# Patient Record
Sex: FEMALE | Race: WHITE | Employment: OTHER | URBAN - METROPOLITAN AREA
[De-identification: names, ages, dates, MRNs, and addresses within clinical notes are randomized per-mention and may not be internally consistent; named-entity substitution may affect disease eponyms.]

---

## 2019-04-27 ENCOUNTER — APPOINTMENT (OUTPATIENT)
Dept: GENERAL RADIOLOGY | Facility: CLINIC | Age: 84
DRG: 480 | End: 2019-04-27
Attending: EMERGENCY MEDICINE
Payer: MEDICARE

## 2019-04-27 ENCOUNTER — HOSPITAL ENCOUNTER (INPATIENT)
Facility: CLINIC | Age: 84
LOS: 5 days | Discharge: NURSING, REHAB OR LONG-TERM CARE | DRG: 480 | End: 2019-05-02
Attending: EMERGENCY MEDICINE | Admitting: SURGERY
Payer: MEDICARE

## 2019-04-27 ENCOUNTER — ANESTHESIA EVENT (OUTPATIENT)
Dept: SURGERY | Facility: CLINIC | Age: 84
DRG: 480 | End: 2019-04-27
Payer: MEDICARE
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
## 2019-04-27 DIAGNOSIS — S72.142A CLOSED INTERTROCHANTERIC FRACTURE OF HIP, LEFT, INITIAL ENCOUNTER (H): ICD-10-CM

## 2019-04-27 DIAGNOSIS — N30.00 ACUTE CYSTITIS WITHOUT HEMATURIA: Primary | ICD-10-CM

## 2019-04-27 LAB
ANION GAP SERPL CALCULATED.3IONS-SCNC: 10 MMOL/L (ref 3–14)
APTT PPP: 30 SEC (ref 22–37)
BASOPHILS # BLD AUTO: 0.1 10E9/L (ref 0–0.2)
BASOPHILS NFR BLD AUTO: 0.7 %
BUN SERPL-MCNC: 15 MG/DL (ref 7–30)
CALCIUM SERPL-MCNC: 8.6 MG/DL (ref 8.5–10.1)
CHLORIDE SERPL-SCNC: 104 MMOL/L (ref 94–109)
CO2 SERPL-SCNC: 23 MMOL/L (ref 20–32)
CREAT SERPL-MCNC: 0.73 MG/DL (ref 0.52–1.04)
DIFFERENTIAL METHOD BLD: ABNORMAL
EOSINOPHIL # BLD AUTO: 0 10E9/L (ref 0–0.7)
EOSINOPHIL NFR BLD AUTO: 0.3 %
ERYTHROCYTE [DISTWIDTH] IN BLOOD BY AUTOMATED COUNT: 13 % (ref 10–15)
GFR SERPL CREATININE-BSD FRML MDRD: 72 ML/MIN/{1.73_M2}
GLUCOSE SERPL-MCNC: 147 MG/DL (ref 70–99)
HCT VFR BLD AUTO: 36.8 % (ref 35–47)
HGB BLD-MCNC: 12 G/DL (ref 11.7–15.7)
IMM GRANULOCYTES # BLD: 0.1 10E9/L (ref 0–0.4)
IMM GRANULOCYTES NFR BLD: 0.5 %
INR PPP: 1.11 (ref 0.86–1.14)
LYMPHOCYTES # BLD AUTO: 0.5 10E9/L (ref 0.8–5.3)
LYMPHOCYTES NFR BLD AUTO: 4.2 %
MCH RBC QN AUTO: 29.7 PG (ref 26.5–33)
MCHC RBC AUTO-ENTMCNC: 32.6 G/DL (ref 31.5–36.5)
MCV RBC AUTO: 91 FL (ref 78–100)
MONOCYTES # BLD AUTO: 0.5 10E9/L (ref 0–1.3)
MONOCYTES NFR BLD AUTO: 4.2 %
NEUTROPHILS # BLD AUTO: 10 10E9/L (ref 1.6–8.3)
NEUTROPHILS NFR BLD AUTO: 90.1 %
NRBC # BLD AUTO: 0 10*3/UL
NRBC BLD AUTO-RTO: 0 /100
PLATELET # BLD AUTO: 161 10E9/L (ref 150–450)
POTASSIUM SERPL-SCNC: 3.4 MMOL/L (ref 3.4–5.3)
RADIOLOGIST FLAGS: ABNORMAL
RBC # BLD AUTO: 4.04 10E12/L (ref 3.8–5.2)
SODIUM SERPL-SCNC: 137 MMOL/L (ref 133–144)
WBC # BLD AUTO: 11.1 10E9/L (ref 4–11)

## 2019-04-27 PROCEDURE — 73502 X-RAY EXAM HIP UNI 2-3 VIEWS: CPT

## 2019-04-27 PROCEDURE — 99285 EMERGENCY DEPT VISIT HI MDM: CPT | Mod: Z6 | Performed by: EMERGENCY MEDICINE

## 2019-04-27 PROCEDURE — 12000001 ZZH R&B MED SURG/OB UMMC

## 2019-04-27 PROCEDURE — 86901 BLOOD TYPING SEROLOGIC RH(D): CPT | Performed by: EMERGENCY MEDICINE

## 2019-04-27 PROCEDURE — 80048 BASIC METABOLIC PNL TOTAL CA: CPT | Performed by: EMERGENCY MEDICINE

## 2019-04-27 PROCEDURE — 85730 THROMBOPLASTIN TIME PARTIAL: CPT | Performed by: EMERGENCY MEDICINE

## 2019-04-27 PROCEDURE — 25000128 H RX IP 250 OP 636: Performed by: EMERGENCY MEDICINE

## 2019-04-27 PROCEDURE — 99285 EMERGENCY DEPT VISIT HI MDM: CPT | Mod: 25 | Performed by: EMERGENCY MEDICINE

## 2019-04-27 PROCEDURE — 96376 TX/PRO/DX INJ SAME DRUG ADON: CPT | Performed by: EMERGENCY MEDICINE

## 2019-04-27 PROCEDURE — 86900 BLOOD TYPING SEROLOGIC ABO: CPT | Performed by: EMERGENCY MEDICINE

## 2019-04-27 PROCEDURE — 96374 THER/PROPH/DIAG INJ IV PUSH: CPT | Performed by: EMERGENCY MEDICINE

## 2019-04-27 PROCEDURE — 73562 X-RAY EXAM OF KNEE 3: CPT | Mod: LT

## 2019-04-27 PROCEDURE — 85610 PROTHROMBIN TIME: CPT | Performed by: EMERGENCY MEDICINE

## 2019-04-27 PROCEDURE — 85025 COMPLETE CBC W/AUTO DIFF WBC: CPT | Performed by: EMERGENCY MEDICINE

## 2019-04-27 PROCEDURE — 86850 RBC ANTIBODY SCREEN: CPT | Performed by: EMERGENCY MEDICINE

## 2019-04-27 PROCEDURE — 86923 COMPATIBILITY TEST ELECTRIC: CPT | Performed by: EMERGENCY MEDICINE

## 2019-04-27 RX ORDER — HYDROMORPHONE HYDROCHLORIDE 1 MG/ML
0.5 INJECTION, SOLUTION INTRAMUSCULAR; INTRAVENOUS; SUBCUTANEOUS
Status: COMPLETED | OUTPATIENT
Start: 2019-04-27 | End: 2019-04-27

## 2019-04-27 RX ADMIN — Medication 0.5 MG: at 23:56

## 2019-04-27 RX ADMIN — Medication 0.5 MG: at 20:57

## 2019-04-27 RX ADMIN — Medication 0.5 MG: at 22:56

## 2019-04-27 SDOH — HEALTH STABILITY: MENTAL HEALTH: HOW OFTEN DO YOU HAVE A DRINK CONTAINING ALCOHOL?: NEVER

## 2019-04-27 ASSESSMENT — ENCOUNTER SYMPTOMS
SHORTNESS OF BREATH: 0
NECK STIFFNESS: 0
EYE REDNESS: 0
DYSRHYTHMIAS: 1
DIFFICULTY URINATING: 0
ARTHRALGIAS: 0
ABDOMINAL PAIN: 0
COLOR CHANGE: 0
FEVER: 0
CONFUSION: 0
HEADACHES: 0

## 2019-04-27 ASSESSMENT — MIFFLIN-ST. JEOR: SCORE: 872.21

## 2019-04-27 NOTE — LETTER
Transition Communication Hand-off for Care Transitions to Next Level of Care Provider    Name: Bernadine Farrell  : 1928  MRN #: [id number]  Primary Care Provider: Dewayne Goyal     Primary Clinic: [facility] [address] [address]     Reason for Hospitalization:  Closed intertrochanteric fracture of hip, left, initial encounter (H) [S72.142A]  History of femur fracture [Z87.81]  Admit Date/Time: 2019  7:07 PM  Discharge Date: 19  Payor Source: Payor: MEDICARE / Plan: MEDICARE / Product Type: Medicare /     Readmission Assessment Measure (MAGDA) Risk Score/category: Elevated         Reason for Communication Hand-off Referral: Other D/c to TCU    Discharge Plan:  Patient Name:  Bernadine Farrell     Anticipated Discharge Date:  19    Discharge Disposition:   TCU:   TCU   [address] 4th floor  [place], [place]  [phone]      Concern for non-adherence with plan of care:   Y/N Unknown  Discharge Needs Assessment:  Needs      Most Recent Value   Equipment Currently Used at Home  cane, straight          Already enrolled in Tele-monitoring program and name of program:  Unknown  Follow-up specialty is recommended: Unknown    Follow-up plan:    Future Appointments   Date Time Provider Department Center   5/3/2019  9:00 AM Ria Matias, PT [facility]   5/15/2019 10:00 AM Nurse, [facility]       Any outstanding tests or procedures:        Referrals     Future Labs/Procedures    Occupational Therapy Adult Consult     Comments:    Evaluate and treat as clinically indicated.    Reason:  Fall, left intertrochanteric femur fracture    Physical Therapy Adult Consult     Comments:    Evaluate and treat as clinically indicated.    Reason:  Fall, left intertrochanteric femur fracture  S/P intermedullary nailing            HAIM Rankin, LICSW  7B   [phone] (pager) [phone]  2019

## 2019-04-28 ENCOUNTER — ANESTHESIA (OUTPATIENT)
Dept: SURGERY | Facility: CLINIC | Age: 84
DRG: 480 | End: 2019-04-28
Payer: MEDICARE

## 2019-04-28 ENCOUNTER — APPOINTMENT (OUTPATIENT)
Dept: CT IMAGING | Facility: CLINIC | Age: 84
DRG: 480 | End: 2019-04-28
Payer: MEDICARE

## 2019-04-28 ENCOUNTER — APPOINTMENT (OUTPATIENT)
Dept: GENERAL RADIOLOGY | Facility: CLINIC | Age: 84
DRG: 480 | End: 2019-04-28
Attending: SURGERY
Payer: MEDICARE

## 2019-04-28 ENCOUNTER — APPOINTMENT (OUTPATIENT)
Dept: GENERAL RADIOLOGY | Facility: CLINIC | Age: 84
DRG: 480 | End: 2019-04-28
Payer: MEDICARE

## 2019-04-28 PROBLEM — S72.009A HIP FRACTURE (H): Status: ACTIVE | Noted: 2019-04-28

## 2019-04-28 LAB
ALBUMIN UR-MCNC: 10 MG/DL
APPEARANCE UR: CLEAR
BILIRUB UR QL STRIP: NEGATIVE
COLOR UR AUTO: YELLOW
CREAT SERPL-MCNC: 0.72 MG/DL (ref 0.52–1.04)
GFR SERPL CREATININE-BSD FRML MDRD: 73 ML/MIN/{1.73_M2}
GLUCOSE UR STRIP-MCNC: NEGATIVE MG/DL
HGB UR QL STRIP: ABNORMAL
KETONES UR STRIP-MCNC: NEGATIVE MG/DL
LEUKOCYTE ESTERASE UR QL STRIP: ABNORMAL
NITRATE UR QL: POSITIVE
PH UR STRIP: 7.5 PH (ref 5–7)
PLATELET # BLD AUTO: 117 10E9/L (ref 150–450)
RBC #/AREA URNS AUTO: 9 /HPF (ref 0–2)
SOURCE: ABNORMAL
SP GR UR STRIP: 1.01 (ref 1–1.03)
SQUAMOUS #/AREA URNS AUTO: <1 /HPF (ref 0–1)
TRANS CELLS #/AREA URNS HPF: 1 /HPF (ref 0–1)
UROBILINOGEN UR STRIP-MCNC: NORMAL MG/DL (ref 0–2)
WBC #/AREA URNS AUTO: 69 /HPF (ref 0–5)

## 2019-04-28 PROCEDURE — 25000128 H RX IP 250 OP 636: Performed by: NURSE ANESTHETIST, CERTIFIED REGISTERED

## 2019-04-28 PROCEDURE — 0QS706Z REPOSITION LEFT UPPER FEMUR WITH INTRAMEDULLARY INTERNAL FIXATION DEVICE, OPEN APPROACH: ICD-10-PCS | Performed by: ORTHOPAEDIC SURGERY

## 2019-04-28 PROCEDURE — 85049 AUTOMATED PLATELET COUNT: CPT | Performed by: STUDENT IN AN ORGANIZED HEALTH CARE EDUCATION/TRAINING PROGRAM

## 2019-04-28 PROCEDURE — 25000128 H RX IP 250 OP 636: Performed by: STUDENT IN AN ORGANIZED HEALTH CARE EDUCATION/TRAINING PROGRAM

## 2019-04-28 PROCEDURE — 25000128 H RX IP 250 OP 636: Performed by: ANESTHESIOLOGY

## 2019-04-28 PROCEDURE — A9270 NON-COVERED ITEM OR SERVICE: HCPCS | Performed by: SURGERY

## 2019-04-28 PROCEDURE — 25800030 ZZH RX IP 258 OP 636: Performed by: SURGERY

## 2019-04-28 PROCEDURE — 40000170 ZZH STATISTIC PRE-PROCEDURE ASSESSMENT II: Performed by: ORTHOPAEDIC SURGERY

## 2019-04-28 PROCEDURE — 40000277 XR SURGERY CARM FLUORO LESS THAN 5 MIN W STILLS: Mod: TC

## 2019-04-28 PROCEDURE — 25000565 ZZH ISOFLURANE, EA 15 MIN: Performed by: ORTHOPAEDIC SURGERY

## 2019-04-28 PROCEDURE — 40000986 XR FEMUR PORT LEFT 2 VW

## 2019-04-28 PROCEDURE — 25000128 H RX IP 250 OP 636: Performed by: SURGERY

## 2019-04-28 PROCEDURE — 73552 X-RAY EXAM OF FEMUR 2/>: CPT | Mod: LT

## 2019-04-28 PROCEDURE — 25800030 ZZH RX IP 258 OP 636: Performed by: NURSE ANESTHETIST, CERTIFIED REGISTERED

## 2019-04-28 PROCEDURE — C1713 ANCHOR/SCREW BN/BN,TIS/BN: HCPCS | Performed by: ORTHOPAEDIC SURGERY

## 2019-04-28 PROCEDURE — 25000125 ZZHC RX 250: Performed by: NURSE ANESTHETIST, CERTIFIED REGISTERED

## 2019-04-28 PROCEDURE — 71000015 ZZH RECOVERY PHASE 1 LEVEL 2 EA ADDTL HR: Performed by: ORTHOPAEDIC SURGERY

## 2019-04-28 PROCEDURE — 71000014 ZZH RECOVERY PHASE 1 LEVEL 2 FIRST HR: Performed by: ORTHOPAEDIC SURGERY

## 2019-04-28 PROCEDURE — 93010 ELECTROCARDIOGRAM REPORT: CPT | Performed by: INTERNAL MEDICINE

## 2019-04-28 PROCEDURE — 36415 COLL VENOUS BLD VENIPUNCTURE: CPT | Performed by: STUDENT IN AN ORGANIZED HEALTH CARE EDUCATION/TRAINING PROGRAM

## 2019-04-28 PROCEDURE — 25000132 ZZH RX MED GY IP 250 OP 250 PS 637: Performed by: SURGERY

## 2019-04-28 PROCEDURE — 73700 CT LOWER EXTREMITY W/O DYE: CPT | Mod: LT

## 2019-04-28 PROCEDURE — 87088 URINE BACTERIA CULTURE: CPT | Performed by: NURSE PRACTITIONER

## 2019-04-28 PROCEDURE — 87086 URINE CULTURE/COLONY COUNT: CPT | Performed by: NURSE PRACTITIONER

## 2019-04-28 PROCEDURE — 36000064 ZZH SURGERY LEVEL 4 EA 15 ADDTL MIN - UMMC: Performed by: ORTHOPAEDIC SURGERY

## 2019-04-28 PROCEDURE — P9041 ALBUMIN (HUMAN),5%, 50ML: HCPCS | Performed by: NURSE ANESTHETIST, CERTIFIED REGISTERED

## 2019-04-28 PROCEDURE — 25000132 ZZH RX MED GY IP 250 OP 250 PS 637: Performed by: NURSE PRACTITIONER

## 2019-04-28 PROCEDURE — 12000001 ZZH R&B MED SURG/OB UMMC

## 2019-04-28 PROCEDURE — 99232 SBSQ HOSP IP/OBS MODERATE 35: CPT | Performed by: NURSE PRACTITIONER

## 2019-04-28 PROCEDURE — 82565 ASSAY OF CREATININE: CPT | Performed by: STUDENT IN AN ORGANIZED HEALTH CARE EDUCATION/TRAINING PROGRAM

## 2019-04-28 PROCEDURE — 37000009 ZZH ANESTHESIA TECHNICAL FEE, EACH ADDTL 15 MIN: Performed by: ORTHOPAEDIC SURGERY

## 2019-04-28 PROCEDURE — 36000066 ZZH SURGERY LEVEL 4 W FLUORO 1ST 30 MIN - UMMC: Performed by: ORTHOPAEDIC SURGERY

## 2019-04-28 PROCEDURE — 87186 SC STD MICRODIL/AGAR DIL: CPT | Performed by: NURSE PRACTITIONER

## 2019-04-28 PROCEDURE — 37000008 ZZH ANESTHESIA TECHNICAL FEE, 1ST 30 MIN: Performed by: ORTHOPAEDIC SURGERY

## 2019-04-28 PROCEDURE — 81001 URINALYSIS AUTO W/SCOPE: CPT | Performed by: NURSE PRACTITIONER

## 2019-04-28 PROCEDURE — A9270 NON-COVERED ITEM OR SERVICE: HCPCS | Performed by: NURSE PRACTITIONER

## 2019-04-28 PROCEDURE — 27210794 ZZH OR GENERAL SUPPLY STERILE: Performed by: ORTHOPAEDIC SURGERY

## 2019-04-28 PROCEDURE — 25000566 ZZH SEVOFLURANE, EA 15 MIN: Performed by: ORTHOPAEDIC SURGERY

## 2019-04-28 DEVICE — IMP SCR STRK LOCK 5.0X40MM FT 1896-5040S: Type: IMPLANTABLE DEVICE | Site: FEMUR | Status: FUNCTIONAL

## 2019-04-28 DEVICE — IMP SCR BONE STRK G3 LAG 10.5X100MM TI 3060-0100S: Type: IMPLANTABLE DEVICE | Site: FEMUR | Status: FUNCTIONAL

## 2019-04-28 DEVICE — IMPLANTABLE DEVICE: Type: IMPLANTABLE DEVICE | Site: FEMUR | Status: FUNCTIONAL

## 2019-04-28 RX ORDER — EPHEDRINE SULFATE 50 MG/ML
INJECTION, SOLUTION INTRAMUSCULAR; INTRAVENOUS; SUBCUTANEOUS PRN
Status: DISCONTINUED | OUTPATIENT
Start: 2019-04-28 | End: 2019-04-28

## 2019-04-28 RX ORDER — HYDROMORPHONE HYDROCHLORIDE 1 MG/ML
.3-.5 INJECTION, SOLUTION INTRAMUSCULAR; INTRAVENOUS; SUBCUTANEOUS EVERY 10 MIN PRN
Status: DISCONTINUED | OUTPATIENT
Start: 2019-04-28 | End: 2019-04-28 | Stop reason: HOSPADM

## 2019-04-28 RX ORDER — ALBUMIN HUMAN 5 %
INTRAVENOUS SOLUTION INTRAVENOUS PRN
Status: DISCONTINUED | OUTPATIENT
Start: 2019-04-28 | End: 2019-04-28

## 2019-04-28 RX ORDER — LIDOCAINE HYDROCHLORIDE 20 MG/ML
INJECTION, SOLUTION INFILTRATION; PERINEURAL PRN
Status: DISCONTINUED | OUTPATIENT
Start: 2019-04-28 | End: 2019-04-28

## 2019-04-28 RX ORDER — HYDROMORPHONE HCL/0.9% NACL/PF 0.2MG/0.2
0.2 SYRINGE (ML) INTRAVENOUS
Status: DISCONTINUED | OUTPATIENT
Start: 2019-04-28 | End: 2019-04-29

## 2019-04-28 RX ORDER — ONDANSETRON 4 MG/1
4 TABLET, ORALLY DISINTEGRATING ORAL EVERY 30 MIN PRN
Status: DISCONTINUED | OUTPATIENT
Start: 2019-04-28 | End: 2019-04-28 | Stop reason: HOSPADM

## 2019-04-28 RX ORDER — AMOXICILLIN 250 MG
1-2 CAPSULE ORAL 2 TIMES DAILY
Status: DISCONTINUED | OUTPATIENT
Start: 2019-04-28 | End: 2019-05-02 | Stop reason: HOSPADM

## 2019-04-28 RX ORDER — NALOXONE HYDROCHLORIDE 0.4 MG/ML
.1-.4 INJECTION, SOLUTION INTRAMUSCULAR; INTRAVENOUS; SUBCUTANEOUS
Status: DISCONTINUED | OUTPATIENT
Start: 2019-04-28 | End: 2019-05-02 | Stop reason: HOSPADM

## 2019-04-28 RX ORDER — CEFAZOLIN SODIUM 1 G/3ML
1 INJECTION, POWDER, FOR SOLUTION INTRAMUSCULAR; INTRAVENOUS SEE ADMIN INSTRUCTIONS
Status: DISCONTINUED | OUTPATIENT
Start: 2019-04-28 | End: 2019-04-28 | Stop reason: HOSPADM

## 2019-04-28 RX ORDER — ATORVASTATIN CALCIUM 10 MG/1
10 TABLET, FILM COATED ORAL DAILY
COMMUNITY
Start: 2018-11-13

## 2019-04-28 RX ORDER — FENTANYL CITRATE 50 UG/ML
INJECTION, SOLUTION INTRAMUSCULAR; INTRAVENOUS PRN
Status: DISCONTINUED | OUTPATIENT
Start: 2019-04-28 | End: 2019-04-28

## 2019-04-28 RX ORDER — NALOXONE HYDROCHLORIDE 0.4 MG/ML
.1-.4 INJECTION, SOLUTION INTRAMUSCULAR; INTRAVENOUS; SUBCUTANEOUS
Status: DISCONTINUED | OUTPATIENT
Start: 2019-04-28 | End: 2019-04-28

## 2019-04-28 RX ORDER — OXYCODONE HYDROCHLORIDE 5 MG/1
5 TABLET ORAL
Status: DISCONTINUED | OUTPATIENT
Start: 2019-04-28 | End: 2019-04-28

## 2019-04-28 RX ORDER — SODIUM CHLORIDE, SODIUM LACTATE, POTASSIUM CHLORIDE, CALCIUM CHLORIDE 600; 310; 30; 20 MG/100ML; MG/100ML; MG/100ML; MG/100ML
INJECTION, SOLUTION INTRAVENOUS CONTINUOUS
Status: DISCONTINUED | OUTPATIENT
Start: 2019-04-28 | End: 2019-04-28 | Stop reason: HOSPADM

## 2019-04-28 RX ORDER — SODIUM CHLORIDE, SODIUM LACTATE, POTASSIUM CHLORIDE, CALCIUM CHLORIDE 600; 310; 30; 20 MG/100ML; MG/100ML; MG/100ML; MG/100ML
INJECTION, SOLUTION INTRAVENOUS CONTINUOUS PRN
Status: DISCONTINUED | OUTPATIENT
Start: 2019-04-28 | End: 2019-04-28

## 2019-04-28 RX ORDER — HYDRALAZINE HYDROCHLORIDE 20 MG/ML
2.5-5 INJECTION INTRAMUSCULAR; INTRAVENOUS EVERY 10 MIN PRN
Status: DISCONTINUED | OUTPATIENT
Start: 2019-04-28 | End: 2019-04-28 | Stop reason: HOSPADM

## 2019-04-28 RX ORDER — CEFAZOLIN SODIUM 1 G/3ML
1 INJECTION, POWDER, FOR SOLUTION INTRAMUSCULAR; INTRAVENOUS EVERY 8 HOURS
Status: COMPLETED | OUTPATIENT
Start: 2019-04-28 | End: 2019-04-29

## 2019-04-28 RX ORDER — ETOMIDATE 2 MG/ML
INJECTION INTRAVENOUS PRN
Status: DISCONTINUED | OUTPATIENT
Start: 2019-04-28 | End: 2019-04-28

## 2019-04-28 RX ORDER — METOPROLOL TARTRATE 25 MG/1
25 TABLET, FILM COATED ORAL 2 TIMES DAILY
Status: DISCONTINUED | OUTPATIENT
Start: 2019-04-28 | End: 2019-04-29

## 2019-04-28 RX ORDER — LIDOCAINE 4 G/G
1 PATCH TOPICAL
Status: DISCONTINUED | OUTPATIENT
Start: 2019-04-28 | End: 2019-05-02 | Stop reason: HOSPADM

## 2019-04-28 RX ORDER — ENALAPRIL MALEATE 2.5 MG/1
2.5 TABLET ORAL DAILY
Status: DISCONTINUED | OUTPATIENT
Start: 2019-04-28 | End: 2019-04-28

## 2019-04-28 RX ORDER — LIDOCAINE 40 MG/G
CREAM TOPICAL
Status: DISCONTINUED | OUTPATIENT
Start: 2019-04-28 | End: 2019-05-02 | Stop reason: HOSPADM

## 2019-04-28 RX ORDER — ONDANSETRON 2 MG/ML
4 INJECTION INTRAMUSCULAR; INTRAVENOUS EVERY 30 MIN PRN
Status: DISCONTINUED | OUTPATIENT
Start: 2019-04-28 | End: 2019-04-28 | Stop reason: HOSPADM

## 2019-04-28 RX ORDER — PROPOFOL 10 MG/ML
INJECTION, EMULSION INTRAVENOUS PRN
Status: DISCONTINUED | OUTPATIENT
Start: 2019-04-28 | End: 2019-04-28

## 2019-04-28 RX ORDER — LEVOTHYROXINE SODIUM 50 UG/1
50 TABLET ORAL DAILY
Status: DISCONTINUED | OUTPATIENT
Start: 2019-04-28 | End: 2019-05-02 | Stop reason: HOSPADM

## 2019-04-28 RX ORDER — METOPROLOL TARTRATE 50 MG
25 TABLET ORAL 2 TIMES DAILY
COMMUNITY
Start: 2018-09-26

## 2019-04-28 RX ORDER — ISOSORBIDE MONONITRATE 60 MG/1
60 TABLET, EXTENDED RELEASE ORAL DAILY
Status: ON HOLD | COMMUNITY
Start: 2018-11-13 | End: 2019-05-01

## 2019-04-28 RX ORDER — SODIUM CHLORIDE, SODIUM LACTATE, POTASSIUM CHLORIDE, CALCIUM CHLORIDE 600; 310; 30; 20 MG/100ML; MG/100ML; MG/100ML; MG/100ML
1000 INJECTION, SOLUTION INTRAVENOUS CONTINUOUS
Status: DISCONTINUED | OUTPATIENT
Start: 2019-04-28 | End: 2019-04-29

## 2019-04-28 RX ORDER — AMLODIPINE BESYLATE 2.5 MG/1
2.5 TABLET ORAL DAILY
Status: DISCONTINUED | OUTPATIENT
Start: 2019-04-28 | End: 2019-04-29

## 2019-04-28 RX ORDER — NALOXONE HYDROCHLORIDE 0.4 MG/ML
.1-.4 INJECTION, SOLUTION INTRAMUSCULAR; INTRAVENOUS; SUBCUTANEOUS
Status: DISCONTINUED | OUTPATIENT
Start: 2019-04-28 | End: 2019-04-28 | Stop reason: HOSPADM

## 2019-04-28 RX ORDER — ACETAMINOPHEN 325 MG/1
650 TABLET ORAL EVERY 4 HOURS PRN
Status: DISCONTINUED | OUTPATIENT
Start: 2019-04-28 | End: 2019-04-29

## 2019-04-28 RX ORDER — ISOSORBIDE MONONITRATE 30 MG/1
60 TABLET, EXTENDED RELEASE ORAL DAILY
Status: DISCONTINUED | OUTPATIENT
Start: 2019-04-28 | End: 2019-04-29

## 2019-04-28 RX ORDER — FENTANYL CITRATE 50 UG/ML
25-50 INJECTION, SOLUTION INTRAMUSCULAR; INTRAVENOUS EVERY 5 MIN PRN
Status: DISCONTINUED | OUTPATIENT
Start: 2019-04-28 | End: 2019-04-28 | Stop reason: HOSPADM

## 2019-04-28 RX ORDER — ONDANSETRON 2 MG/ML
4 INJECTION INTRAMUSCULAR; INTRAVENOUS EVERY 6 HOURS PRN
Status: DISCONTINUED | OUTPATIENT
Start: 2019-04-28 | End: 2019-05-02 | Stop reason: HOSPADM

## 2019-04-28 RX ORDER — ENALAPRIL MALEATE 2.5 MG/1
2.5 TABLET ORAL DAILY
Status: ON HOLD | COMMUNITY
Start: 2018-11-13 | End: 2019-05-01

## 2019-04-28 RX ORDER — LEVOTHYROXINE SODIUM 50 UG/1
50 TABLET ORAL DAILY
COMMUNITY
Start: 2018-11-21

## 2019-04-28 RX ORDER — POLYETHYLENE GLYCOL 3350 17 G/17G
17 POWDER, FOR SOLUTION ORAL DAILY PRN
Status: DISCONTINUED | OUTPATIENT
Start: 2019-04-28 | End: 2019-05-02 | Stop reason: HOSPADM

## 2019-04-28 RX ORDER — ONDANSETRON 2 MG/ML
INJECTION INTRAMUSCULAR; INTRAVENOUS PRN
Status: DISCONTINUED | OUTPATIENT
Start: 2019-04-28 | End: 2019-04-28

## 2019-04-28 RX ORDER — MEPERIDINE HYDROCHLORIDE 25 MG/ML
12.5 INJECTION INTRAMUSCULAR; INTRAVENOUS; SUBCUTANEOUS
Status: DISCONTINUED | OUTPATIENT
Start: 2019-04-28 | End: 2019-04-28 | Stop reason: HOSPADM

## 2019-04-28 RX ORDER — ATORVASTATIN CALCIUM 10 MG/1
10 TABLET, FILM COATED ORAL EVERY EVENING
Status: DISCONTINUED | OUTPATIENT
Start: 2019-04-28 | End: 2019-05-02 | Stop reason: HOSPADM

## 2019-04-28 RX ORDER — AMLODIPINE BESYLATE 2.5 MG/1
2.5 TABLET ORAL DAILY
Status: DISCONTINUED | OUTPATIENT
Start: 2019-04-28 | End: 2019-04-28

## 2019-04-28 RX ORDER — ENOXAPARIN SODIUM 100 MG/ML
0.5 INJECTION SUBCUTANEOUS EVERY 24 HOURS
Status: DISCONTINUED | OUTPATIENT
Start: 2019-04-29 | End: 2019-04-28

## 2019-04-28 RX ORDER — KETAMINE HYDROCHLORIDE 10 MG/ML
INJECTION, SOLUTION INTRAMUSCULAR; INTRAVENOUS PRN
Status: DISCONTINUED | OUTPATIENT
Start: 2019-04-28 | End: 2019-04-28

## 2019-04-28 RX ORDER — LABETALOL 20 MG/4 ML (5 MG/ML) INTRAVENOUS SYRINGE
10
Status: COMPLETED | OUTPATIENT
Start: 2019-04-28 | End: 2019-04-28

## 2019-04-28 RX ORDER — ONDANSETRON 4 MG/1
4 TABLET, ORALLY DISINTEGRATING ORAL EVERY 6 HOURS PRN
Status: DISCONTINUED | OUTPATIENT
Start: 2019-04-28 | End: 2019-05-02 | Stop reason: HOSPADM

## 2019-04-28 RX ORDER — CEFAZOLIN SODIUM 2 G/100ML
2 INJECTION, SOLUTION INTRAVENOUS
Status: COMPLETED | OUTPATIENT
Start: 2019-04-28 | End: 2019-04-28

## 2019-04-28 RX ORDER — AMLODIPINE BESYLATE 2.5 MG/1
2.5 TABLET ORAL DAILY
Status: ON HOLD | COMMUNITY
Start: 2019-04-14 | End: 2019-05-18

## 2019-04-28 RX ORDER — ASPIRIN 81 MG/1
162 TABLET, CHEWABLE ORAL DAILY
COMMUNITY

## 2019-04-28 RX ORDER — CLOPIDOGREL BISULFATE 75 MG/1
75 TABLET ORAL DAILY
COMMUNITY
Start: 2018-11-13

## 2019-04-28 RX ADMIN — PHENYLEPHRINE HYDROCHLORIDE 100 MCG: 10 INJECTION, SOLUTION INTRAMUSCULAR; INTRAVENOUS; SUBCUTANEOUS at 10:41

## 2019-04-28 RX ADMIN — LIDOCAINE 1 PATCH: 560 PATCH PERCUTANEOUS; TOPICAL; TRANSDERMAL at 00:57

## 2019-04-28 RX ADMIN — Medication 5 MG: at 11:18

## 2019-04-28 RX ADMIN — Medication 20 MG: at 09:38

## 2019-04-28 RX ADMIN — CEFAZOLIN SODIUM 2 G: 2 INJECTION, SOLUTION INTRAVENOUS at 10:06

## 2019-04-28 RX ADMIN — PHENYLEPHRINE HYDROCHLORIDE 100 MCG: 10 INJECTION, SOLUTION INTRAMUSCULAR; INTRAVENOUS; SUBCUTANEOUS at 10:28

## 2019-04-28 RX ADMIN — SODIUM CHLORIDE, POTASSIUM CHLORIDE, SODIUM LACTATE AND CALCIUM CHLORIDE: 600; 310; 30; 20 INJECTION, SOLUTION INTRAVENOUS at 09:29

## 2019-04-28 RX ADMIN — PHENYLEPHRINE HYDROCHLORIDE 200 MCG: 10 INJECTION, SOLUTION INTRAMUSCULAR; INTRAVENOUS; SUBCUTANEOUS at 10:03

## 2019-04-28 RX ADMIN — Medication 5 MG: at 10:56

## 2019-04-28 RX ADMIN — ONDANSETRON 4 MG: 2 INJECTION INTRAMUSCULAR; INTRAVENOUS at 10:39

## 2019-04-28 RX ADMIN — SUGAMMADEX 100 MG: 100 INJECTION, SOLUTION INTRAVENOUS at 11:13

## 2019-04-28 RX ADMIN — ETOMIDATE 14 MG: 2 INJECTION INTRAVENOUS at 09:38

## 2019-04-28 RX ADMIN — PHENYLEPHRINE HYDROCHLORIDE 100 MCG: 10 INJECTION, SOLUTION INTRAMUSCULAR; INTRAVENOUS; SUBCUTANEOUS at 09:48

## 2019-04-28 RX ADMIN — CEFAZOLIN 1 G: 330 INJECTION, POWDER, FOR SOLUTION INTRAMUSCULAR; INTRAVENOUS at 18:23

## 2019-04-28 RX ADMIN — ATORVASTATIN CALCIUM 10 MG: 10 TABLET, FILM COATED ORAL at 19:51

## 2019-04-28 RX ADMIN — ALBUMIN (HUMAN) 250 ML: 12.5 SOLUTION INTRAVENOUS at 09:45

## 2019-04-28 RX ADMIN — ACETAMINOPHEN 650 MG: 325 TABLET, FILM COATED ORAL at 08:46

## 2019-04-28 RX ADMIN — AMLODIPINE BESYLATE 2.5 MG: 2.5 TABLET ORAL at 08:40

## 2019-04-28 RX ADMIN — LIDOCAINE HYDROCHLORIDE 100 MG: 20 INJECTION, SOLUTION INFILTRATION; PERINEURAL at 09:38

## 2019-04-28 RX ADMIN — FENTANYL CITRATE 25 MCG: 50 INJECTION, SOLUTION INTRAMUSCULAR; INTRAVENOUS at 11:27

## 2019-04-28 RX ADMIN — LEVOTHYROXINE SODIUM 50 MCG: 50 TABLET ORAL at 08:40

## 2019-04-28 RX ADMIN — PHENYLEPHRINE HYDROCHLORIDE 100 MCG: 10 INJECTION, SOLUTION INTRAMUSCULAR; INTRAVENOUS; SUBCUTANEOUS at 10:53

## 2019-04-28 RX ADMIN — ACETAMINOPHEN 650 MG: 325 TABLET, FILM COATED ORAL at 00:56

## 2019-04-28 RX ADMIN — SENNOSIDES AND DOCUSATE SODIUM 2 TABLET: 8.6; 5 TABLET ORAL at 19:51

## 2019-04-28 RX ADMIN — METOPROLOL TARTRATE 25 MG: 25 TABLET ORAL at 08:41

## 2019-04-28 RX ADMIN — ROCURONIUM BROMIDE 40 MG: 10 INJECTION INTRAVENOUS at 09:40

## 2019-04-28 RX ADMIN — PROPOFOL 30 MG: 10 INJECTION, EMULSION INTRAVENOUS at 10:17

## 2019-04-28 RX ADMIN — SODIUM CHLORIDE, POTASSIUM CHLORIDE, SODIUM LACTATE AND CALCIUM CHLORIDE 1000 ML: 600; 310; 30; 20 INJECTION, SOLUTION INTRAVENOUS at 00:58

## 2019-04-28 RX ADMIN — PHENYLEPHRINE HYDROCHLORIDE 200 MCG: 10 INJECTION, SOLUTION INTRAMUSCULAR; INTRAVENOUS; SUBCUTANEOUS at 10:43

## 2019-04-28 RX ADMIN — PHENYLEPHRINE HYDROCHLORIDE 100 MCG: 10 INJECTION, SOLUTION INTRAMUSCULAR; INTRAVENOUS; SUBCUTANEOUS at 10:33

## 2019-04-28 RX ADMIN — METOPROLOL TARTRATE 25 MG: 25 TABLET ORAL at 19:51

## 2019-04-28 RX ADMIN — LABETALOL 20 MG/4 ML (5 MG/ML) INTRAVENOUS SYRINGE 5 MG: at 11:46

## 2019-04-28 RX ADMIN — Medication 0.2 MG: at 00:56

## 2019-04-28 RX ADMIN — ISOSORBIDE MONONITRATE 60 MG: 30 TABLET, EXTENDED RELEASE ORAL at 08:40

## 2019-04-28 RX ADMIN — PHENYLEPHRINE HYDROCHLORIDE 100 MCG: 10 INJECTION, SOLUTION INTRAMUSCULAR; INTRAVENOUS; SUBCUTANEOUS at 10:15

## 2019-04-28 RX ADMIN — Medication 0.2 MG: at 03:59

## 2019-04-28 RX ADMIN — Medication 10 MG: at 09:48

## 2019-04-28 RX ADMIN — PHENYLEPHRINE HYDROCHLORIDE 100 MCG: 10 INJECTION, SOLUTION INTRAMUSCULAR; INTRAVENOUS; SUBCUTANEOUS at 10:26

## 2019-04-28 RX ADMIN — LABETALOL 20 MG/4 ML (5 MG/ML) INTRAVENOUS SYRINGE 5 MG: at 11:55

## 2019-04-28 RX ADMIN — FENTANYL CITRATE 100 MCG: 50 INJECTION, SOLUTION INTRAMUSCULAR; INTRAVENOUS at 09:38

## 2019-04-28 RX ADMIN — PHENYLEPHRINE HYDROCHLORIDE 200 MCG: 10 INJECTION, SOLUTION INTRAMUSCULAR; INTRAVENOUS; SUBCUTANEOUS at 10:55

## 2019-04-28 ASSESSMENT — ACTIVITIES OF DAILY LIVING (ADL)
BATHING: 0-->INDEPENDENT
RETIRED_EATING: 0-->INDEPENDENT
ADLS_ACUITY_SCORE: 18
WHICH_OF_THE_ABOVE_FUNCTIONAL_RISKS_HAD_A_RECENT_ONSET_OR_CHANGE?: AMBULATION
SWALLOWING: 0-->SWALLOWS FOODS/LIQUIDS WITHOUT DIFFICULTY
NUMBER_OF_TIMES_PATIENT_HAS_FALLEN_WITHIN_LAST_SIX_MONTHS: 2
AMBULATION: 1-->ASSISTIVE EQUIPMENT
TOILETING: 0-->INDEPENDENT
ADLS_ACUITY_SCORE: 13
DRESS: 0-->INDEPENDENT
ADLS_ACUITY_SCORE: 18
TRANSFERRING: 0-->INDEPENDENT
COGNITION: 0 - NO COGNITION ISSUES REPORTED
RETIRED_COMMUNICATION: 0-->UNDERSTANDS/COMMUNICATES WITHOUT DIFFICULTY
ADLS_ACUITY_SCORE: 13
FALL_HISTORY_WITHIN_LAST_SIX_MONTHS: YES

## 2019-04-28 ASSESSMENT — LIFESTYLE VARIABLES: TOBACCO_USE: 1

## 2019-04-28 NOTE — PROGRESS NOTES
Brief post op check note  D: POD # 0 S/P left femur intermedullary nailing for traumatic femur fracture. Uneventful post op course, , transferred to floor.    I/A: Awake, alert, lying in bed in NAD. Denies pain, shortness of breath or nausea. Left distal extremity warm to touch +2 pedal pulses, surgical dressing CDI.  Vitals:    04/28/19 1215 04/28/19 1230 04/28/19 1310 04/28/19 1325   BP:   150/52 128/50   BP Location:   Left arm Left arm   Pulse:       Resp: 14 14     Temp:    95.6  F (35.3  C)   TempSrc:    Axillary   SpO2: 92% 96% 100% 100%   Weight:       Height:         P: Plan discussed with son who was at the bedside.   OK to bear weight to her left leg as tolerated, pain control, advance diet as tolerated. F/U CBC in am. Cardiology to weigh in on resumption of ASA and Plavix.    Allan Huff [facility]  Trauma Services  Pager [phone]

## 2019-04-28 NOTE — ED NOTES
[facility], [place]   ED Nurse to Floor Handoff     Bernadine Farrell is a 89 year old female who speaks English and lives with family members,  in a home  They arrived in the ED by ambulance from home    ED Chief Complaint: Fall    ED Dx;   Final diagnoses:   Closed intertrochanteric fracture of hip, left, initial encounter (H)         Needed?: No    Allergies: Allergies no known allergies.  Past Medical Hx:   Past Medical History:   Diagnosis Date     A-fib (H)      Hypertension       Baseline Mental status: WDL, very hard of hearing  Current Mental Status changes: at basesline    Infection present or suspected this encounter: no  Sepsis suspected: No  Isolation type: No active isolations     Activity level - Baseline/Home:  Independent  Activity Level - Current:   Stand with Assist of 2    Bariatric equipment needed?: No    In the ED these meds were given:   Medications   HYDROmorphone (PF) (DILAUDID) injection 0.5 mg (0.5 mg Intravenous Given 4/27/19 4435)       Drips running?  No    Home pump  No    Current LDAs  Peripheral IV 04/27/19 Right Lower forearm (Active)   Number of days: 0       Labs results:   Labs Ordered and Resulted from Time of ED Arrival Up to the Time of Departure from the ED   CBC WITH PLATELETS DIFFERENTIAL - Abnormal; Notable for the following components:       Result Value    WBC 11.1 (*)     Absolute Neutrophil 10.0 (*)     Absolute Lymphocytes 0.5 (*)     All other components within normal limits   BASIC METABOLIC PANEL - Abnormal; Notable for the following components:    Glucose 147 (*)     All other components within normal limits   INR   PARTIAL THROMBOPLASTIN TIME   ABO/RH TYPE AND SCREEN       Imaging Studies:   Recent Results (from the past 24 hour(s))   XR Knee Left 3 Views    Narrative    Exam: XR KNEE LT 3 VW, 4/27/2019 8:50 PM    Indication: Fall, knee pain    Comparison: None    Findings:   AP and lateral views of the left knee.  "Tricompartmental degenerative  changes of the knee. No acute fracture. Moderate joint effusion.  Vascular calcifications. Soft tissues are unremarkable.      Impression    Impression:   1. No acute fracture.  2. Tricompartmental degenerative changes of the knee.   3. Moderate joint effusion.    I have personally reviewed the examination and initial interpretation  and I agree with the findings.    SANDEE COTE MD   XR Pelvis and Hip Left 2 Views   Result Value    Radiologist flags Left hip fracture (Urgent)    Narrative    Intertrochanteric fracture of the left femur.    [Urgent Result: Left hip fracture]    Finding was identified on 4/27/2019 9:01 PM.     Dr. Womack was contacted by Dr. Salazar at 4/27/2019 9:02 PM and  verbalized understanding of the urgent finding.        Recent vital signs:   /56   Pulse 79   Temp 97.3  F (36.3  C) (Oral)   Resp 18   Ht 1.575 m (5' 2\")   Wt 49.9 kg (110 lb)   SpO2 95%   BMI 20.12 kg/m      Sowmya Coma Scale Score: 15 (04/27/19 1919)       Cardiac Rhythm: Normal Sinus, Hx of a-fib  Pt needs tele? No  Skin/wound Issues: None    Code Status: Full Code    Pain control: fair    Nausea control: pt had none    Abnormal labs/tests/findings requiring intervention: See Imaging    Family present during ED course? Yes   Family Comments/Social Situation comments: none    Tasks needing completion: None    Samia Gaspar RN  [phone] [facility]      "

## 2019-04-28 NOTE — H&P
[facility]    History and Physical / Consult note: Trauma Service       Date of Admission:  4/27/2019  Time of Admission/Consult Request (page/call): 2100    Time of my evaluation: 2140  Consulting services:  Orthopedics - Non-emergent consult: Called by ED    Assessment & Plan   Trauma mechanism: Ground level fall  Time/date of injury: 4/27, ~5:30 pm  Known Injuries:  1. L intertrochanteric femur fracture  Other diagnoses:   1. Acute pain      Procedure: Pending    Plan:  1. Admit to trauma  2. Plan for OR tomorrow morning   3. NPO p midnight, mIVF  4. Pain management with multimodal medications  5. Encourage IS  6. Resume home medications except for ASA/plavix  7. Hold DVT ppx until post-op (or being in the morning if surgery is delayed)  8. Ordered pre-op labs including type and screen    Code status: Full code confirmed with patient's son.     Primary Care Physician   Dewayne Goyal    Chief Complaint   L hip pain    History is obtained from the patient    History of Present Illness   Bernadine Farrell is a 89 year old female who presents with left hip pain after a fall from standing height this evening. She tripped over her foot while taking off her shoes and fell to the ground on her left side. She did not lose consciousness and may have hit her head on an adjacent wall, but she denies headache and did not feel the head was struck hard. She endorses pain in the left hip, otherwise no pain anywhere else. The patient is declining a CT scan of the head. I told her we would keep an eye on her overnight and obtain a CT scan if anything changed in her mental status.     Past Medical History    I have reviewed this patient's medical history and updated it with pertinent information if needed.   Past Medical History:   Diagnosis Date     A-fib (H)      Hypertension    TAVR in 2017 at [facility]     Past Surgical History   I have reviewed this patient's surgical history and  updated it with pertinent information if needed.  Past Surgical History:   Procedure Laterality Date     CARDIAC SURGERY      TARV 2017     Prior to Admission Medications    Enalapril  Imdur  Metoprolol  ASA  Plavix  Levothyroxine  Amlodipine  Lipitor    Allergies   Allergies no known allergies    Social History   Never smoker  Does not drink alcohol     Family History   Family history reviewed with patient and is noncontributory.    Review of Systems   CONSTITUTIONAL: No fever, chills, sweats, fatigue   EYES: no visual blurring, no double vision or visual loss  ENT: no decrease in hearing, no tinnitus, no vertigo, no hoarseness  RESPIRATORY: no shortness of breath, no cough, no sputum   CARDIOVASCULAR: no palpitations, no chest  pain, no exertional chest pain or pressure  GASTROINTESTINAL: no nausea or vomiting, or abd pain  GENITOURINARY: no dysuria, no frequency or hesitancy, no hematuria  MUSCULOSKELETAL: no weakness, no redness, no swelling, no joint pain,   SKIN: no rashes, ecchymoses, abrasions or lacerations  NEUROLOGIC: no numbness or tingling of hands, no numbness or tingling  of feet, no syncope, no tremors or weakness  PSYCHIATRIC: no sleep disturbances, no anxiety or depression    Physical Exam   Temp: 97.3  F (36.3  C) Temp src: Oral BP: 124/56 Pulse: 79 Heart Rate: 80 Resp: 18 SpO2: 95 %      Vital Signs with Ranges  Temp:  [97.3  F (36.3  C)] 97.3  F (36.3  C)  Pulse:  [78-97] 79  Heart Rate:  [80] 80  Resp:  [18] 18  BP: (124-195)/(56-86) 124/56  SpO2:  [95 %-100 %] 95 % 110 lbs 0 oz    Primary Survey:  Airway: patient talking  Breathing: symmetric respiratory effort bilaterally  Circulation: central pulses present and peripheral pulses present  Disability: Pupils - left 4 mm and brisk, right 4 mm and brisk     Sowmya Coma Scale - Total 15/15  Eye Response (E): 4  4= spontaneous,  3= to verbal/voice, 2=  to pain, 1= No response   Verbal Response (V): 5   5= Orientated, converses,  4= Confused,  converses, 3= Inappropriate words,  2= Incomprehensible sounds,  1=No response   Motor Response (M): 6   6= Obeys commands, 5= Localizes to pain, 4= Withdrawal to pain, 3=Fexion to pain, 2= Extension to pain, 1= No response    Secondary Survey:  General: alert, oriented to person, place, time  Head: atraumatic, normocephalic, trachea midline, no bruises or hematomas  Eyes: PERRLA, EOMI, corneas and conjunctivae clear  Ears: pearly grey bilateral TMs and non-inflamed external ear canals  Nose: nares patent, no drainage, nasal septum non-tender  Mouth/Throat: no exudates or erythema,  no dental tenderness or malocclusions, no tongue lacerations  Neck:  no cervical collar present. No midline posterior tenderness, full AROM without pain or tenderness   Chest/Pulmonary: normal respiratory rate and rhythm,  bilateral clear breath sounds, no wheezes, rales or rhonchi, no chest wall tenderness or deformities,   Cardiovascular: S1, S2,  normal and regular rate and rhythm, no murmurs  Abdomen: soft, non-tender, no guarding, no rebound tenderness and no tenderness to palpation  : normal external genitalia, pelvis stable to lateral compression  Back/Spine: no deformity, no midline tenderness, no sacral tenderness,  no step-offs and no abrasions or contusions  Musculoskel/Extremities: full AROM of major joints without tenderness (left hip exam deferred), edema, erythema, ecchymosis, or abrasions.  Vascular: palpable DP pulse L foot  Hand: no gross deformities of hands or fingers. Full AROM of hand and fingers in flexion and extension.  strength equal and symmetric.   Skin: no rashes, laceration, ecchymosis, skin warm and dry.   Neuro: PERRLA, alert, oriented x 4. CN II-XII grossly intact. No focal deficits. Strength 5/5 x 4 extremities.  Sensation intact.  Psychiatric: affect/mood normal, cooperative, normal judgement/insight and memory intact      Data    I personally reviewed both x-ray images and the radiologist  interpretations. There is an intertrochanteric fracture of the left hip without fracture or dislocation of the knee.     Results for orders placed or performed during the hospital encounter of 04/27/19 (from the past 24 hour(s))   XR Knee Left 3 Views    Narrative    1. No acute fracture.  2. Tricompartmental degenerative changes of the knee.   3. Moderate joint effusion.   XR Pelvis and Hip Left 2 Views   Result Value Ref Range    Radiologist flags Left hip fracture (Urgent)     Narrative    Intertrochanteric fracture of the left femur.    [Urgent Result: Left hip fracture]    Finding was identified on 4/27/2019 9:01 PM.     Dr. Womack was contacted by Dr. Salazar at 4/27/2019 9:02 PM and  verbalized understanding of the urgent finding.    CBC with platelets differential   Result Value Ref Range    WBC 11.1 (H) 4.0 - 11.0 10e9/L    RBC Count 4.04 3.8 - 5.2 10e12/L    Hemoglobin 12.0 11.7 - 15.7 g/dL    Hematocrit 36.8 35.0 - 47.0 %    MCV 91 78 - 100 fl    MCH 29.7 26.5 - 33.0 pg    MCHC 32.6 31.5 - 36.5 g/dL    RDW 13.0 10.0 - 15.0 %    Platelet Count 161 150 - 450 10e9/L    Diff Method Automated Method     % Neutrophils 90.1 %    % Lymphocytes 4.2 %    % Monocytes 4.2 %    % Eosinophils 0.3 %    % Basophils 0.7 %    % Immature Granulocytes 0.5 %    Nucleated RBCs 0 0 /100    Absolute Neutrophil 10.0 (H) 1.6 - 8.3 10e9/L    Absolute Lymphocytes 0.5 (L) 0.8 - 5.3 10e9/L    Absolute Monocytes 0.5 0.0 - 1.3 10e9/L    Absolute Eosinophils 0.0 0.0 - 0.7 10e9/L    Absolute Basophils 0.1 0.0 - 0.2 10e9/L    Abs Immature Granulocytes 0.1 0 - 0.4 10e9/L    Absolute Nucleated RBC 0.0    INR   Result Value Ref Range    INR 1.11 0.86 - 1.14   Partial thromboplastin time   Result Value Ref Range    PTT 30 22 - 37 sec   Basic metabolic panel   Result Value Ref Range    Sodium 137 133 - 144 mmol/L    Potassium 3.4 3.4 - 5.3 mmol/L    Chloride 104 94 - 109 mmol/L    Carbon Dioxide 23 20 - 32 mmol/L    Anion Gap 10 3 - 14 mmol/L     Glucose 147 (H) 70 - 99 mg/dL    Urea Nitrogen 15 7 - 30 mg/dL    Creatinine 0.73 0.52 - 1.04 mg/dL    GFR Estimate 72 >60 mL/min/[1.73_m2]    GFR Estimate If Black 83 >60 mL/min/[1.73_m2]    Calcium 8.6 8.5 - 10.1 mg/dL       Ruddy Andersen

## 2019-04-28 NOTE — ANESTHESIA CARE TRANSFER NOTE
Patient: Bernadine Farrell    Procedure(s):  OPEN REDUCTION INTERNAL FIXATION, FRACTURE, FEMUR, USING INTRAMEDULLARY JESSE    Diagnosis: Left Femur Fracture  Diagnosis Additional Information: No value filed.    Anesthesia Type:   No value filed.     Note:  Airway :Face Mask  Patient transferred to:PACU  Handoff Report: Identifed the Patient, Identified the Reponsible Provider, Reviewed the pertinent medical history, Discussed the surgical course, Reviewed Intra-OP anesthesia mangement and issues during anesthesia, Set expectations for post-procedure period and Allowed opportunity for questions and acknowledgement of understanding      Vitals: (Last set prior to Anesthesia Care Transfer)    CRNA VITALS  4/28/2019 1055 - 4/28/2019 1129      4/28/2019             Pulse:  84    Ht Rate:  83    SpO2:  100 %    Resp Rate (observed):  1  (Abnormal)                 Electronically Signed By: LV Owens CRNA  April 28, 2019  11:29 AM

## 2019-04-28 NOTE — BRIEF OP NOTE
[facility], [place]    Brief Operative Note    Pre-operative diagnosis: Left Femur Fracture  Post-operative diagnosis Same  Procedure: Procedure(s):  OPEN REDUCTION INTERNAL FIXATION, FRACTURE, FEMUR, USING INTRAMEDULLARY JESSE  Surgeon: Surgeon(s) and Role:     * Kofi Das MD - Primary     * Bassam Newton MD - Resident - Assisting  Anesthesia: General   Estimated blood loss: 100 ml  Drains: None  Specimens: * No specimens in log *  Findings:   Please see full dictated operative report.  Complications: None.  Implants:    Implant Name Type Inv. Item Serial No.  Lot No. LRB No. Used   IMP SCR BONE STRK G3 LAG [id number] TI [id number] Metallic Hardware/[place] IMP SCR BONE STRK G3 LAG [id number] TI [id number]  [employer] [id number] Left 1   IMP WIRE HAYLEE [id number] 1210-6450S Wire IMP WIRE HAYLEE [id number] 1210-6450S  [employer] [id number] Left 1   IMP SCR STRK LOCK 5.0X40MM FT [id number] Metallic Hardware/[place] IMP SCR STRK LOCK 5.0X40MM FT [id number]  [employer] [id number] Left 1   IMP WIRE HAYLEE STRK [id number] 1806-0050S Wire IMP WIRE HAYLEE STRK [id number] 1806-0050S  [employer] [id number] Left 1   Gamma3 Long Nail Kit R1.5 TI Left    [employer] [id number] Left 1       Post-Op Plan:   Trauma Primary, appreciate cares  Activity: Up with assist.  Weight bearing status: WBAT LLE  Imaging: AP and lateral Left hip in PACU. CT left knee today.  Antibiotics: Perioperative antibiotics x24 hours  Diet: Begin with clear fluids and progress diet as tolerated.   DVT prophylaxis: Lovenox 0.5 mg/kg x 6 weeks  Wound Care: Aquacel dressing change on POD #2-3 per ortho.  Pain management: transition from IV to orals as tolerated.    Physical Therapy/Occupational Therapy: Gait, transfers, ROM, ADL's.   Consults: PT, OT, appreciate assistance in caring for this patient.    Follow-up: Clinic with Dr. Das in 2 weeks for wound check and evaluation  in clinic (schedulers emailed)    Bassam Newton MD  Orthopaedic Surgery, PGY1  Pager: [phone]

## 2019-04-28 NOTE — ANESTHESIA PROCEDURE NOTES
Arterial Line Procedure Note  Staff:     Anesthesiologist:  Charley Luna MD    Resident/CRNA:  Naman Quiroz MD    Arterial line performed by resident/CRNA in presence of a teaching physician    Location: In OR After Induction  Procedure Start/Stop Times:     patient identified, IV checked, site marked, risks and benefits discussed, informed consent, monitors and equipment checked, pre-op evaluation and at physician/surgeon's request      Correct Patient: Yes      Correct Position: Yes      Correct Site: Yes      Correct Procedure: Yes      Correct Laterality:  Yes    Site Marked:  Yes  Line Placement:     Procedure:  Arterial Line    Insertion Site:  Radial    Insertion laterality:  Left    Skin Prep: Chloraprep      Patient Prep: mask, sterile gloves, hat and hand hygiene      Local skin infiltration:  None    Ultrasound Guided?: No      Catheter size:  20 gauge, Quick cath    Dressing:  Tegaderm    Complications:  None obvious    Arterial waveform: Yes      IBP within 10% of NIBP: Yes

## 2019-04-28 NOTE — PROGRESS NOTES
[facility], [place]  Tertiary Survey Progress Note     Date of Service (when I saw the patient): 04/28/2019     Assessment & Plan     Trauma Mechanism: Ground level fall  Date and time of injury: 04/27/2019  About 1730  Known Injuries:  1. Left intertrochanteric fracture                   Other diagnoses:  1. Acute traumatic pain  2. Hypertension  3. CAD stents on ASA and Plavix  4. Hx TAVR 2017- Aortic stenosis  5. Hx pAfib  6. Hypothyroidism      Procedure(s):    Plan:  1. Tertiary completed today, April 28, 2019. Back not examined, complete tertiary post op or tomorrow am  2. Femur fracture: Plan for left femur ORIF today. Distal pulses +2,   1. Bedrest,  2. NWB to LLE.   3. NPO, MIVF ordered.   4. Pre-op labs, vitals reviewed. Called and informed son.  3. Resp: No acute issues, Pulmonary toilet pre and post op  4. Neuro/ Pain: Declined head CT on admission. A x O x 3, denies headache, no visible signs of head trauma.  1. Acute pain: Left hip pain, denies other pain. PRN tylenol, hydromorphone. Ice to left hip.  2. Routine neurochecks  5. Cardiac:   1. Hypertension: BP elevated this morning, OK to give PTA betablocker, other antihypertensives, hold ACEi until post op. Monitor closely. Denies shortness of breath or chest pain.  2. Pre-op EKG pending  6. Delirium prevention measures: high risk given advanced age, recent trauma and hospitalization, narcotic medications and pain. Early mobility post op as able, awake during the day as much as able, allow for night time rest, pain control  7. FEN: Electrolytes WNL, NPO. Monitor post op  8. Palliative care consult: routine geriatric trauma given advanced age, femur fracture.  9. PT/OT consults  10. Social work- discharge planning    General Cares:    PPI/H2 blocker: N/A   DVT prophylaxis: Mechanical preop   Bowel Regimen/Date of last stool: PTA- ordered   Pulmonary toilet: IS, cough and deep breath   ETOH screen completed- denies alcohol  consumption   Lines / drains: PIV    Code status:  Full code   Expected D/C date: 2-3 days post op    Interval History   Reports left hip and groin pain, denies shortness of breath, chest pain or abdominal pain.  Review of Systems   Review Of Systems  Skin: negative  Eyes: negative  Ears/Nose/Throat:  Hard of hearing  Respiratory: No shortness of breath, dyspnea on exertion, cough, or hemoptysis  Cardiovascular: negative  Gastrointestinal: negative  Genitourinary: negative  Musculoskeletal:  joint pain and joint stiffness  Neurologic: negative  Psychiatric: negative  Hematologic/Lymphatic/Immunologic: negative  Endocrine: negative     Physical Exam     Sowmya Coma Scale - Total 15/15  Eye Response (E): 4   4= spontaneous, 3= to verbal/voice, 2= to pain, 1= No response   Verbal Response (V): 5   5= Orientated, converses, 4= Confused, converses, 3= Inappropriate words, 2= Incomprehensible sounds, 1=No response   Motor Response (M): 6   6= Obeys commands, 5= Localizes to pain, 4= Withdrawal to pain, 3=Fexion to pain, 2= Extension to pain, 1= No response   Physical Exam   Constitutional: She is oriented to person, place, and time. She appears well-developed. No distress.   HENT:   Head: Atraumatic.   Eyes: Pupils are equal, round, and reactive to light. Right eye exhibits no discharge. No scleral icterus.   Neck: Normal range of motion.   Cardiovascular: Normal rate, regular rhythm, normal heart sounds and intact distal pulses.   Pulmonary/Chest: No respiratory distress. She has no wheezes. She has no rales.   Abdominal: Soft. Bowel sounds are normal. She exhibits no distension. There is no tenderness.   Musculoskeletal: She exhibits tenderness.   Left thigh, groin tenderness   Neurological: She is alert and oriented to person, place, and time.   Very hard of hearing   Skin: Skin is warm and dry. Capillary refill takes less than 2 seconds.   Psychiatric: She has a normal mood and affect.       Temp: 96.7  F (35.9  C)  Temp src: Oral BP: 178/66 Pulse: 92 Heart Rate: 97 Resp: 16 SpO2: 99 % O2 Device: None (Room air)    Vitals:    04/27/19 1919   Weight: 49.9 kg (110 lb)     Vital Signs with Ranges  Temp:  [96.2  F (35.7  C)-97.3  F (36.3  C)] 96.7  F (35.9  C)  Pulse:  [78-97] 92  Heart Rate:  [80-97] 97  Resp:  [16-20] 16  BP: ()/(56-86) 178/66  SpO2:  [95 %-100 %] 99 %  I/O last 3 completed shifts:  In: 360 [P.O.:60; I.V.:300]  Out: 450 [Urine:450]      LV Jones CNP  To contact the trauma service use job code pager [phone],   Numeric texts or alpha text through [facility]

## 2019-04-28 NOTE — ANESTHESIA POSTPROCEDURE EVALUATION
Anesthesia POST Procedure Evaluation    Patient: Bernadine Farrell   MRN:     [id number] Gender:   female   Age:    89 year old :      1928        Preoperative Diagnosis: Left Femur Fracture   Procedure(s):  OPEN REDUCTION INTERNAL FIXATION, FRACTURE, FEMUR, USING INTRAMEDULLARY JESSE   Postop Comments: No value filed.       Anesthesia Type:  General  General    Reportable Event: NO     PAIN: Uncomplicated   Sign Out status: Comfortable, Well controlled pain     PONV: No PONV   Sign Out status:  No Nausea or Vomiting     Neuro/Psych: Uneventful perioperative course   Sign Out Status: Preoperative baseline; Age appropriate mentation     Airway/Resp.: Uneventful perioperative course   Sign Out Status: Non labored breathing, age appropriate RR; Resp. Status within EXPECTED Parameters     CV: Uneventful perioperative course   Sign Out status: Appropriate BP and perfusion indices; Appropriate HR/Rhythm     Disposition:   Sign Out in:  PACU  Disposition:  Floor  Recovery Course: Uneventful  Follow-Up: Not required           Last Anesthesia Record Vitals:  CRNA VITALS  2019 1055 - 2019 1155      2019             ART BP:  196/66    Ht Rate:  82    SpO2:  100 %    EKG:  Sinus rhythm          Last PACU Vitals:  Vitals Value Taken Time   /65 2019 11:45 AM   Temp 36.2  C (97.1  F) 2019 11:45 AM   Pulse 73 2019 11:40 AM   Resp 14 2019 12:15 PM   SpO2 92 % 2019 12:19 PM   Temp src     NIBP     Pulse     SpO2 100 % 2019 11:29 AM   Resp     Temp     Ht Rate 82 2019 11:29 AM   Temp 2     Vitals shown include unvalidated device data.      Electronically Signed By: Charley Luna MD, 2019, 12:20 PM

## 2019-04-28 NOTE — PROGRESS NOTES
SPIRITUAL HEALTH SERVICES  [facility] ([place]) PACU   PRE-SURGERY VISIT    Had pre-surgery visit with pt and her son Ricki.  Provided spiritual support, prayer.     Romana Hope  Oncology   Pager [phone]    [facility] remains available 24/7 for emergent requests/referrals, either by having the switchboard page the on-call  or by entering an ASAP/STAT consult in Epic (this will also page the on-call ).       No

## 2019-04-28 NOTE — PLAN OF CARE
IP OT 7B: Orders received and acknowledged.  Cancel and reschedule eval for tomorrow.  Pt in OR majority of day today and not appropriate.

## 2019-04-28 NOTE — CONSULTS
Orthopedic Consultation Note    Bernadine Farrell MRN# [id number]   Age: 89 year old YOB: 1928   Date of Admission: 4/27/2019    Reason for consult: Left intertoch hip fracture   Requesting physician: Frankie Womack DO   Level of consult: One-time consult to assist in determining a diagnosis and to recommend an appropriate treatment plan           Impression and Recommendation (Resident / Clinician):   Impression:  Bernadine Farrell is a 89 year old female with a left intertrochanteric hip fracture following mechanical fall.      Recomendations:  Operative Plan: IMN Left Proximal femur 4/28 morning   - NPO at midnight  - Consent completed   - Labs: CBC, BMP, INR/PTT, Type and Screen   - Medical clearance for surgery to be performed by Primary Team    Admit to Trauma Primary at [facility]  Activity: bedrest until post op  Pain control per primary team  NPO at midnight  Consent completed, in chart  Preop orders placed  Hold Plavix until post op; anticipate DVT proph with resumption of ASA/plavix post op  No further imaging at this time  PT/OT post op    Anticipate WBAT post op with PT/OT and dispo based on mobility    Alphonso Maya MD 04/27/2019  Orthopaedic Surgery Resident, PGY-4  Pager: [phone]            Chief Complaint:   Left hip/knee pain          History of Present Illness (Resident / Clinician):   Bernadine Farrell is a 89 year old female with a history of atrial fibrillation, valve replacement, and hypertension, who presents to the Emergency Department by ambulance accompanied by her son for evaluation following a witnessed mechanical fall. Patient reports that she was attempting to take her shoes off during which she reportedly slipped and landed on her left side. She does endorse slightly striking her head but denies any loss of consciousness. She is on ASA/plavix for her a fib. She ambulates independently and has a cane which she uses sparingly.       "  History obtained from patient interview and chart review.        Past Medical History:     Past Medical History:   Diagnosis Date     A-fib (H)      Hypertension              Past Surgical History:     Past Surgical History:   Procedure Laterality Date     CARDIAC SURGERY      TARV 2017             Social History:   Tobacco use: nonsmoker, former  Alcohol use: none   Elicit drug use: Patient denies  Ambulates independently and with a cane for longer distances            Family History:   No family history of anesthesia, bleeding or clotting complications.           Allergies:   Allergies no known allergies          Medications:   Medication reviewed with patient and in chart.  Anticoagulation: ASA/Plavix  Antibiotics: None          Review of Systems:   A 12 point ROS was conducted and was otherwise negative except for HPI above.          Physical Exam:     /65   Temp 97.3  F (36.3  C) (Oral)   Resp 18   Ht 1.575 m (5' 2\")   Wt 49.9 kg (110 lb)   SpO2 100%   BMI 20.12 kg/m    General: awake, alert, cooperative, no apparent distress, appears stated age  HEENT: normocephalic, atraumatic, EOMI, no scleral icterus  Respiratory: breathing non-labored, no wheezing  Cardiovascular: nontachycardic  Skin: no rashes or lesions  Neurological: A&Ox3, CN II-XII grossly intact    Musculoskeletal:    LLE: apparent shortening and ER of the LLE relative to RLE.  Fires TA/Gastroc/EHL/FHL with 5/5 strength. SILT in femoral, sural, saphenous, deep peroneal, superficial peroneal, and tibial nerve distributions. Dorsalis pedis and posterior tibial arteries 2+ and foot wwp with BCR.          Imaging:   Review of AP pelvis and lateral hip demonstrates a 2-part intertrochanteric left femur fracture with displacement and varus deformity         Laboratory date:   CBC:  No results found for: WBC, HGB, PLT    BMP:  No results found for: NA, POTASSIUM, CHLORIDE, CO2, BUN, CR, ANIONGAP, SHANNAN, GLC    Inflammatory Markers:  No results " found for: WBC, CRP, SED    Alphonso Maya MD  Orthopaedic Surgery Resident  [phone]    Attestation:  This patient was discussed with Dr Das who agrees with the above.

## 2019-04-28 NOTE — ED PROVIDER NOTES
History     Chief Complaint   Patient presents with     Fall     The history is provided by the patient and a relative.     Bernadine Farrell is a 89 year old female with a history of atrial fibrillation and hypertension, who presents to the Emergency Department by ambulance accompanied by her son for evaluation following a witnessed mechanical fall. Patient reports that she was attempting to take her shoes off during which she reportedly slipped and landed on her left side. She does endorse slightly striking her head but denies any loss of consciousness. Patients main complaint is pain of her left knee and decreased range of motion. Otherwise, she denies any pain elsewhere. Her son reports that she appears neurologically normal otherwise. He did notice some swelling around her knee. Patient reports that she is on 2 daily baby Aspirin as well as Plavix, but is not anticoagulated otherwise. Of note, patient was administered 2 mg of IV Dilaudid by paramedics while en route.     I have reviewed the Medications, Allergies, Past Medical and Surgical History, and Social History in the [facility] system.    Past Medical History:   Diagnosis Date     A-fib (H)      Hypertension        Past Surgical History:   Procedure Laterality Date     CARDIAC SURGERY      TARV 2017       History reviewed. No pertinent family history.    Social History     Tobacco Use     Smoking status: Former Smoker     Smokeless tobacco: Never Used   Substance Use Topics     Alcohol use: Never     Frequency: Never     No current facility-administered medications for this encounter.      No current outpatient medications on file.      Allergies no known allergies    Review of Systems   Constitutional: Negative for fever.   HENT: Negative for congestion.    Eyes: Negative for redness.   Respiratory: Negative for shortness of breath.    Cardiovascular: Negative for chest pain.   Gastrointestinal: Negative for abdominal pain.   Genitourinary: Negative  "for difficulty urinating.   Musculoskeletal: Negative for arthralgias and neck stiffness.   Skin: Negative for color change.   Neurological: Negative for syncope and headaches.   Psychiatric/Behavioral: Negative for confusion.   All other systems reviewed and are negative.      Physical Exam   BP: 169/65  Heart Rate: 80  Temp: 97.3  F (36.3  C)  Resp: 18  Height: 157.5 cm (5' 2\")  Weight: 49.9 kg (110 lb)  SpO2: 100 %      Physical Exam   Constitutional: She is oriented to person, place, and time. She appears well-developed and well-nourished. No distress.   HENT:   Head: Normocephalic and atraumatic.   Mouth/Throat: No oropharyngeal exudate.   Eyes: Pupils are equal, round, and reactive to light. Right eye exhibits no discharge. Left eye exhibits no discharge. No scleral icterus.   Neck: Normal range of motion. Neck supple.   Cardiovascular: Normal rate, regular rhythm, normal heart sounds and intact distal pulses. Exam reveals no gallop and no friction rub.   No murmur heard.  Pulmonary/Chest: Effort normal and breath sounds normal. No respiratory distress. She has no wheezes. She exhibits no tenderness.   Abdominal: Soft. Bowel sounds are normal. She exhibits no distension. There is no tenderness.   Musculoskeletal: Normal range of motion. She exhibits no edema, tenderness or deformity.   Left leg shortened and externally rotated. Full sensation and pulse in distal foot. Able to move toes without difficulty.   Neurological: She is alert and oriented to person, place, and time. No cranial nerve deficit.   No neuro deficits.   Skin: Skin is warm and dry. No rash noted. She is not diaphoretic. No erythema. No pallor.   Psychiatric: She has a normal mood and affect.   Nursing note and vitals reviewed.      ED Course        Procedures             Critical Care time:  none             Labs Ordered and Resulted from Time of ED Arrival Up to the Time of Departure from the ED - No data to display         Assessments & " Plan (with Medical Decision Making)   This is a 9-year-old female who presents with left leg pain after mechanical fall.  She fell on her left side while attempting to take out the shoe.  She has tenderness in her left hip as well as pain in her left knee.  X-ray shows a left intertrochanteric fracture.  I discussed the case with orthopedic surgery who recommends making patient n.p.o. at midnight and plans for possible repair in the morning.  I discussed the case with trauma who will admit to their service. I discussed possible head CT with patient as she did strike her head and is on an antiplatelet agent. Patient declines at this time, states that the impact was minor and she has no pain in her head. Discussed that this can be done if patient changes her mind or develops symptoms. They recommend obtaining BMP, CBC, coagulation studies and a type and screen for preop clearance.  Discussed all results with patient and family understand and agree with the plan.    I have reviewed the nursing notes.    I have reviewed the findings, diagnosis, plan and need for follow up with the patient.       Medication List      There are no discharge medications for this visit.         Final diagnoses:   None     Ángela MCCURDY, am serving as a trained medical scribe to document services personally performed by Frankie Womack DO, based on the provider's statements to me.   Frankie MCCURDY DO, was physically present and have reviewed and verified the accuracy of this note documented by Ángela Parnell.    4/27/2019   [facility], EMERGENCY DEPARTMENT     Frankie Womack DO  04/27/19 0776

## 2019-04-28 NOTE — PLAN OF CARE
Vital signs:  Temp: 96.2  F (35.7  C) Temp src: Oral BP: 109/66 Pulse: 92 Heart Rate: 84 Resp: 18 SpO2: 98 % O2 Device: None (Room air)       Pt arrived to 7B from ED around 0000.   Activity: NWB due to left femur fx.  Neuros: A&O x4.   Cardiac: WDL.   Respiratory: WDL on RA. Denies SOB.   GI/: Voiding adequate amounts via bedpan. +BS, passing flatus.   Diet: NPO ex meds.  Lines: Right PIV infusing LR @ 50mL/hr.   Pain/nausea: IV dilaudid given x1, Tylenol given x1. Denies nausea.  Plan: ORIF today, scheduled for 0900.

## 2019-04-28 NOTE — OP NOTE
Procedure Date: 04/28/2019      PREOPERATIVE DIAGNOSIS:  Left femur intertrochanteric fracture.      POSTOPERATIVE DIAGNOSIS:  Left femur intertrochanteric fracture.      PROCEDURE PERFORMED:  Left femur intramedullary nailing.      SURGEON:  Kofi Das MD      ASSISTANT:  Janes Newton MD, Orthopedic Resident PGY1      COMPLICATIONS:  None.      DRAINS:  None.      ESTIMATED BLOOD LOSS:  100 mL.      ANESTHESIA:  General endotracheal.      INDICATIONS:  Please refer to hospital chart for further details regarding indications for Ms. Farrell's case.      DESCRIPTION OF PROCEDURE:  On 04/28/2019, the patient was taken to surgery.  Preoperative antibiotics were administered to the patient prior to arrival to the OR.  After successful induction of general endotracheal anesthesia, she was placed supine on a fracture table.  The left lower extremity was prepped and draped in sterile fashion.  The pause for the cause was performed according to the institution's policy, which confirmed laterality of the procedure.      Through multiple manipulations, we accomplished what was felt to be anatomic reduction of the intertrochanteric fracture fragments.  Subsequent to this, we proceeded with placement of a K-wire through the canal of the proximal femur where eventually it would ream with an opening reamer.      These were followed by placement of a guidewire into the canal.  Subsequent to this, we proceeded with measuring a total of 386 cm for a nail.  Some of that, it was a borderline that she was in between measurements.      We proceeded with reaming of the canal.  This was done up to 12.5 mm.  Eventually, we proceeded with placement of a nail which would have any complications as well as placement of a lag screw into the proximal femur in a center-center position.      Finally, we proceeded with placement of a distal locking screw.  At this point, we had a concern that the anterior cortex of the femur could have  been violated by the nail or the reaming.  This was not surprising given the fragility of the patient as well as her age.  Through multiple explorations under fluoroscopic control, we believe that at the time, the nail was still contained within the canal even though she may have some erosion of the anterior cortex.  Therefore, we chose to proceed with a distal locking screw, which was done in a free hand technique with excellent purchase and then to proceed with a CT scan after surgery in order to understand better the anatomy of the distal femur.      We confirmed the fluoroscopic examination AP and lateral projections of the left femur to have reduction of the fracture fragments and excellent location of the hardware except for the most distal portion of the nail, which as mentioned above.  It was not concluded if it was violating completely the anterior cortex of the femur.  These images were sent to PACS for definitive documentation.      Copious irrigation was applied.  Wound was closed in layers.  Sterile drapes were applied.  The patient was extubated in the OR and transferred in stable condition to PACU.      PLAN:  The patient will remain weightbearing as tolerated.  The patient will be pending on her weightbearing status, however, based on the CT scan findings.      She will proceed with physical therapy without any restrictions.  She will be maintained on DVT prophylaxis for 6 weeks at 0.5 mg of Lovenox per kilogram per day subcutaneously.      At 2 weeks from surgery, sutures will be removed if indicated.  She will continue working with physical therapy between weeks 2 and 6 and at 6 weeks from surgery, and AP and lateral x-rays of the left femur will be obtained.  Based on those findings, further recommendations will be given to the patient.         JULIÁN BANEGAS MD             D: 04/28/2019   T: 04/28/2019   MT: RICHELLE      Name:     CHINO OSUNA   MRN:      [id number]        Account:         [id number]   :      1928           Procedure Date: 2019      Document: [id number]

## 2019-04-28 NOTE — PLAN OF CARE
7B: PT orders acknowledged. Patient admit for L hip fracture, going to OR this AM. PT will re-schedule PT Evaluation when medically appropriate.

## 2019-04-28 NOTE — ANESTHESIA PREPROCEDURE EVALUATION
Anesthesia Pre-Procedure Evaluation    Patient: Bernadine Farrell   MRN:     [id number] Gender:   female   Age:    89 year old :      1928        Preoperative Diagnosis: Left Femur Fracture   Procedure(s):  OPEN REDUCTION INTERNAL FIXATION, FRACTURE, FEMUR, USING INTRAMEDULLARY JESSE     Past Medical History:   Diagnosis Date     A-fib (H)      Hypertension       Past Surgical History:   Procedure Laterality Date     CARDIAC SURGERY      TARV 2017          Anesthesia Evaluation     . Pt has had prior anesthetic. Type: MAC           ROS/MED HX    ENT/Pulmonary:     (+)tobacco use, Past use , . .    Neurologic:       Cardiovascular: Comment: TAVR in 2017    (+) hypertension----. Taking blood thinners (Plavix and ASA) : . . . :. dysrhythmias a-fib, .       METS/Exercise Tolerance:     Hematologic:         Musculoskeletal:  - neg musculoskeletal ROS (+) fracture lower extremity, -       GI/Hepatic:         Renal/Genitourinary:         Endo:         Psychiatric:         Infectious Disease:         Malignancy:         Other:    (+) no H/O Chronic Pain,no other significant disability                        PHYSICAL EXAM:   Mental Status/Neuro: A/A/O   Airway: Facies: Feasible  Mallampati: I  Mouth/Opening: Full  TM distance: > 6 cm  Neck ROM: Full   Respiratory: Auscultation: CTAB     Resp. Rate: Normal     Resp. Effort: Normal      CV: Rhythm: Regular  Rate: Age appropriate  Heart: Normal Sounds   Comments:                    No results found for: WBC, HGB, HCT, PLT, CRP, SED, NA, POTASSIUM, CHLORIDE, CO2, BUN, CR, GLC, SHANNAN, PHOS, MAG, ALBUMIN, PROTTOTAL, ALT, AST, GGT, ALKPHOS, BILITOTAL, BILIDIRECT, LIPASE, AMYLASE, ROSALES, PTT, INR, FIBR, TSH, T4, T3, HCG, HCGS, CKTOTAL, CKMB, TROPN    Preop Vitals  BP Readings from Last 3 Encounters:   19 124/56    Pulse Readings from Last 3 Encounters:   19 79      Resp Readings from Last 3 Encounters:   19 18    SpO2 Readings from Last 3 Encounters:  "  04/27/19 95%      Temp Readings from Last 1 Encounters:   04/27/19 36.3  C (97.3  F) (Oral)    Ht Readings from Last 1 Encounters:   04/27/19 1.575 m (5' 2\")      Wt Readings from Last 1 Encounters:   04/27/19 49.9 kg (110 lb)    Estimated body mass index is 20.12 kg/m  as calculated from the following:    Height as of this encounter: 1.575 m (5' 2\").    Weight as of this encounter: 49.9 kg (110 lb).     LDA:  Peripheral IV 04/27/19 Right Lower forearm (Active)   Number of days: 0            Assessment:   ASA SCORE: 4 emergent        Documentation: H&P complete; Consents complete   Proceeding: Proceed without further delay     Plan:   Anes. Type:  General   Pre-Induction: Midazolam IV; Acetaminophen PO   Induction:  IV (Standard)   Airway: Oral ETT   Access/Monitoring: PIV; 2nd PIV; A-Line   Maintenance: Balanced   Emergence: Procedure Site   Logistics: Observation/Admission     Postop Pain/Sedation Strategy:  Standard-Options: Opioids PRN     PONV Management:  Adult Risk Factors: Female, Non-Smoker, Postop Opioids  Prevention: Ondansetron; Dexamethasone     CONSENT: Direct conversation   Plan and risks discussed with: Patient   Blood Products: Consented (ALL Blood Products)                         Vivek Ramsay MD  "

## 2019-04-28 NOTE — ED TRIAGE NOTES
Pt BIBA after falling on left side while putting on shoe. Pt reports L hip pain and slightly bumping her head. Denies LOC, neck/back pain. 2mg IV dilaudid given by EMS. Pt [place]. Hx HTN.

## 2019-04-28 NOTE — PLAN OF CARE
"Time: 9051-2875  Reason for admission: Hip fracture- ORIF  VS: /44 (BP Location: Left arm)   Pulse 92   Temp 96  F (35.6  C) (Oral)   Resp 18   Ht 1.575 m (5' 2\")   Wt 49.9 kg (110 lb)   SpO2 95%   BMI 20.12 kg/m     Activity: assist of 2  Neuros: AA & Ox4, very [place]  Cardiac: WDL   Respiratory: lungs diminished in the bases   GI/: utilized the [place] cath, 400cc of urine output, +BS, no BM   Diet: tolerating clear liquid diet, no n/v  Skin:  left lateral hip and knee, surgical dressing applied, CDI   Lines: PIV to the right forearm, iv and post op abx given   Labs: no concerns   New changes this shift: ORIF done this am  Plan: CMS intact, neuro intact, denies pain, repositioned, UA sent to lab  Will continue to monitor & follow POC.   "

## 2019-04-29 ENCOUNTER — APPOINTMENT (OUTPATIENT)
Dept: GENERAL RADIOLOGY | Facility: CLINIC | Age: 84
DRG: 480 | End: 2019-04-29
Attending: NURSE PRACTITIONER
Payer: MEDICARE

## 2019-04-29 ENCOUNTER — APPOINTMENT (OUTPATIENT)
Dept: OCCUPATIONAL THERAPY | Facility: CLINIC | Age: 84
DRG: 480 | End: 2019-04-29
Attending: SURGERY
Payer: MEDICARE

## 2019-04-29 ENCOUNTER — APPOINTMENT (OUTPATIENT)
Dept: CARDIOLOGY | Facility: CLINIC | Age: 84
DRG: 480 | End: 2019-04-29
Attending: NURSE PRACTITIONER
Payer: MEDICARE

## 2019-04-29 ENCOUNTER — APPOINTMENT (OUTPATIENT)
Dept: PHYSICAL THERAPY | Facility: CLINIC | Age: 84
DRG: 480 | End: 2019-04-29
Attending: SURGERY
Payer: MEDICARE

## 2019-04-29 LAB
ABO + RH BLD: NORMAL
ABO + RH BLD: NORMAL
ANION GAP SERPL CALCULATED.3IONS-SCNC: 8 MMOL/L (ref 3–14)
BLD GP AB SCN SERPL QL: NORMAL
BLD PROD TYP BPU: NORMAL
BLD UNIT ID BPU: 0
BLD UNIT ID BPU: 0
BLOOD BANK CMNT PATIENT-IMP: NORMAL
BLOOD PRODUCT CODE: NORMAL
BLOOD PRODUCT CODE: NORMAL
BPU ID: NORMAL
BPU ID: NORMAL
BUN SERPL-MCNC: 14 MG/DL (ref 7–30)
CALCIUM SERPL-MCNC: 7.7 MG/DL (ref 8.5–10.1)
CHLORIDE SERPL-SCNC: 105 MMOL/L (ref 94–109)
CO2 SERPL-SCNC: 22 MMOL/L (ref 20–32)
CREAT SERPL-MCNC: 0.75 MG/DL (ref 0.52–1.04)
ERYTHROCYTE [DISTWIDTH] IN BLOOD BY AUTOMATED COUNT: 13.3 % (ref 10–15)
ERYTHROCYTE [DISTWIDTH] IN BLOOD BY AUTOMATED COUNT: 13.4 % (ref 10–15)
GFR SERPL CREATININE-BSD FRML MDRD: 70 ML/MIN/{1.73_M2}
GLUCOSE SERPL-MCNC: 114 MG/DL (ref 70–99)
HCT VFR BLD AUTO: 25.1 % (ref 35–47)
HCT VFR BLD AUTO: 25.8 % (ref 35–47)
HGB BLD-MCNC: 11.2 G/DL (ref 11.7–15.7)
HGB BLD-MCNC: 7.7 G/DL (ref 11.7–15.7)
HGB BLD-MCNC: 7.9 G/DL (ref 11.7–15.7)
INTERPRETATION ECG - MUSE: NORMAL
LACTATE BLD-SCNC: 1.6 MMOL/L (ref 0.7–2)
LACTATE BLD-SCNC: 3.1 MMOL/L (ref 0.7–2)
MAGNESIUM SERPL-MCNC: 1.6 MG/DL (ref 1.6–2.3)
MCH RBC QN AUTO: 29.4 PG (ref 26.5–33)
MCH RBC QN AUTO: 29.4 PG (ref 26.5–33)
MCHC RBC AUTO-ENTMCNC: 30.6 G/DL (ref 31.5–36.5)
MCHC RBC AUTO-ENTMCNC: 30.7 G/DL (ref 31.5–36.5)
MCV RBC AUTO: 96 FL (ref 78–100)
MCV RBC AUTO: 96 FL (ref 78–100)
NUM BPU REQUESTED: 2
PHOSPHATE SERPL-MCNC: 2.3 MG/DL (ref 2.5–4.5)
PLATELET # BLD AUTO: 102 10E9/L (ref 150–450)
PLATELET # BLD AUTO: 104 10E9/L (ref 150–450)
POTASSIUM SERPL-SCNC: 3.6 MMOL/L (ref 3.4–5.3)
PROCALCITONIN SERPL-MCNC: 0.16 NG/ML
RBC # BLD AUTO: 2.62 10E12/L (ref 3.8–5.2)
RBC # BLD AUTO: 2.69 10E12/L (ref 3.8–5.2)
SODIUM SERPL-SCNC: 135 MMOL/L (ref 133–144)
SPECIMEN EXP DATE BLD: NORMAL
TRANSFUSION STATUS PATIENT QL: NORMAL
WBC # BLD AUTO: 7.8 10E9/L (ref 4–11)
WBC # BLD AUTO: 8.3 10E9/L (ref 4–11)

## 2019-04-29 PROCEDURE — 25000128 H RX IP 250 OP 636: Performed by: STUDENT IN AN ORGANIZED HEALTH CARE EDUCATION/TRAINING PROGRAM

## 2019-04-29 PROCEDURE — P9016 RBC LEUKOCYTES REDUCED: HCPCS | Performed by: EMERGENCY MEDICINE

## 2019-04-29 PROCEDURE — 97535 SELF CARE MNGMENT TRAINING: CPT | Mod: GO | Performed by: OCCUPATIONAL THERAPIST

## 2019-04-29 PROCEDURE — A9270 NON-COVERED ITEM OR SERVICE: HCPCS | Performed by: SURGERY

## 2019-04-29 PROCEDURE — 93306 TTE W/DOPPLER COMPLETE: CPT

## 2019-04-29 PROCEDURE — 25000132 ZZH RX MED GY IP 250 OP 250 PS 637: Performed by: SURGERY

## 2019-04-29 PROCEDURE — 25800030 ZZH RX IP 258 OP 636: Performed by: NURSE PRACTITIONER

## 2019-04-29 PROCEDURE — 85018 HEMOGLOBIN: CPT | Performed by: STUDENT IN AN ORGANIZED HEALTH CARE EDUCATION/TRAINING PROGRAM

## 2019-04-29 PROCEDURE — 36415 COLL VENOUS BLD VENIPUNCTURE: CPT | Performed by: SURGERY

## 2019-04-29 PROCEDURE — 80048 BASIC METABOLIC PNL TOTAL CA: CPT | Performed by: NURSE PRACTITIONER

## 2019-04-29 PROCEDURE — 36415 COLL VENOUS BLD VENIPUNCTURE: CPT | Performed by: NURSE PRACTITIONER

## 2019-04-29 PROCEDURE — 93306 TTE W/DOPPLER COMPLETE: CPT | Mod: 26 | Performed by: INTERNAL MEDICINE

## 2019-04-29 PROCEDURE — 25000128 H RX IP 250 OP 636: Performed by: SURGERY

## 2019-04-29 PROCEDURE — 25000132 ZZH RX MED GY IP 250 OP 250 PS 637: Performed by: STUDENT IN AN ORGANIZED HEALTH CARE EDUCATION/TRAINING PROGRAM

## 2019-04-29 PROCEDURE — A9270 NON-COVERED ITEM OR SERVICE: HCPCS | Performed by: NURSE PRACTITIONER

## 2019-04-29 PROCEDURE — 83605 ASSAY OF LACTIC ACID: CPT | Performed by: NURSE PRACTITIONER

## 2019-04-29 PROCEDURE — 84100 ASSAY OF PHOSPHORUS: CPT | Performed by: NURSE PRACTITIONER

## 2019-04-29 PROCEDURE — 97165 OT EVAL LOW COMPLEX 30 MIN: CPT | Mod: GO | Performed by: OCCUPATIONAL THERAPIST

## 2019-04-29 PROCEDURE — 85027 COMPLETE CBC AUTOMATED: CPT | Performed by: NURSE PRACTITIONER

## 2019-04-29 PROCEDURE — 97530 THERAPEUTIC ACTIVITIES: CPT | Mod: GP | Performed by: PHYSICAL THERAPIST

## 2019-04-29 PROCEDURE — 71045 X-RAY EXAM CHEST 1 VIEW: CPT

## 2019-04-29 PROCEDURE — 99232 SBSQ HOSP IP/OBS MODERATE 35: CPT | Performed by: NURSE PRACTITIONER

## 2019-04-29 PROCEDURE — 83735 ASSAY OF MAGNESIUM: CPT | Performed by: NURSE PRACTITIONER

## 2019-04-29 PROCEDURE — 25000132 ZZH RX MED GY IP 250 OP 250 PS 637: Performed by: NURSE PRACTITIONER

## 2019-04-29 PROCEDURE — 84145 PROCALCITONIN (PCT): CPT | Performed by: NURSE PRACTITIONER

## 2019-04-29 PROCEDURE — 12000004 ZZH R&B IMCU UMMC

## 2019-04-29 PROCEDURE — 85018 HEMOGLOBIN: CPT | Performed by: NURSE PRACTITIONER

## 2019-04-29 PROCEDURE — 83605 ASSAY OF LACTIC ACID: CPT | Performed by: SURGERY

## 2019-04-29 PROCEDURE — 97110 THERAPEUTIC EXERCISES: CPT | Mod: GP | Performed by: PHYSICAL THERAPIST

## 2019-04-29 PROCEDURE — 97162 PT EVAL MOD COMPLEX 30 MIN: CPT | Mod: GP | Performed by: PHYSICAL THERAPIST

## 2019-04-29 PROCEDURE — 99223 1ST HOSP IP/OBS HIGH 75: CPT | Performed by: CLINICAL NURSE SPECIALIST

## 2019-04-29 RX ORDER — ACETAMINOPHEN 500 MG
1000 TABLET ORAL EVERY 8 HOURS PRN
Status: DISCONTINUED | OUTPATIENT
Start: 2019-04-29 | End: 2019-05-02 | Stop reason: HOSPADM

## 2019-04-29 RX ORDER — MULTIPLE VITAMINS W/ MINERALS TAB 9MG-400MCG
1 TAB ORAL DAILY
Status: DISCONTINUED | OUTPATIENT
Start: 2019-04-29 | End: 2019-05-02 | Stop reason: HOSPADM

## 2019-04-29 RX ORDER — CALCIUM CARBONATE 500(1250)
500 TABLET ORAL 2 TIMES DAILY WITH MEALS
Status: DISCONTINUED | OUTPATIENT
Start: 2019-04-29 | End: 2019-05-02 | Stop reason: HOSPADM

## 2019-04-29 RX ORDER — SODIUM CHLORIDE, SODIUM LACTATE, POTASSIUM CHLORIDE, CALCIUM CHLORIDE 600; 310; 30; 20 MG/100ML; MG/100ML; MG/100ML; MG/100ML
1000 INJECTION, SOLUTION INTRAVENOUS CONTINUOUS
Status: DISCONTINUED | OUTPATIENT
Start: 2019-04-29 | End: 2019-04-29

## 2019-04-29 RX ORDER — CEFTRIAXONE 1 G/1
1 INJECTION, POWDER, FOR SOLUTION INTRAMUSCULAR; INTRAVENOUS EVERY 24 HOURS
Status: DISCONTINUED | OUTPATIENT
Start: 2019-04-29 | End: 2019-05-01

## 2019-04-29 RX ORDER — SODIUM CHLORIDE, SODIUM LACTATE, POTASSIUM CHLORIDE, CALCIUM CHLORIDE 600; 310; 30; 20 MG/100ML; MG/100ML; MG/100ML; MG/100ML
INJECTION, SOLUTION INTRAVENOUS CONTINUOUS
Status: DISCONTINUED | OUTPATIENT
Start: 2019-04-29 | End: 2019-04-30

## 2019-04-29 RX ADMIN — ACETAMINOPHEN 650 MG: 325 TABLET, FILM COATED ORAL at 07:57

## 2019-04-29 RX ADMIN — SODIUM CHLORIDE, POTASSIUM CHLORIDE, SODIUM LACTATE AND CALCIUM CHLORIDE: 600; 310; 30; 20 INJECTION, SOLUTION INTRAVENOUS at 17:41

## 2019-04-29 RX ADMIN — SENNOSIDES AND DOCUSATE SODIUM 2 TABLET: 8.6; 5 TABLET ORAL at 20:42

## 2019-04-29 RX ADMIN — CALCIUM 500 MG: 500 TABLET ORAL at 20:42

## 2019-04-29 RX ADMIN — SODIUM CHLORIDE 500 ML: 9 INJECTION, SOLUTION INTRAVENOUS at 03:21

## 2019-04-29 RX ADMIN — VITAMIN D, TAB 1000IU (100/BT) 1000 UNITS: 25 TAB at 12:38

## 2019-04-29 RX ADMIN — CEFTRIAXONE SODIUM 1 G: 1 INJECTION, POWDER, FOR SOLUTION INTRAMUSCULAR; INTRAVENOUS at 05:23

## 2019-04-29 RX ADMIN — CEFAZOLIN 1 G: 330 INJECTION, POWDER, FOR SOLUTION INTRAMUSCULAR; INTRAVENOUS at 02:10

## 2019-04-29 RX ADMIN — SODIUM CHLORIDE 500 ML: 9 INJECTION, SOLUTION INTRAVENOUS at 04:56

## 2019-04-29 RX ADMIN — LEVOTHYROXINE SODIUM 50 MCG: 50 TABLET ORAL at 07:58

## 2019-04-29 RX ADMIN — CALCIUM 500 MG: 500 TABLET ORAL at 12:38

## 2019-04-29 RX ADMIN — SODIUM CHLORIDE 500 ML: 9 INJECTION, SOLUTION INTRAVENOUS at 23:49

## 2019-04-29 RX ADMIN — ACETAMINOPHEN 650 MG: 325 TABLET, FILM COATED ORAL at 11:20

## 2019-04-29 RX ADMIN — ACETAMINOPHEN 1000 MG: 500 TABLET, FILM COATED ORAL at 17:39

## 2019-04-29 RX ADMIN — ACETAMINOPHEN 650 MG: 325 TABLET, FILM COATED ORAL at 03:17

## 2019-04-29 RX ADMIN — MULTIPLE VITAMINS W/ MINERALS TAB 1 TABLET: TAB at 11:20

## 2019-04-29 RX ADMIN — SODIUM CHLORIDE, POTASSIUM CHLORIDE, SODIUM LACTATE AND CALCIUM CHLORIDE 1000 ML: 600; 310; 30; 20 INJECTION, SOLUTION INTRAVENOUS at 08:00

## 2019-04-29 RX ADMIN — LIDOCAINE 1 PATCH: 560 PATCH PERCUTANEOUS; TOPICAL; TRANSDERMAL at 07:57

## 2019-04-29 RX ADMIN — ATORVASTATIN CALCIUM 10 MG: 10 TABLET, FILM COATED ORAL at 20:42

## 2019-04-29 RX ADMIN — SODIUM CHLORIDE, POTASSIUM CHLORIDE, SODIUM LACTATE AND CALCIUM CHLORIDE 1000 ML: 600; 310; 30; 20 INJECTION, SOLUTION INTRAVENOUS at 02:10

## 2019-04-29 RX ADMIN — SENNOSIDES AND DOCUSATE SODIUM 2 TABLET: 8.6; 5 TABLET ORAL at 07:58

## 2019-04-29 ASSESSMENT — ACTIVITIES OF DAILY LIVING (ADL)
ADLS_ACUITY_SCORE: 17
PREVIOUS_RESPONSIBILITIES: MEAL PREP
ADLS_ACUITY_SCORE: 17
ADLS_ACUITY_SCORE: 17

## 2019-04-29 NOTE — PROGRESS NOTES
[facility], [place]  Trauma Service Progress Note    Date of Service (when I saw the patient): 04/29/2019  Assessment & Plan   Trauma Mechanism: Ground level fall  Date and time of injury: 04/27/2019  About 1730  Known Injuries:  1. Left intertrochanteric fracture                   Other diagnoses:  1. Acute traumatic pain  2. Acute blood loss anemia  3. UTI  4. Hypertension  5. CAD stents on dual antiplatelet therapy, ASA and Plavix  6. Hx TAVR 2017- Aortic stenosis  7. Hx pAfib  8. Hypothyroidism      Procedure(s):  04/28/2019 S/P Intermedullary nailing - Dr. Das  Plan:  1. Tertiary completed today, April 29, 2019 with no other obvious injuries noted. At the time of my exam Mrs. Topete was up in the chair eating breakfast. She reported minimal left thigh pain, denied SOB or other pain  1. Femur fracture: POD #1 S/P left femur IMN  1. OOB to chair, weight bearing as tolerated  2. PT/OT  3. Ortho to change dressing  4. Calcium and Vit D supplements  5. Mechanical DVT prophylaxis  6. [facility] with Dr. Das in 2 weeks for wound check and evaluation in clinic   2. Resp: No acute issues, Pulmonary toilet pre and post op  3. Neuro/ Pain: Declined head CT on admission. A x O x 3, denies headache, no visible signs of head trauma.  1. Continue routine neurochecks  2. Acute pain: Left hip pain, denies other pain. Change to scheduled Tylenol, Lidoderm patches, PRN Oxycodone for sever pain. Ice to left hip.  3. Routine neuro checks  4. Delirium prevention measures: high risk given advanced age, recent trauma and hospitalization, narcotic medications and pain. Early mobility post op as able, awake during the day as much as able, allow for night time rest, pain control  4. Cardiac: Hypotensive last night requiring total of 1L fluid bolus with noted improvement. Also noted 5gm drop in hemoglobin 12-7.7 now. Denies shortness of breath and chest pain, denies  lightheadedness while up in the chair. Net +1970 since admission.  1. Hx Hypertension: Holding all antihypertensives including BB, resume as able  2. PRBC transfusion as below  3. Hold her MIVF for now, encourage PO intake, give blood as above  4. Hold ASA and Plavix for now given concern for ongoing  5. Heme  1. Hemoglobin dropped from 12-->7.7 today. Left thigh remains swollen and soft, distal pulses palpable +1, LLE warm to touch, dressing CDI. Documented EBL 100ml. Blood loss is anticipated with long large bone fracture, except ongoing oozing post op, on Plavix and ASA PTA some dilutional component with IVF. Plan:  2. Transfuse 1 unit PRBC now  3. Hbg Q 8 hours  4. Transfuse as needed for Hbg <7  5. Hold chemical DVT prophylaxis, ASA, Plavix until upward steady trend in hemoglobin  6. FEN: Electrolytes WNL,  7. GI:  Regular diet  8. : UA obtained, no calderon +nitrites, large leuk est, wbc 69.   1. Urinary tract infection: Continue IV Ceftriaxone for now until cultures result.  2. Strict I/O  9. ID: Afebrile, normal white count, Ceftriaxone as above for UTI  10. Endocrine:   1. Hypothyroidism: Resume home Levothyroxine  2. Blood glucose WNL, monitor   11. Palliative care consult: routine geriatric trauma given advanced age, femur fracture.  12. PT/OT consults  13. Social work- discharge planning     General Cares:               PPI/H2 blocker: N/A              DVT prophylaxis: Mechanical preop              Bowel Regimen/Date of last stool: PTA- ordered              Pulmonary toilet: IS, cough and deep breath              ETOH screen completed- denies alcohol consumption              Lines / drains: PIV  Discharge goals:     Adequate pain management: ongoing    VSS x24 hours: ongoing    Hemoglobin stable x 48 hours: No    Ambulating safely and/or therapy evals complete: ongoing    Drains/lines removed or plan in place to manage: yes    Teaching done: ongoing    Other:  Expected D/C date: Wed vs Thur, given stable  hemoglobin, pain control    Interval History   Up in the chair eating breakfast at the time of my visit. States minimal pain. Denies feeling lightheaded,or dizzy when she got up this morning. Denies shortness of breath or chest pain.  ROS x 8 negative with exception of those things listed in interval hx    Physical Exam   Temp: 96.4  F (35.8  C) Temp src: Axillary BP: 146/55   Heart Rate: 103 Resp: 16 SpO2: 92 % O2 Device: None (Room air) Oxygen Delivery: 2 LPM  Vitals:    04/27/19 1919   Weight: 49.9 kg (110 lb)     Vital Signs with Ranges  Temp:  [95.6  F (35.3  C)-98.4  F (36.9  C)] 96.4  F (35.8  C)  Heart Rate:  [] 103  Resp:  [12-18] 16  BP: ()/(42-89) 146/55  MAP:  [82 mmHg-107 mmHg] 90 mmHg  Arterial Line BP: (138-176)/(49-61) 149/51  SpO2:  [92 %-100 %] 92 %  I/O last 3 completed shifts:  In: 3080 [P.O.:480; I.V.:1600; IV Piggyback:1000]  Out: 550 [Urine:450; Blood:100]    Sowmya Coma Scale - Total 14/15  Eye Response (E): 4   4= spontaneous, 3= to verbal/voice, 2= to pain, 1= No response   Verbal Response (V): 4 forgetful  5= Orientated, converses, 4= Confused, converses, 3= Inappropriate words, 2= Incomprehensible sounds, 1=No response   Motor Response (M): 6   6= Obeys commands, 5= Localizes to pain, 4= Withdrawal to pain, 3=Fexion to pain, 2= Extension to pain, 1= No response     Constitutional: Awake, alert, cooperative, no apparent distress.  Eyes: Lids and lashes normal, pupils equal, round and reactive to light, extra ocular muscles intact, sclera clear, conjunctiva normal.  ENT: Normocephalic, atraumatic  Respiratory: No increased work of breathing, good air exchange, clear to auscultation bilaterally, no crackles or wheezing.  Cardiovascular:  regular rate and rhythm, normal S1 and S2  GI: Normal bowel sounds, abdomen soft, non-distended, non-tender, no guarding  Genitourinary:  Voids spontaneously, Not seen  Skin:  Normal skin color, no redness, warmth, or swelling, no ecchymosis,  left thigh incision covered, dressing CDI  Musculoskeletal: Tenderness with palpation of the left distal femur, thigh is soft, no ecchymosis, +2 Pedal pulse palpated.  Neurologic: Awake, alert, oriented. Cranial nerves II-XII are grossly intact.  Strength and sensory is intact. No focal deficits.  Neuropsychiatric: Calm, normal eye contact, alert, affect appropriate to situation, oriented, thought process normal.    LV Jones CNP  To contact the trauma service use job code pager [phone],   Numeric texts or alpha text through [facility]

## 2019-04-29 NOTE — PLAN OF CARE
Vital signs:  Temp: 98.2  F (36.8  C) Temp src: Oral BP: 94/58   Heart Rate: 90 Resp: 16 SpO2: 95 % O2 Device: None (Room air)     Activity: WBAT, has not gotten OOB yet. Turned as needed, pt able to turn independently as well.  Neuros: A&O x4.   Cardiac: WDL.   Respiratory: WDL on RA. Denies SOB.   GI/: 1 unmeasured urine occurrence in bedpan. Purewick in place, output of 50 mL. +BS, passing flatus, 1 BM in evening.  Diet: Clear, tolerating, advanced to Regular this AM.  Lines: Right PIV infusing LR @ 50mL/hr.   Pain/nausea: Declined pain meds in evening. Tylenol given x1 overnight. Denies nausea.  New changes this shift: BP 88/53, Provider notified, 500 mL NS bolus given, stat Hgb level of 7.7. BP recheck 77/51, another 500 mL NS bolus given, new order for rocephin given for UTI. BP 94/58 after boluses, Provider aware.  Plan: PT/OT consult.

## 2019-04-29 NOTE — PROGRESS NOTES
"Vital signs:  Temp: 97.1  F (36.2  C)(oral) Temp src: Oral BP: (!) 79/54(trauma team aware) Pulse: 93 Heart Rate: 93 Resp: 18 SpO2: 98 % O2 Device: None (Room air) Oxygen Delivery: 2 LPM Height: 157.5 cm (5' 2\") Weight: 49.9 kg (110 lb)  Estimated body mass index is 20.12 kg/m  as calculated from the following:    Height as of this encounter: 1.575 m (5' 2\").    Weight as of this encounter: 49.9 kg (110 lb).      Hypotensive, now infusing 2nd bag of blood infusion. 2U of blood per trauma team. No signs of reaction noted, pt alert & sleeping between cares.     Emilia Davis RN   "

## 2019-04-29 NOTE — PROGRESS NOTES
04/29/2019    Trauma Cross Cover Note      Paged by nursing regarding hypotension, last BP 77/51 after 500 ml bolus infused.    Patient examined at bedside  Easily arousable, awake, oriented.  Denies chest pain, lightheadedness, pain    On recheck BP 98/68  Left lower extremity soft, no signs of bleeding    Bladder scan showing 74ml  Morning labs pending    Ceftriaxone started for UTI from UA   Additional 500cc bolus ordered  Hold amlodipine, Imdur this AM       Diomedes Deleon MD  Surgery PGY-4  [phone]

## 2019-04-29 NOTE — CONSULTS
[facility]  Palliative Care Consultation Note    Patient: Bernadine Farrell  Date of Admission:  4/27/2019    Requesting Clinician / Team: Allan Huff APRN CNP  Reason for consult: Goals of care    Recommendations:    TCU    Restorative goals    Full code currently, patient and son will discuss further her wishes re: CPR    In terms of symptoms could schedule tyenol, agree with oxycodone low dose PRN for pain not controlled on tylenol    These recommendations have been discussed with primary team.      Thank you for the opportunity to participate in the care of this patient and family. Our team: will continue to follow.     During regular M-F work hours--if you are not sure who specifically to contact--please contact us by sending a text page to our team consult pager at [phone].    After regular work hours and on weekends/holidays, you can call our answering service at [phone]. Also, who's on call for us is available in [employer].       Assessments:  Bernadine Farrell is a 89 year old female I saw today for pain management and goals of care in the setting of a fall and now s/p left femur intermedullary nailing for traumatic femur fracture on 4/28.     Today, the patient was seen for: goals of care and symptom management     Prognosis, Goals, & Planning:      Functional Status just prior to hospitalization: 1 (Restricted in physically strenuous activity but ambulatory and able to carry out work of a light or sedentary nature)      Prognosis, Goals, and/or Advance Care Planning were addressed today: Yes    Summary of any discussion: discussed goals of care today with patient and son. I met with patient and son today to introduce the Palliative Care service. We discussed her recent fall, which resulted in this hospitalization. We also discussed how traumas in the elderly often result in a downhill trajectory for people, and have increased mortality  risk at six months. Discussed hopes of getting her to rehab and she was at one before after a hospitalization for her heart when she was in [place]. Discussed that prior she has had times where her heart gets out of rhythm and she was shocked back into rhythm, he doesn't think that she has ever needed CPR. Discussed CPR in light of her recent trauma and her age and he seems to think possibly she wouldn't want CPR but wanted to talk with her more about that. Patient was minimally engaged in the discussion during this visit.      Patient's decision making preferences: shared with support from loved ones            I have concerns about the patient/family's health literacy today: No      Patient has a completed Health Care Directive: No.       Code status: full code    Coping, Meaning, & Spirituality:   Mood, coping, and/or meaning in the context of serious illness were addressed today: No    Social:     Living situation: in apartment with son    Marti family / caregivers: son    History of Present Illness:  History gathered today from: family/loved ones, medical chart, medical team members    Bernadine Farrell is a 89 year old female who presents after a fall wit left hip pain from standing, developed left hip pain after the fall. Now s/p left femur intermedullary nailing for traumatic femur fracture on 4/28.     Palliative care consulted 4/28 for patient with a fall and femur fracture requiring surgery.    Key Palliative Symptom Data:  # Pain severity the last 12 hours: moderate  # Dyspnea severity the last 12 hours: none  # Nausea severity the last 12 hours: none  # Anxiety severity the last 12 hours: none     Pain: it is ok unless she moves, main source of pain is in her left knee, taking tylenol, declined therapy today due to pain.    Patient is on opioids: bowels not assessed today.    ROS:  Comprehensive ROS is reviewed and is negative except as here & per HPI:      Past Medical History:  Past Medical  History:   Diagnosis Date     A-fib (H)      Hypertension         Past Surgical History:  Past Surgical History:   Procedure Laterality Date     CARDIAC SURGERY      TARV 2017     OPEN REDUCTION INTERNAL FIXATION RODDING INTRAMEDULLARY FEMUR Left 4/28/2019    Procedure: OPEN REDUCTION INTERNAL FIXATION, FRACTURE, FEMUR, USING INTRAMEDULLARY JESSE;  Surgeon: Kofi Das MD;  Location: [facility] OR         Family History:  History reviewed. No pertinent family history.      Allergies:  No Known Allergies     Medications:  I have reviewed this patient's medication profile and medications from this hospitalization.   Noted scheduled meds are:  Lidocaine patch  Senna 1-2 tablets BID    Noted PRN meds are:  Acetaminophen PRN- x 3  Hydromorphone 0.2 mg q2h PRN- x2    Physical Exam:  Vital Signs: Temp: 96.4  F (35.8  C) Temp src: Axillary BP: 146/55   Heart Rate: 103 Resp: 16 SpO2: 92 % O2 Device: None (Room air) Oxygen Delivery: 2 LPM  Weight: 110 lbs 0 oz     Constitutional: Awake, alert, cooperative, no apparent distress  Lungs: No increased work of breathing  Neurologic: Awake, alert, oriented to name, place and time.   Neuropsychiatric: Normal affect, mood, orientation, memory and insight.  Skin: No rashes    Data reviewed:  Recent lab data reviewed:   CMP  Recent Labs   Lab 04/29/19 0345 04/28/19  1710 04/27/19  2133     --  137   POTASSIUM 3.6  --  3.4   CHLORIDE 105  --  104   CO2 22  --  23   ANIONGAP 8  --  10   *  --  147*   BUN 14  --  15   CR 0.75 0.72 0.73   GFRESTIMATED 70 73 72   GFRESTBLACK 81 84 83   SHANNAN 7.7*  --  8.6   MAG 1.6  --   --    PHOS 2.3*  --   --      CBC  Recent Labs   Lab 04/29/19 0345 04/28/19  1710 04/27/19  2133   WBC 7.8  --  11.1*   RBC 2.62*  --  4.04   HGB 7.7*  --  12.0   HCT 25.1*  --  36.8   MCV 96  --  91   MCH 29.4  --  29.7   MCHC 30.7*  --  32.6   RDW 13.4  --  13.0   * 117* 161       LV Layne CNS  Palliative Care Consult Team  Pager:  [phone]     Total time spent was 70 minutes,  >50% of time was spent counseling and/or coordination of care regarding goals of care and symptom management.

## 2019-04-29 NOTE — PROGRESS NOTES
"Orthopaedic Surgery Progress Note     Subjective: Hypotensive overnight, resolved with IV fluid boluses. Pain well controlled this AM. Voiding spontaneously.  Some confusion this AM but denies CP, SOB, numbness or tingling, motor dysfunction or weakness.     Objective: BP 94/58 (BP Location: Right arm)   Pulse 92   Temp 98.2  F (36.8  C)   Resp 16   Ht 1.575 m (5' 2\")   Wt 49.9 kg (110 lb)   SpO2 95%   BMI 20.12 kg/m        General: NAD, alert somewhat confused, cooperative with exam.   Cardio: extremities wwp.   Respiratory: Non-labored breathing on room air.  MSK:   LLE: Toes wwp, DP/PT pulses palpable, cap refill < 2 secs  Fires EHL/FHL/GSC/TA  SILT SP/DP/Sa/Hand/T  Dressing c/d/i w/ 2 cm of strikethrough both proximally and distally.    Labs:  Hemoglobin   Date Value Ref Range Status   04/29/2019 7.7 (L) 11.7 - 15.7 g/dL Final   ]  All cultures:  Recent Labs   Lab 04/28/19  1754   CULT PENDING       Imaging: Complete    Assessment and Plan: Bernadine Farrell is a 89 year old female with a left intertrochanteric hip fracture following mechanical fall sustained on 4/27/2019.    Now s/p L intertrochanteric fracture IMN w/ Dr. Das on 4/28/2019. Doing well     Trauma Primary, appreciate cares  Activity: Up with assist.  Weight bearing status: WBAT LLE  Imaging: Complete  Antibiotics: Perioperative antibiotics x24 hours  Diet: Begin with clear fluids and progress diet as tolerated. Nutrition consult ordered for dietary optimization to maximize appropriate healing response.  DVT prophylaxis: Lovenox 0.5 mg/kg x 6 weeks  Wound Care: Aquacel dressing change on POD #2-3 per ortho.  Pain management: transition from IV to orals as tolerated.    Physical Therapy/Occupational Therapy: Gait, transfers, ROM, ADL's.   Consults: PT, OT, Nutrition, appreciate assistance in caring for this patient.    Follow-up: Clinic with Dr. Das in 2 weeks for wound check and evaluation in clinic (schedulers emailed)     Bassam " MD [facility]  Orthopaedic Surgery, PGY1  Pager: [phone]           For questions about this patient during the day, please attempt to contact me at my pager ([phone]) prior to contacting the Orthopaedic Surgery resident on call. Thank you!

## 2019-04-29 NOTE — PROVIDER NOTIFICATION
500 mL NS bolus finished, BP recheck 84/57 and 77/51, HR 80s-90s. Bladder scanned for 74 mL. Only had about 50 mL urine output since  8pm. Provider notified.

## 2019-04-29 NOTE — PLAN OF CARE
7B OT Evaluation Complete and Treatment Initiated  Discharge Planner OT   Patient plan for discharge: Unknown  Current status: Pt alert and appropriately conversational.  Mod A supine to EOB, Mod A with FWW sit to stand though unable to advance feet to take steps for transfer.  Mod-max A x 1 and cues for stand pivot transfer without FWW.  VSS with /47 seated in bedside chair  Barriers to return to prior living situation: Mobility, decreased activity tolerance, decreased independence with ADL, acute medical needs  Recommendations for discharge: TCU  Rationale for recommendations: Pt is currently below baseline with regards to mobility and independence with self cares and will benefit from continued skilled therapy intervention to address deficits.         Entered by: Tanisha Ivan 04/29/2019 9:11 AM

## 2019-04-29 NOTE — PROGRESS NOTES
"Orthopaedic Surgery Progress Note     Subjective: Received blood transfusion yesterday for Hgb 12 -> 7.7 postop. Patient worked w/ PT/OT, they are recommending TCU on discharge. Pain w/ knee ROM. Pain well controlled this AM. Voiding spontaneously.  Denies CP, SOB, numbness or tingling, motor dysfunction or weakness. Bandages changed.    Objective: BP (!) 172/92 (BP Location: Right arm)   Pulse 93   Temp 98.8  F (37.1  C) (Oral)   Resp 16   Ht 1.575 m (5' 2\")   Wt 49.9 kg (110 lb)   SpO2 97%   BMI 20.12 kg/m      General: NAD, alert somewhat confused, cooperative with exam.   Cardio: extremities wwp.   Respiratory: Non-labored breathing on room air.  MSK:   LLE: Toes wwp, DP/PT pulses palpable, cap refill < 2 secs  Fires EHL/FHL/GSC/TA  SILT SP/DP/Sa/Hand/T  Dressing c/d/i w/ 2 cm of strikethrough both proximally and distally.    Labs:  Hemoglobin   Date Value Ref Range Status   04/30/2019 11.1 (L) 11.7 - 15.7 g/dL Final     Imaging: Complete    Assessment and Plan: Bernadine Farrell is a 89 year old female with a left intertrochanteric hip fracture following mechanical fall sustained on 4/27/2019.    Now s/p L intertrochanteric fracture IMN w/ Dr. Das on 4/28/2019. Doing well     Trauma Primary, appreciate cares  Activity: Up with assist.  Weight bearing status: Change to TTWB LLE given knee pain; continue to work on knee ROM  Imaging: Complete  Antibiotics: Perioperative antibiotics x24 hours  Diet: Begin with clear fluids and progress diet as tolerated. Nutrition consult ordered for dietary optimization to maximize appropriate healing response.  DVT prophylaxis: Lovenox 0.5 mg/kg x 6 weeks  Wound Care: Dressing changed today; please reinforce PRN but do not change so ortho may re-check tomorrow AM.   Pain management: transition from IV to orals as tolerated.    Physical Therapy/Occupational Therapy: Gait, transfers, ROM, ADL's.   Consults: PT, OT, Nutrition, appreciate assistance in caring for this " patient.    Follow-up: Clinic with Dr. Das in 2 weeks for wound check and evaluation in clinic (schedulers emailed)     Bassam Newton MD  Orthopaedic Surgery, PGY1  Pager: [phone]           For questions about this patient during the day, please attempt to contact me at my pager ([phone]) prior to contacting the Orthopaedic Surgery resident on call. Thank you!

## 2019-04-29 NOTE — CONSULTS
Social Work: Assessment with Discharge Plan    Patient Name:  Bernadine Farrell  :  1928  Age:  89 year old  MRN:  [id number]  Risk/Complexity Score:     Completed assessment with:  Pt, son    Presenting Information   Reason for Referral:  Discharge plan  Date of Intake:  2019  Referral Source:  Chart Review  Decision Maker:  Pt when able  Alternate Decision Maker:  NOK is adult son Dewayne.  Unclear if a HCD has been made.    Health Care Directive:  Not on file  Living Situation:  Apartment  Previous Functional Status:  Assistance with Other:  ADLs per adult son.  Pt at baseline lives with him.  Son provides supportive supervision for activities as needed.   Patient and family understanding of hospitalization:  Pt states she does not want to go to a nursing home.  Pt will be considering rehab due to her fracture injury.  Cultural/Language/Spiritual Considerations:  None reported  Adjustment to Illness:  Pt is very hard of hearing.  Difficult to assess adjustment as pt has difficulty with answering questions she cannot hear well.   Would recommend ordering a pocket talker for hearing assistance.    Physical Health  Reason for Admission:    1. Closed intertrochanteric fracture of hip, left, initial encounter (H)      Services Needed/Recommended:  TCU    Mental Health/Chemical Dependency  Diagnosis:  None reported  Support/Services in Place:  None  Services Needed/Recommended:  None    Support System  Significant relationship at present time:  Son Dewayne is supportive and wants pt to be close to home so that he can continue to visit daily.  Family of origin is available for support:  Yes - son will be at home for support post TCU  Other support available:  Son interested in homecare after TCU.  Gaps in support system:  Unclear  Patient is caregiver to:  None     Provider Information   Primary Care Physician:  Dewayne Goyal   [phone]   [address]  [address]  / [facility]      :  None    Financial   Income Source:  Retired  Financial Concerns:  None reported.  Insurance:    Payor/Plan Subscriber Name Rel Member # Group #   MEDICARE - MEDICARE SANYA [place]NA*  [id number]       ATTN CLAIMS, [address]       Discharge Plan   Patient and family discharge goal:  TCU  Provided education on discharge plan:  YES  Patient agreeable to discharge plan:  YES  A list of Medicare Certified Facilities was provided to the patient and/or family to encourage patient choice. Patient's choices for facility are:    [facility]  [phone]    Pt will review the list for further options.  Referral started for [facility] as discussed with family.  Will NH provide Skilled rehabilitation or complex medical:  YES  General information regarding anticipated insurance coverage and possible out of pocket cost was discussed. Patient and patient's family are aware patient may incur the cost of transportation to the facility, pending insurance payment: YES MARTIN discussed with family that Medicare coverage is at 100% for days 1-20.  At day 21 coverage is about 70-80% and the additional uncovered fees are billed to pt's secondary insurance.  SW discussed that it is the discretion of the insurance companies to cover costs.  Pt has used 0 of Medicare days per pt's knowledge.    Barriers to discharge:  Medical stability, bed availability    Discharge Recommendations   Anticipated Disposition:  Facility:  TCU  Transportation Needs:  Family:  Son if needed  Name of Transportation Company and Phone:  TBD    Additional comments   MARTIN met with pt and family to introduce self and role in consult.  Partial consult completed as pt was tired and hard of hearing.  Pt able to answer questions completely and accurately when she can hear the question. Would recommend pocket talker due to hard of hearing status.  Pt prefers placement at a rehab center over a nursing home with a rehab wing.  Pt and son  were going to review places for son to visit tomorrow in the event pt needs additional community referrals for TCU.    Will hand off to unit SW for coverage.    Denise Shah [facility]  6C Unit  - coverage for 7B  Phone: [phone]  Pager: [phone]  Unit: [phone]

## 2019-04-29 NOTE — PROGRESS NOTES
04/29/19 0800   Quick Adds   Type of Visit Initial Occupational Therapy Evaluation   Living Environment   Lives With child(clive), adult   Living Arrangements apartment   Home Accessibility no concerns   Transportation Anticipated family or friend will provide   Living Environment Comment Pt lives on 17th floor of apt building with adult son. Pt's son works during the day, and pt is alone while son is at work. Elevator available, no stairs within or to enter home. Pt has tub shower without grab bars. Pt does not use shower chair. Toilet does not have grab bars.    Self-Care   Usual Activity Tolerance good   Current Activity Tolerance fair   Regular Exercise No   Equipment Currently Used at Home cane, straight   Activity/Exercise/Self-Care Comment Pt reports occasional use of SPC, but does not consistently use at baseline.    Functional Level   Ambulation 1-->assistive equipment   Transferring 0-->independent   Toileting 0-->independent   Bathing 0-->independent   Dressing 0-->independent   Eating 0-->independent   Communication 0-->understands/communicates without difficulty   Swallowing 0-->swallows foods/liquids without difficulty   Cognition 0 - no cognition issues reported   Fall history within last six months yes   Number of times patient has fallen within last six months 2   Which of the above functional risks had a recent onset or change? ambulation;fall history   Prior Functional Level Comment Pt reports prior independence with self cares. Pt reports primarily sponge bathing, however, son is available to assist prn for tub transfer when pt showers/bathes. Pt has fallen twice while donning/doffing shoes in past 6 months.    General Information   Onset of Illness/Injury or Date of Surgery - Date 04/28/19   Referring Physician  Bassam Newton MD   Patient/Family Goals Statement Increase independence and return home   Additional Occupational Profile Info/Pertinent History of Current Problem Bernadine Caldera  Jami is a 89 year old female with a left intertrochanteric hip fracture following mechanical fall sustained on 4/27/2019.   Precautions/Limitations fall precautions   Weight-Bearing Status - LLE weight-bearing as tolerated   General Observations  Pt pleasant and agreeable to therapy. Pt with hearing deficits at baseline.   General Info Comments Activity: Up with assist; LLE WBAT   Cognitive Status Examination   Orientation person;time   Level of Consciousness alert   Follows Commands (Cognition) WFL   Memory intact   Attention No deficits were identified   Organization/Problem Solving No deficits were identified   Executive Function No deficits were identified   Cognitive Comment Pt appropriate in conversation. Oriented to person and time, however, was not oriented to place. Pt reports knowing the ambulance transported her here, but did not know to which hospital. Pt ability to follow commands and answer questions limited by hearing impairment.   Visual Perception   Visual Perception Wears glasses   Visual Perception Comments Pt reports no recent changes in vision since hospital admission   Sensory Examination   Sensory Quick Adds No deficits were identified   Pain Assessment   Patient Currently in Pain Yes, see Vital Sign flowsheet  (L hip/knee pain)   Integumentary/Edema   Integumentary/Edema Comments Incision at L hip; edema in surgical area   Posture   Posture forward head position;protracted shoulders   Range of Motion (ROM)   ROM Quick Adds No deficits were identified   ROM Comment BUE: shoulder flexion approx 165 degrees, shoulder adduction approx 110 degrees, elbow flexion/extension approx 140-0 degrees. Thumb and digit opposition WFL   Strength   Strength Comments BUE: shoulder flexion 4/5, shoulder abduction 4/5, elbow flexion 4/5   Hand Strength   Hand Strength Comments  strength 4/5, WFL   Mobility   Bed Mobility Bed mobility skill: Supine to sit;Bed mobility skill: Rolling/Turning   Bed  Mobility Skill: Rolling/Turning   Level of [place] - Bed Mobility Skill Rolling Turning moderate assist (50% patients effort)   Physical Assist/Nonphysical Assist 1 person assist;verbal cues   Assistive Device:  Rolling/Turning bed rails   Bed Mobility Skill: Supine to Sit   Level of [place]: Supine/Sit moderate assist (50% patients effort)   Physical Assist/Nonphysical Assist: Supine/Sit 1 person assist;verbal cues   Assistive Device: Supine/Sit bedrail   Transfer Skills   Transfer Transfer Skill: Stand to Sit   Transfer Skill: Bed to Chair/Chair to Bed   Level of [place]: Bed to Chair maximum assist (25% patients effort)   Physical Assist/Nonphysical Assist: Bed to Chair 1 person assist   Weight-Bearing Restrictions weight-bearing as tolerated   Transfer Skill: Sit to Stand   Level of [place]: Sit/Stand moderate assist (50% patients effort)   Physical Assist/Nonphysical Assist: Sit/Stand 1 person assist;verbal cues   Transfer Skill: Sit to Stand weight-bearing as tolerated   Assistive Device for Transfer: Sit/Stand standard walker   Balance   Balance Comments Pt able to sit EOB for approx 5 min. SBA.  Pt Min A while standing with walker   Lower Body Dressing   Level of [place]: Dress Lower Body maximum assist (25% patients effort)   Eating/Self Feeding   Level of [place]: Eating stand-by assist   Physical Assist/Nonphysical Assist: Eating set-up required   Instrumental Activities of Daily Living (IADL)   Previous Responsibilities meal prep   IADL Comments Pt reports son assists with all IADL. Pt reports participating in meal prep activities on stove top, but does not use oven.   Activities of Daily Living Analysis   Impairments Contributing to Impaired Activities of Daily Living balance impaired;pain;post surgical precautions;strength decreased   General Therapy Interventions   Planned Therapy Interventions ADL retraining;bed mobility training;strengthening;cognition   Clinical  Impression   Criteria for Skilled Therapeutic Interventions Met yes, treatment indicated   OT Diagnosis Decreased activity tolerance and mobility   Influenced by the following impairments decreased strength, mobility, pain   Assessment of Occupational Performance 3-5 Performance Deficits   Identified Performance Deficits bed mobility, transfers, dressing, bathing, IADL   Clinical Decision Making (Complexity) Low complexity   Therapy Frequency daily   Predicted Duration of Therapy Intervention (days/wks) 05/06/19   Anticipated Discharge Disposition Transitional Care Facility   Risks and Benefits of Treatment have been explained. Yes   Patient, Family & other staff in agreement with plan of care Yes   Clinical Impression Comments  Pt pleasant and agreeable to therapy.   Total Evaluation Time   Total Evaluation Time (Minutes) 5

## 2019-04-29 NOTE — PLAN OF CARE
"BP 96/55 (BP Location: Right arm)   Pulse 96   Temp 97.6  F (36.4  C) (Oral)   Resp 19   Ht 1.575 m (5' 2\")   Wt 49.9 kg (110 lb)   SpO2 99%   BMI 20.12 kg/m       Soft BP- held metoprolol & pt receiving 1 U blood for Hgb 7.9 (infusing currently). Other VSS. Voiding adequately- pure wick on for incontinent. Last BM 4/28. Good PO intake. Ax2 when OOB. Left hip & knee dressing intact- CMS intact, swelling (cold pack applied & LLE elevated).     Continue POC & monitoring BP. Recheck Hgb 1900.   "

## 2019-04-29 NOTE — PROGRESS NOTES
"CLINICAL NUTRITION SERVICES - ASSESSMENT NOTE     Nutrition Prescription    RECOMMENDATIONS FOR MDs/PROVIDERS TO ORDER:  Recommend checking ionized calcium and vitamin D status    Malnutrition Status:    Patient does not meet two of the above criteria necessary for diagnosing malnutrition but is at risk for malnutrition    Recommendations already ordered by Registered Dietitian (RD):  Boost Plus daily  Thera-vit-m    Future/Additional Recommendations:  Adjust supplements per pt preference  Encourage meals TID plus oral nutrition supplement daily     REASON FOR ASSESSMENT  Bernadine Farrell is a/an 89 year old female assessed by the dietitian for Admission Nutrition Risk Screen for unintentional loss of 10# or more in the past two months and reduced oral intake over the last month and Provider Order - \"Elderly postop patient requiring nutrition optimization for appropriate healing response\"    NUTRITION HISTORY  Pt admitted after mechanical fall, resulting in left intertrochanteric hip fracture. Now s/p Left femur intramedullary nailing on 4/28.      Pt states PTA (on Saturday 4/27) she was eating normally, typically has 3 meals/day. Has not had any intake since Saturday, until this AM. Denies any recent wt loss or changes in appetite. Pt denies having any food allergies.     CURRENT NUTRITION ORDERS  Diet: Regular  Intake/Tolerance: Pt ate 50% of breakfast today (scrambled eggs and toast).     LABS  Labs reviewed  Phos 2.3 (L) on 4/29    MEDICATIONS  Medications reviewed    ANTHROPOMETRICS  Height: 157.5 cm (5' 2\")  Most Recent Weight: 49.9 kg (110 lb)    IBW: 50 kg  BMI: Normal BMI  Weight History: Wt appears to be trending down in past 5 months, pt has lost 5 lbs (4.5%) in this time.  51.8 kg (114 lbs) on 1/25/19  52.2 kg (115 lbs) on 12/07/18    Dosing Weight: 50 kg    ASSESSED NUTRITION NEEDS  Estimated Energy Needs: 3140-3759+ kcals/day (25 - 30 kcals/kg)  Justification: Maintenance and " Repletion  Estimated Protein Needs: 60-75 grams protein/day (1.2 - 1.5 grams of pro/kg)  Justification: Post-op  Estimated Fluid Needs: 5102-0087 mL/day (1 mL/kcal)   Justification: Maintenance    PHYSICAL FINDINGS  See malnutrition section below.  Bandaged incision on left hip    MALNUTRITION  % Intake: Decreased intake does not meet criteria  % Weight Loss: Weight loss does not meet criteria  Subcutaneous Fat Loss: None   Muscle Loss: Facial & jaw region, Thoracic region (clavicle, acromium bone, deltoid, trapezius, pectoral), Upper arm (bicep, tricep) and Lower arm  (forearm):  Mild - consistent with aging  Fluid Accumulation/Edema: None noted  Malnutrition Diagnosis: Patient does not meet two of the above criteria necessary for diagnosing malnutrition but is at risk for malnutrition    NUTRITION DIAGNOSIS  Increased nutrient needs (protein) related to post-op as evidenced by pt s/p left femur intramedullary nailing on 4/28.     INTERVENTIONS  Implementation  Nutrition education for nutrition relationship to health/disease - Discussed importance of adequate nutritional intake post-op. Discussed increased protein at meal times, and supplement options to help meet needs between meals. Pt agreeable to try Boost Plus.   Medical food supplement therapy - Boost Plus daily  Multivitamin/mineral supplement therapy - Thera-vit-m    Goals  Patient to consume % of nutritionally adequate meal trays TID, or the equivalent with supplements/snacks.     Monitoring/Evaluation  Progress toward goals will be monitored and evaluated per protocol.    Natacha Saucedo, RD, LD  7B and 5A (Beds 6341-1 to 7680-2) pager: [phone]

## 2019-04-29 NOTE — PLAN OF CARE
Discharge Planner PT   Patient plan for discharge: TCU  Current status: PT 7B: Eval complete, treatment provided. Pt required up to MODA x 1 supine>sit,  MODA x 1 and KRISTAN of 2nd person sit>supine and KRISTAN-MODA x 2 sit>stand with walker from bedside. Pt able to stand at most ~20 sec due to deficits in strength, pain. HR peter up to 124 bpm with activity (86 resting). Pt was assisted with post-op L LE ex, required AAROM for hip ROM.   Barriers to return to prior living situation: pt with impaired standing tolerance, requires 2 person assist for bed mobility and transfers, unable to walk.   Recommendations for discharge: TCU  Rationale for recommendations: below baseline functional status, unsafe to return to home setting due to above barriers       Entered by: Romana Ponce 04/29/2019 5:18 PM

## 2019-04-29 NOTE — PROGRESS NOTES
"Vital signs:  Temp: 97.1  F (36.2  C)(oral) Temp src: Oral BP: 123/85 Pulse: 93 Heart Rate: 123(Trauma team aware) Resp: 18 SpO2: 95 % O2 Device: None (Room air) Oxygen Delivery: 2 LPM Height: 157.5 cm (5' 2\") Weight: 49.9 kg (110 lb)  Estimated body mass index is 20.12 kg/m  as calculated from the following:    Height as of this encounter: 1.575 m (5' 2\").    Weight as of this encounter: 49.9 kg (110 lb).        BP soft after 1L LR bolus & 2 U blood infusion. History of replaced aortic valve per trauma team & stents. Transfer to 6B or ICU depending on bed availability.   Labs draw on hold per lab protocol since blood is infusing currently. Call lab after blood infusion completion for Lactic. Lactic 1.6 now.    Emilia Davis RN   "

## 2019-04-29 NOTE — PROGRESS NOTES
04/29/19 1635   Quick Adds   Type of Visit Initial PT Evaluation   Living Environment   Lives With child(clive), adult  (lives with her son in apt)   Living Arrangements apartment   Home Accessibility no concerns   Transportation Anticipated family or friend will provide   Living Environment Comment Pt lives on 17th floor of apt building with adult son. Pt's son works during the day, and pt is alone while son is at work. Elevator available, no stairs within or to enter home. Pt has tub shower without grab bars. Pt does not use shower chair. Toilet does not have grab bars.    Self-Care   Usual Activity Tolerance good   Current Activity Tolerance fair   Regular Exercise No   Equipment Currently Used at Home cane, straight   Activity/Exercise/Self-Care Comment Pt reports prior independence with self cares. Pt reports primarily sponge bathing, however, son is available to assist prn for tub transfer when pt showers/bathes. Pt has fallen twice while donning/doffing shoes in past 6 months.    Functional Level Prior   Ambulation 1-->assistive equipment   Transferring 0-->independent   Toileting 0-->independent   Bathing 0-->independent   Communication 0-->understands/communicates without difficulty   Swallowing 0-->swallows foods/liquids without difficulty   Cognition 0 - no cognition issues reported   Fall history within last six months yes   Number of times patient has fallen within last six months 2  (due to donning/doffing shoes)   Which of the above functional risks had a recent onset or change? ambulation;fall history   Prior Functional Level Comment Ambulated with occasional cane use.    General Information   Onset of Illness/Injury or Date of Surgery - Date 04/27/19   Referring Physician Bassam Newton MD   Patient/Family Goals Statement To recover.    Pertinent History of Current Problem (include personal factors and/or comorbidities that impact the POC) Bernadine Farrell is a 89 year old female with a  left intertrochanteric hip fracture following mechanical fall sustained on 4/27/2019.   Precautions/Limitations fall precautions   General Observations pt [place]   General Info Comments activity: up with assist   Cognitive Status Examination   Level of Consciousness alert   Follows Commands and Answers Questions 100% of the time   Personal Safety and Judgment intact   Pain Assessment   Patient Currently in Pain Yes, see Vital Sign flowsheet   Posture    Posture Comments reduced upright posture   Range of Motion (ROM)   ROM Comment reduced L hip AROM, 15 deg flex, reduced abd noted also <10 deg   Strength   Strength Comments reduced though did not MMT due to post-surgical pain   Bed Mobility   Bed Mobility Comments MODA x 1 supine>sit, MODA x 1 and KRISTAN of 2nd person sit>supine, pt able to assist with UEs and trunk   Transfer Skills   Transfer Comments KRISTAN-MODA x 2 sit>stand with walker from bedside. Pt able to stand at most ~20 sec due to deficits in strength, pain, WBAT L LE   Gait   Gait Comments pt able to take 1 step with FWW, MODA, but unsteady, with reduced L LE WB   Balance   Balance Comments impaired, required assist and walker to stand; in sitting pt able to maintain balance at edge of bed with SBA   Coordination   Coordination Comments intact   General Therapy Interventions   Planned Therapy Interventions balance training;gait training;neuromuscular re-education;strengthening;ROM;stretching;transfer training;home program guidelines;progressive activity/exercise;bed mobility training   Clinical Impression   Criteria for Skilled Therapeutic Intervention yes, treatment indicated   PT Diagnosis impaired mobility   Influenced by the following impairments reduced strength, ROM, balance, increased pain   Functional limitations due to impairments below baseline bed mobility, transfers, gait   Clinical Presentation Evolving/Changing   Clinical Presentation Rationale clinical judgement   Clinical Decision Making  "(Complexity) Moderate complexity   Therapy Frequency` other (see comments)  (6x/wk)   Predicted Duration of Therapy Intervention (days/wks) 1 week   Anticipated Discharge Disposition Transitional Care Facility   Risk & Benefits of therapy have been explained Yes   Patient, Family & other staff in agreement with plan of care Yes   [facility] TM \"6 Clicks\"   2016, Trustees of [facility], under license to [employer].  All rights reserved.   6 Clicks Short Forms Basic Mobility Inpatient Short Form   [facility]  \"6 Clicks\" V.2 Basic Mobility Inpatient Short Form   1. Turning from your back to your side while in a flat bed without using bedrails? 2 - A Lot   2. Moving from lying on your back to sitting on the side of a flat bed without using bedrails? 2 - A Lot   3. Moving to and from a bed to a chair (including a wheelchair)? 1 - Total   4. Standing up from a chair using your arms (e.g., wheelchair, or bedside chair)? 2 - A Lot   5. To walk in hospital room? 1 - Total   6. Climbing 3-5 steps with a railing? 1 - Total   Basic Mobility Raw Score (Score out of 24.Lower scores equate to lower levels of function) 9   Total Evaluation Time   Total Evaluation Time (Minutes) 8     "

## 2019-04-30 ENCOUNTER — APPOINTMENT (OUTPATIENT)
Dept: PHYSICAL THERAPY | Facility: CLINIC | Age: 84
DRG: 480 | End: 2019-04-30
Payer: MEDICARE

## 2019-04-30 ENCOUNTER — APPOINTMENT (OUTPATIENT)
Dept: OCCUPATIONAL THERAPY | Facility: CLINIC | Age: 84
DRG: 480 | End: 2019-04-30
Payer: MEDICARE

## 2019-04-30 LAB
ANION GAP SERPL CALCULATED.3IONS-SCNC: 8 MMOL/L (ref 3–14)
BACTERIA SPEC CULT: ABNORMAL
BACTERIA SPEC CULT: ABNORMAL
BUN SERPL-MCNC: 8 MG/DL (ref 7–30)
CA-I SERPL ISE-MCNC: 4.4 MG/DL (ref 4.4–5.2)
CALCIUM SERPL-MCNC: 8 MG/DL (ref 8.5–10.1)
CHLORIDE SERPL-SCNC: 106 MMOL/L (ref 94–109)
CO2 SERPL-SCNC: 26 MMOL/L (ref 20–32)
CREAT SERPL-MCNC: 0.6 MG/DL (ref 0.52–1.04)
DEPRECATED CALCIDIOL+CALCIFEROL SERPL-MC: 12 UG/L (ref 20–75)
ERYTHROCYTE [DISTWIDTH] IN BLOOD BY AUTOMATED COUNT: 13.9 % (ref 10–15)
GFR SERPL CREATININE-BSD FRML MDRD: 80 ML/MIN/{1.73_M2}
GLUCOSE SERPL-MCNC: 122 MG/DL (ref 70–99)
HCT VFR BLD AUTO: 34.6 % (ref 35–47)
HGB BLD-MCNC: 11.1 G/DL (ref 11.7–15.7)
HGB BLD-MCNC: 11.4 G/DL (ref 11.7–15.7)
HGB BLD-MCNC: 11.7 G/DL (ref 11.7–15.7)
INTERPRETATION ECG - MUSE: NORMAL
LACTATE BLD-SCNC: 1.3 MMOL/L (ref 0.7–2)
LACTATE BLD-SCNC: 1.7 MMOL/L (ref 0.7–2)
Lab: ABNORMAL
MAGNESIUM SERPL-MCNC: 1.8 MG/DL (ref 1.6–2.3)
MCH RBC QN AUTO: 29.6 PG (ref 26.5–33)
MCHC RBC AUTO-ENTMCNC: 32.9 G/DL (ref 31.5–36.5)
MCV RBC AUTO: 90 FL (ref 78–100)
PLATELET # BLD AUTO: 100 10E9/L (ref 150–450)
POTASSIUM SERPL-SCNC: 3.6 MMOL/L (ref 3.4–5.3)
RBC # BLD AUTO: 3.85 10E12/L (ref 3.8–5.2)
SODIUM SERPL-SCNC: 140 MMOL/L (ref 133–144)
SPECIMEN SOURCE: ABNORMAL
WBC # BLD AUTO: 10.2 10E9/L (ref 4–11)

## 2019-04-30 PROCEDURE — A9270 NON-COVERED ITEM OR SERVICE: HCPCS | Performed by: SURGERY

## 2019-04-30 PROCEDURE — 25000125 ZZHC RX 250: Performed by: NURSE PRACTITIONER

## 2019-04-30 PROCEDURE — 97110 THERAPEUTIC EXERCISES: CPT | Mod: GP

## 2019-04-30 PROCEDURE — 25000132 ZZH RX MED GY IP 250 OP 250 PS 637: Performed by: NURSE PRACTITIONER

## 2019-04-30 PROCEDURE — 80048 BASIC METABOLIC PNL TOTAL CA: CPT | Performed by: NURSE PRACTITIONER

## 2019-04-30 PROCEDURE — 83735 ASSAY OF MAGNESIUM: CPT | Performed by: NURSE PRACTITIONER

## 2019-04-30 PROCEDURE — 83605 ASSAY OF LACTIC ACID: CPT | Performed by: NURSE PRACTITIONER

## 2019-04-30 PROCEDURE — 36415 COLL VENOUS BLD VENIPUNCTURE: CPT | Performed by: NURSE PRACTITIONER

## 2019-04-30 PROCEDURE — A9270 NON-COVERED ITEM OR SERVICE: HCPCS | Performed by: NURSE PRACTITIONER

## 2019-04-30 PROCEDURE — 25800030 ZZH RX IP 258 OP 636: Performed by: NURSE PRACTITIONER

## 2019-04-30 PROCEDURE — 85018 HEMOGLOBIN: CPT | Performed by: STUDENT IN AN ORGANIZED HEALTH CARE EDUCATION/TRAINING PROGRAM

## 2019-04-30 PROCEDURE — 93010 ELECTROCARDIOGRAM REPORT: CPT | Performed by: INTERNAL MEDICINE

## 2019-04-30 PROCEDURE — 93005 ELECTROCARDIOGRAM TRACING: CPT

## 2019-04-30 PROCEDURE — 97530 THERAPEUTIC ACTIVITIES: CPT | Mod: GO | Performed by: OCCUPATIONAL THERAPIST

## 2019-04-30 PROCEDURE — 82330 ASSAY OF CALCIUM: CPT | Performed by: NURSE PRACTITIONER

## 2019-04-30 PROCEDURE — 25000125 ZZHC RX 250: Performed by: SURGERY

## 2019-04-30 PROCEDURE — 25000132 ZZH RX MED GY IP 250 OP 250 PS 637: Performed by: STUDENT IN AN ORGANIZED HEALTH CARE EDUCATION/TRAINING PROGRAM

## 2019-04-30 PROCEDURE — 25000132 ZZH RX MED GY IP 250 OP 250 PS 637: Performed by: SURGERY

## 2019-04-30 PROCEDURE — 36415 COLL VENOUS BLD VENIPUNCTURE: CPT | Performed by: STUDENT IN AN ORGANIZED HEALTH CARE EDUCATION/TRAINING PROGRAM

## 2019-04-30 PROCEDURE — 12000004 ZZH R&B IMCU UMMC

## 2019-04-30 PROCEDURE — 82306 VITAMIN D 25 HYDROXY: CPT | Performed by: NURSE PRACTITIONER

## 2019-04-30 PROCEDURE — 97530 THERAPEUTIC ACTIVITIES: CPT | Mod: GP

## 2019-04-30 PROCEDURE — 85018 HEMOGLOBIN: CPT | Performed by: NURSE PRACTITIONER

## 2019-04-30 PROCEDURE — 99232 SBSQ HOSP IP/OBS MODERATE 35: CPT | Performed by: NURSE PRACTITIONER

## 2019-04-30 PROCEDURE — 25000128 H RX IP 250 OP 636: Performed by: STUDENT IN AN ORGANIZED HEALTH CARE EDUCATION/TRAINING PROGRAM

## 2019-04-30 PROCEDURE — 85027 COMPLETE CBC AUTOMATED: CPT | Performed by: NURSE PRACTITIONER

## 2019-04-30 RX ORDER — CLOPIDOGREL BISULFATE 75 MG/1
75 TABLET ORAL DAILY
Status: DISCONTINUED | OUTPATIENT
Start: 2019-05-01 | End: 2019-05-02 | Stop reason: HOSPADM

## 2019-04-30 RX ORDER — MULTIPLE VITAMINS W/ MINERALS TAB 9MG-400MCG
1 TAB ORAL DAILY
DISCHARGE
Start: 2019-05-01

## 2019-04-30 RX ORDER — POLYETHYLENE GLYCOL 3350 17 G/17G
17 POWDER, FOR SOLUTION ORAL DAILY PRN
DISCHARGE
Start: 2019-04-30

## 2019-04-30 RX ORDER — CALCIUM CARBONATE 500(1250)
500 TABLET ORAL 2 TIMES DAILY WITH MEALS
DISCHARGE
Start: 2019-04-30

## 2019-04-30 RX ORDER — METOPROLOL TARTRATE 1 MG/ML
5 INJECTION, SOLUTION INTRAVENOUS ONCE
Status: COMPLETED | OUTPATIENT
Start: 2019-04-30 | End: 2019-04-30

## 2019-04-30 RX ORDER — LIDOCAINE 4 G/G
1 PATCH TOPICAL EVERY 24 HOURS
Status: ON HOLD | DISCHARGE
Start: 2019-04-30 | End: 2019-05-18

## 2019-04-30 RX ORDER — AMOXICILLIN 250 MG
1-2 CAPSULE ORAL 2 TIMES DAILY
DISCHARGE
Start: 2019-04-30

## 2019-04-30 RX ORDER — ACETAMINOPHEN 500 MG
1000 TABLET ORAL EVERY 8 HOURS PRN
DISCHARGE
Start: 2019-04-30

## 2019-04-30 RX ORDER — ASPIRIN 81 MG/1
81 TABLET ORAL DAILY
Status: DISCONTINUED | OUTPATIENT
Start: 2019-04-30 | End: 2019-05-02 | Stop reason: HOSPADM

## 2019-04-30 RX ADMIN — CALCIUM 500 MG: 500 TABLET ORAL at 09:08

## 2019-04-30 RX ADMIN — Medication 2 G: at 10:47

## 2019-04-30 RX ADMIN — METOPROLOL TARTRATE 5 MG: 5 INJECTION, SOLUTION INTRAVENOUS at 03:22

## 2019-04-30 RX ADMIN — Medication 2.5 MG: at 15:26

## 2019-04-30 RX ADMIN — CALCIUM 500 MG: 500 TABLET ORAL at 18:00

## 2019-04-30 RX ADMIN — Medication 12.5 MG: at 09:08

## 2019-04-30 RX ADMIN — ATORVASTATIN CALCIUM 10 MG: 10 TABLET, FILM COATED ORAL at 20:26

## 2019-04-30 RX ADMIN — LIDOCAINE 1 PATCH: 560 PATCH PERCUTANEOUS; TOPICAL; TRANSDERMAL at 09:12

## 2019-04-30 RX ADMIN — CEFTRIAXONE SODIUM 1 G: 1 INJECTION, POWDER, FOR SOLUTION INTRAMUSCULAR; INTRAVENOUS at 05:35

## 2019-04-30 RX ADMIN — Medication 2.5 MG: at 11:22

## 2019-04-30 RX ADMIN — MULTIPLE VITAMINS W/ MINERALS TAB 1 TABLET: TAB at 11:22

## 2019-04-30 RX ADMIN — LEVOTHYROXINE SODIUM 50 MCG: 50 TABLET ORAL at 09:08

## 2019-04-30 RX ADMIN — ACETAMINOPHEN 1000 MG: 500 TABLET, FILM COATED ORAL at 03:22

## 2019-04-30 RX ADMIN — Medication 12.5 MG: at 20:26

## 2019-04-30 RX ADMIN — VITAMIN D, TAB 1000IU (100/BT) 1000 UNITS: 25 TAB at 09:08

## 2019-04-30 RX ADMIN — ASPIRIN 81 MG: 81 TABLET, COATED ORAL at 15:26

## 2019-04-30 RX ADMIN — SODIUM CHLORIDE, POTASSIUM CHLORIDE, SODIUM LACTATE AND CALCIUM CHLORIDE: 600; 310; 30; 20 INJECTION, SOLUTION INTRAVENOUS at 05:35

## 2019-04-30 RX ADMIN — ACETAMINOPHEN 1000 MG: 500 TABLET, FILM COATED ORAL at 10:51

## 2019-04-30 RX ADMIN — ACETAMINOPHEN 1000 MG: 500 TABLET, FILM COATED ORAL at 20:26

## 2019-04-30 RX ADMIN — SENNOSIDES AND DOCUSATE SODIUM 2 TABLET: 8.6; 5 TABLET ORAL at 20:27

## 2019-04-30 ASSESSMENT — ACTIVITIES OF DAILY LIVING (ADL)
ADLS_ACUITY_SCORE: 17

## 2019-04-30 ASSESSMENT — PAIN DESCRIPTION - DESCRIPTORS
DESCRIPTORS: ACHING
DESCRIPTORS: ACHING;SORE

## 2019-04-30 NOTE — PROGRESS NOTES
[facility], [place]  Trauma Service Progress Note    Date of Service (when I saw the patient): 04/30/2019     Assessment & Plan   Trauma Mechanism: Ground level fall  Date and time of injury: 04/27/2019  About 1730  Known Injuries:  1. Left intertrochanteric fracture     Other diagnoses:  1. Acute traumatic pain  2. Acute blood loss anemia  3. UTI   4. sepsis  5. Hypertension  6. CAD stents on dual antiplatelet therapy, ASA and Plavix  7. Hx TAVR 2017- Aortic stenosis  8. Hx pAfib  9. Hypothyroidism      Procedure(s):  04/28/2019 S/P Intermedullary nailing - Dr. Das  Plan:  1. Tertiary completed today, April 29, 2019 with no other obvious injuries noted. Transferred to intermediate care unit yesterday given lower blood pressures, tachycardia--> concern for urosepsis. Lactic was elevated @ 3.1, received a 500ml bolus--> recheck ordered stat and pending. Hypertensive 170/90's with tachycardia, 120's overnight, one time dose of IV Metoprolol given, EKG-Sinus tach, unchanged LBBB. Improved BP's this morning, 's. Awaiting BMP results and lactic.  1. Femur fracture: POD #2 S/P left femur IMN  1. Activity status changed to toe touch weight bearing to the left lower extremity due to pain.  2. PT/OT  3. Dressing changed 04/30 by Ortho, scant shadow drainage noted. Compartments soft, +CMS  4. Calcium and Vit D supplements  5. Elevated LLE, ice to the left knee to help with swelling and pain  6. Discussed with staff, plan to resume Aspirin today and Plavix tomorrow., CBC tomorrow am.  7. [facility]. Das in 2 weeks for wound check and evaluation in clinic   2. Resp: No acute issues, Pulmonary toilet  3. Neuro/ Pain: Declined head CT on admission. Stable neuro exam  1. Continue routine neurochecks  2. Acute pain: Complains mainly of left knee pain, continue scheduled Tylenol, Lidoderm patches, PRN Oxycodone for severe pain. Ice to left knee and  elevate  3. Delirium prevention measures: high risk given advanced age, recent trauma and hospitalization, narcotic medications and pain. Early mobility post op as able, awake during the day as much as able, allow for night time rest, pain control  4. Cardiac: With noted hypotension POD#1 requiring PRBC and crystalloid with noted improvements today. ECHO obtained 04/29 with stable EF 55-60%, normal bioprosthetic aortic valve doppler function. Net positive >4700 since admission, UOP 750ml x 8 hours. She reports feeling her heart beat fast with activity, denies shortness of breath and chest pain, denies lightheadedness while up in the chair.   1. Hx Hypertension: Plan to resume home BB at half the dose and monitor BP and HR closely. Continue to hold home Amlodipine, Enalapril, Imdur, resume as able.  2. BMP pending  3. Hold ASA and Plavix for now given concern for ongoing  5. Heme  1. Acute blood loss anemia: Hemoglobin dropped from 12-->7.7 post op. Improved after 2units pRBC to 11. Left thigh remains swollen and soft, distal pulses palpable +1, LLE warm to touch, dressing CDI. Documented EBL 100ml. Blood loss is anticipated with long large bone fracture, expect ongoing oozing post op, on Plavix and ASA PTA some dilutional component with IVF. Hold off on further transfusions for now.  2. Transfuse as needed for Hbg <7  6. FEN: Electrolytes WNL,  7. GI:  Regular diet  8. : UA/UC 10-50K colonies/mL of proteus mirabillis pan sensitive, some resistance to nitrofurantoin.   1. Sepsis: +UA/UC, lactic 3.1, hypotension, tachycardia. Continue IV Ceftriaxone for now Can transition to PO Cipro at discharge.  2. Strict I/O  9. ID: Afebrile, normal white count, Ceftriaxone as above for UTI  10. Endocrine:   1. Hypothyroidism: Resume home Levothyroxine  2. Blood glucose WNL, monitor   11. Palliative care consult: routine geriatric trauma given advanced age, femur fracture.  12. PT/OT consults  13. Social work- discharge  planning     General Cares:               PPI/H2 blocker: N/A              DVT prophylaxis: Mechanical preop              Bowel Regimen/Date of last stool: 04/30- ordered              Pulmonary toilet: IS, cough and deep breath              Lines / drains: PIV  Discharge goals:     Adequate pain management: ongoing    VSS x24 hours: ongoing    Hemoglobin stable x 48 hours: No    Ambulating safely and/or therapy evals complete: yes    Drains/lines removed or plan in place to manage: yes    Teaching done: ongoing    Other:  Expected D/C date: Wed vs Thur, given stable hemoglobin, pain control  Interval History   Lactic 1.4 to 3.1 fluid bolus given last night, tachycardia, hypertensive. States she feels less exhausted today, feels her heart beat fast with activity. Denies chest pain, heaviness, SOB, abdominal pain or nausea.  ROS x 8 negative with exception of those things listed in interval hx    Physical Exam   Temp: 98.6  F (37  C) Temp src: Oral BP: 145/60 Pulse: 93 Heart Rate: 108 Resp: 18 SpO2: 100 % O2 Device: None (Room air)    Vitals:    04/27/19 1919   Weight: 49.9 kg (110 lb)     Vital Signs with Ranges  Temp:  [96  F (35.6  C)-99.7  F (37.6  C)] 98.6  F (37  C)  Pulse:  [93-96] 93  Heart Rate:  [] 108  Resp:  [16-19] 18  BP: ()/(48-92) 145/60  SpO2:  [95 %-100 %] 100 %  I/O last 3 completed shifts:  In: 3628.75 [P.O.:1140; I.V.:1888.75]  Out: 1350 [Urine:1350]    Sowmya Coma Scale - Total 14/15  Eye Response (E): 4   4= spontaneous, 3= to verbal/voice, 2= to pain, 1= No response   Verbal Response (V): 4 confused to location   5= Orientated, converses, 4= Confused, converses, 3= Inappropriate words, 2= Incomprehensible sounds, 1=No response   Motor Response (M): 6   6= Obeys commands, 5= Localizes to pain, 4= Withdrawal to pain, 3=Fexion to pain, 2= Extension to pain, 1= No response   Constitutional: Awake, alert, cooperative, no apparent distress.  Eyes: Lids and lashes normal, pupils equal,  round and reactive to light  ENT: Normocephalic, atraumatic  Respiratory: No increased work of breathing, good air exchange, clear to auscultation bilaterally, no crackles or wheezing.  Cardiovascular: tachy, regular, normal S1 and S2  GI: Normal bowel sounds, abdomen soft, non-distended, non-tender, no guarding  Genitourinary:  Voids spontaneously, harrison per pure wick output  Skin:  Normal skin color, no redness, warmth, or swelling, no ecchymosis, left thigh incision covered, dressing with scant shadow draiange  Musculoskeletal: Tenderness with palpation of the left distal femur/knee, thigh is soft, no ecchymosis, +1 Pedal pulse palpated, warm to touch  Neurologic: Awake, alert, oriented. Strength and sensory is intact. No focal deficits.  Neuropsychiatric: Calm, normal eye contact, alert, affect appropriate to situation, oriented, thought process normal.    LV Jones CNP  To contact the trauma service use job code pager [phone],   Numeric texts or alpha text through [facility]

## 2019-04-30 NOTE — DISCHARGE SUMMARY
[facility], [place]    Discharge Summary  Trauma Surgery Service    Date of Admission:  4/27/2019  Date of Discharge:  5/2/2019  Discharging Provider: Edmund Marques NPFanC  Date of Service (when I saw the patient): 05/02/19    Primary Provider: Dewayne Goyal  Primary Care clinic: [facility] [address] [facility] [address]  Phone: [phone]  Fax number: [phone]     Discharge Diagnoses   Known Injuries, fall 04/27/2019  1. Left intertrochanteric fracture     Other diagnoses:  1. Acute traumatic pain  2. Acute blood loss anemia  3. UTI with urosepsis  4. Hypertension  5. CAD stents on dual antiplatelet therapy, ASA and Plavix  6. Hx TAVR 2017- Aortic stenosis  7. Hx pAfib  8. Hypothyroidism    Hospital Course   Bernadine Farrell is a 89 year old female who presents with left hip pain after a fall from standing height this evening. She tripped over her foot while taking off her shoes and fell to the ground on her left side. She did not lose consciousness and may have hit her head on an adjacent wall, but she denies headache and did not feel the head was struck hard. She endorses pain in the left hip, otherwise no pain anywhere else. The patient is declined a CT scan of the head on admission.  Her hospital course and plan below:    Fall  The patient sustained the above injury as a result of fall.  She was seen by the trauma nurse practitioner and injury prevention education was performed.  The mechanism of injury and factors contributing to the accident were discussed with the patient.  Strategies on how to prevent future accidents were reviewed.  The patient underwent tertiary examination to evaluate for additional injuries.  The systematic review did not find any other injuries.    Left femur fracture  Bernadine Farrell was evaluated by Orthopedics and underwent a left intermedullary nailing of her left femur on 04/28/2019 by Dr. Das. Please  see operative note by Dr Das for details regarding the procedure. She will need Plavix and Aspirin which she was getting for her TAVR prior to admission. She is recommended to Touchdown weight bearing (10 - 25 pounds) given concern for distal crystal erosion and is requested to follow up in the Orthopedic Clinic in 2 weeks with Dr. Das for a wound check. Mobility progression to be determined at her follow up visit. She  was evaluated by physical and occupational therapies during her hospitalization.  Please see summary of most current therapy notes below.     Acute traumatic pain  Pain was controlled with a multi-modality approach.  The current regimen for Bernadine Farrell includes the following, scheduled acetaminophen, topical analgesic and PRN short acting opioids.  Adequate pain control was achieved with this regimen.     Urinary Tract Infection  Bernadine Farrell was found to have UTI on routine urine analysis during this admission.  A urine culture was done and grew 10 to 50K colonies/ml of proteus mirabillis pan sensitive but for nitrofurantoin.   She was hypotensive post op and her lactic was elevated at 3.1. Improved with fluid bolus and initiation of antibiotics for treatment f her UTI.   Patient requires  days of 3 to complete their course.    Acute blood loss anemia  Bernadine Farrell was admitted with a hemoglobin of 12.0 . The blood loss was affected by her anticoagulation status on admission and with her type of injury.Our protocol is to keep hemoglobin >7.0 g/dL. Her home antiplatelet regimen was held in the immediate post operative period. Her hemoglobin was monitored closely and her regimen resumed prior to discharge. Over the course of her hospitalization. She has received 2 units of PRBC. The most recent hemoglobin is 10.9.  There are no signs of symptoms of ongoing blood loss at the time of discharge. However would recommend rechecking her hemoglobin in 72 hours with  resumption of dual antiplatelet therapy.     History of hypertension  History of severe aortic valve stenosis S/P TAVR 2017  Hyperlipidemia    On admission she was noted to be hypertensive, with noted hypotension POD#1 requiring PRBC and crystalloid with improvement. An ECHO was obtained 04/29 with stable EF 55-60%, normal bioprosthetic aortic valve doppler function. A 12 lead EKG was obtained pre and post op with no ST segment or T wave changes, unchanged LLB. Her home betablocker was resumed at half her home dose and her Amlodipine, Enalapril, Imdur were held.   Her home regimen can slowly be resumed as her blood pressure and heart rate tolerate. Prior to discharge her blood pressure and heart rate were stable on PTA metoprolol and amlodipine     Hypothyroidism: Continue home Levothyroxine. Blood glucose levels stable.  Palliative Care Consult  The palliative care team was consulted to assist in the care and future life planning regarding the changes the above injuries have created. Often this sort of injury is a clear marker of general decline in the aged population.  During their time with the patient and family, these are the things they focused on this admission:  discussion of individual needs of patient with new traumatic injuries and life style changes  and discussion of goals of care:   Therapy Recommendations:   Current status of physical therapies on discharge: TCU/ARU     Current status of occupational therapies on discharge:TCU/ARU     Significant Results and Procedures   DATE: 04/28/2019  Procedure(s):  OPEN REDUCTION INTERNAL FIXATION, FRACTURE, FEMUR, USING INTRAMEDULLARY JESSE  Surgeon(s): Surgeon(s) and Role:     * Kofi Das MD - Primary     * Bassam Newton MD - Resident - Assisting  Non-operative procedures None performed    Code Status   Full Code    SUBJECTIVE: Review Of Systems  Skin: bruising  Eyes: negative  Ears/Nose/Throat: negative  Respiratory: No shortness of breath,  dyspnea on exertion, cough, or hemoptysis  Cardiovascular: negative  Gastrointestinal: negative  Genitourinary: negative  Musculoskeletal: joint pain, joint swelling and joint stiffness  Neurologic: negative  Psychiatric: negative  Hematologic/Lymphatic/Immunologic: negative  Endocrine: negative     Physical Exam   Temp: 98.8  F (37.1  C) Temp src: Oral BP: 128/76 Pulse: 77 Heart Rate: 89 Resp: 18 SpO2: 96 % O2 Device: Nasal cannula Oxygen Delivery: 2 LPM  Vitals:    04/27/19 1919 05/01/19 0448   Weight: 49.9 kg (110 lb) 54.4 kg (119 lb 14.9 oz)     Vital Signs with Ranges  Temp:  [97.6  F (36.4  C)-98.9  F (37.2  C)] 98.8  F (37.1  C)  Pulse:  [77-83] 77  Heart Rate:  [] 89  Resp:  [16-18] 18  BP: (112-187)/(53-94) 128/76  SpO2:  [94 %-99 %] 96 %  I/O last 3 completed shifts:  In: 795 [P.O.:370; I.V.:425]  Out: 1000 [Urine:1000]    Sowmya Coma Scale - Total 14/15  Eye Response (E): 4   4= spontaneous, 3= to verbal/voice, 2= to pain, 1= No response   Verbal Response (V): 4 confused to location   5= Orientated, converses, 4= Confused, converses, 3= Inappropriate words, 2= Incomprehensible sounds, 1=No response   Motor Response (M): 6   6= Obeys commands, 5= Localizes to pain, 4= Withdrawal to pain, 3=Fexion to pain, 2= Extension to pain, 1= No response   Constitutional: Awake, alert, cooperative, no apparent distress.  Eyes: Lids and lashes normal, pupils equal, round and reactive to light  ENT: Normocephalic, atraumatic  Respiratory: No increased work of breathing, good air exchange, clear to auscultation bilaterally, no crackles or wheezing.  Cardiovascular: SR, regular, normal S1 and S2  GI: Normal bowel sounds, abdomen soft, non-distended, non-tender, no guarding  Genitourinary:  Voids spontaneously, harrison per pure wick output  Skin:  Normal skin color, no redness, warmth, or swelling, no ecchymosis, left thigh incision covered, dressing with scant shadow draiange  Musculoskeletal: Tenderness with  "palpation of the left distal femur/knee, thigh is soft, no ecchymosis, +2 Pedal pulse palpated, warm to touch  Neurologic: Awake, alert, oriented. Strength and sensory is intact. No focal deficits.  Neuropsychiatric: Calm, normal eye contact, alert, affect appropriate to situation, oriented, thought process normal.        ---    Discharge Disposition   Discharged to rehabilitation facility  Condition at discharge: Stable  Discharge VS: Blood pressure 141/53, pulse 93, temperature 98.6  F (37  C), temperature source Oral, resp. rate 16, height 1.575 m (5' 2\"), weight 49.9 kg (110 lb), SpO2 94 %.    Consultations This Hospital Stay   PALLIATIVE CARE ADULT IP CONSULT  SOCIAL WORK IP CONSULT  PHYSICAL THERAPY ADULT IP CONSULT  NUTRITION SERVICES ADULT IP CONSULT  PHYSICAL THERAPY ADULT IP CONSULT  OCCUPATIONAL THERAPY ADULT IP CONSULT    Discharge Orders      General info for SNF    Length of Stay Estimate: Short Term Care: Estimated # of Days <30  Condition at Discharge: Stable  Level of care:skilled   Rehabilitation Potential: Good  Admission H&P remains valid and up-to-date: Yes  Recent Chemotherapy: N/A  Use Nursing Home Standing Orders: Yes     Mantoux instructions    Give two-step Mantoux (PPD) Per Facility Policy Yes     Reason for your hospital stay    Fall  Left intertrochanteric femur fracture  S/P intermedullary nailing of the left femur     Intake and output    Every shift     Wound care (specify)    Site:   Left hip and lateral knee surgical insicions  Instructions:  Dressing change daily with dry gauze and tape     Follow Up and recommended labs and tests    Follow up with your primary care provider for continued medical care and hospital follow up in 5-10 days.     Medication Therapy Management Services  If you have any questions regarding your medications after discharge, this service is available to you.  Please call:  [phone] or [phone] (toll-free)  [facility]  [address] [place] [address]  [email or web address]    Orthopaedic Clinic: with Dr. Das in 2 weeks for wound check and evaluation in clinic  [facility]  [address]   [address] [place] [address]   Appointments: [phone]     You have been involved in a recent trauma incident resulting in an injury.  Studies show us that people affected by trauma have higher levels of post-traumatic stress disorder (PTSD) and/or depressive symptoms during the year following an injury.     Please consider the following.  Have you:  Had migraines about the event(s) or thought about the event(s) when you didn't want to?  Tried hard not to think about the event(s) or went out of your way to avoid situations that reminded you of the event(s)?  Been constantly on guard, watchful, or easily startled?  Felt numb or detached from people, activities, or your surroundings?   Felt guilty or unable to stop blaming yourself or others for the event(s) or any problems the event (s) may have caused?    If you answered \"yes\" to 3 or more of these questions, or if you simply want to discuss any of your feelings further, we recommend that you talk with your Primary Care Provider or a mental health professional.     Additional Discharge Instructions    Recheck a complete blood count in 72 hours after discharge     Activity - Up with nursing assistance     Weight bearing status    Toe touch weight bearing to left lower extremity   OK for left knee ROM     Full Code     Physical Therapy Adult Consult    Evaluate and treat as clinically indicated.    Reason:  Fall, left intertrochanteric femur fracture  S/P intermedullary nailing     Occupational Therapy Adult Consult    Evaluate and treat as clinically indicated.    Reason:  Fall, left intertrochanteric femur fracture     Fall precautions     Advance Diet as Tolerated    Follow this diet upon discharge: Regular     Discharge Medications   Current " Discharge Medication List      START taking these medications    Details   acetaminophen (TYLENOL) 500 MG tablet Take 2 tablets (1,000 mg) by mouth every 8 hours as needed for mild pain or fever    Associated Diagnoses: Closed intertrochanteric fracture of hip, left, initial encounter (H)      calcium carbonate 500 mg, elemental, (OSCAL;OYSTER SHELL CALCIUM) 500 MG tablet Take 1 tablet (500 mg) by mouth 2 times daily (with meals)    Associated Diagnoses: Closed intertrochanteric fracture of hip, left, initial encounter (H)      Lidocaine (LIDOCARE) 4 % Patch Place 1 patch onto the skin every 24 hours    Associated Diagnoses: Closed intertrochanteric fracture of hip, left, initial encounter (H)      multivitamin w/minerals (THERA-VIT-M) tablet Take 1 tablet by mouth daily    Associated Diagnoses: Closed intertrochanteric fracture of hip, left, initial encounter (H)      polyethylene glycol (MIRALAX/GLYCOLAX) packet Take 17 g by mouth daily as needed for constipation    Associated Diagnoses: Closed intertrochanteric fracture of hip, left, initial encounter (H)      senna-docusate (SENOKOT-S/PERICOLACE) 8.6-50 MG tablet Take 1-2 tablets by mouth 2 times daily    Associated Diagnoses: Closed intertrochanteric fracture of hip, left, initial encounter (H)      vitamin D3 1000 units TABS Take 1,000 Units by mouth daily    Associated Diagnoses: Closed intertrochanteric fracture of hip, left, initial encounter (H)         CONTINUE these medications which have NOT CHANGED    Details   amLODIPine (NORVASC) 2.5 MG tablet Take 2.5 mg by mouth daily       atorvastatin (LIPITOR) 10 MG tablet Take 10 mg by mouth daily       clopidogrel (PLAVIX) 75 MG tablet Take 75 mg by mouth daily       enalapril (VASOTEC) 2.5 MG tablet Take 2.5 mg by mouth daily       isosorbide mononitrate (IMDUR) 60 MG 24 hr tablet Take 60 mg by mouth daily       levothyroxine (SYNTHROID/LEVOTHROID) 50 MCG tablet Take 50 mcg by mouth daily       metoprolol  tartrate (LOPRESSOR) 50 MG tablet Take 25 mg by mouth 2 times daily       aspirin (ASA) 81 MG chewable tablet Take 162 mg by mouth daily            Allergies   No Known Allergies  Data   Most Recent 3 CBC's:  Recent Labs   Lab Test 04/30/19  1152 04/30/19  0500 04/30/19  0415  04/29/19  1012 04/29/19  0345   WBC  --   --  10.2  --  8.3 7.8   HGB 11.7 11.1* 11.4*   < > 7.9* 7.7*   MCV  --   --  90  --  96 96   PLT  --   --  100*  --  104* 102*    < > = values in this interval not displayed.      Most Recent 3 BMP's:  Recent Labs   Lab Test 04/30/19  0415 04/29/19  0345 04/28/19  1710 04/27/19  2133    135  --  137   POTASSIUM 3.6 3.6  --  3.4   CHLORIDE 106 105  --  104   CO2 26 22  --  23   BUN 8 14  --  15   CR 0.60 0.75 0.72 0.73   ANIONGAP 8 8  --  10   SHANNAN 8.0* 7.7*  --  8.6   * 114*  --  147*     Most Recent 2 LFT's:No lab results found.  Most Recent INR's and Anticoagulation Dosing History:  Anticoagulation Dose History     Recent Dosing and Labs Latest Ref Rng & Units 4/27/2019    INR 0.86 - 1.14 1.11        Most Recent 3 Troponin's:No lab results found.  Most Recent 6 Bacteria Isolates From Any Culture (See EPIC Reports for Culture Details):  Recent Labs   Lab Test 04/28/19  1754   CULT 10,000 to 50,000 colonies/mL  Proteus mirabilis  *  <1000 colonies/mL  urogenital kassi       Most Recent TSH, T4 and A1c Labs:No lab results found.  Results for orders placed or performed during the hospital encounter of 04/27/19   XR Pelvis and Hip Left 2 Views     Value    Radiologist flags Left hip fracture (Urgent)    Narrative    Exam: XR PELVIS AND HIP LEFT 2 VIEWS, 4/27/2019 8:51 PM    Indication: Fall, hip pain    Comparison: None    Findings:   PA view of the pelvis and lateral view of the left hip. There is an  intertrochanteric fracture the left femur. Degenerative changes of the  right hip. Degenerative changes of the visualized lower lumbar spine.  Pelvic phleboliths. Vascular calcifications. Soft  tissues are  unremarkable.      Impression    Impression:   Intertrochanteric fracture of the left femur.    [Urgent Result: Left hip fracture]    Finding was identified on 4/27/2019 9:01 PM.     Dr. Womack was contacted by Dr. Salazar at 4/27/2019 9:02 PM and  verbalized understanding of the urgent finding.     I have personally reviewed the examination and initial interpretation  and I agree with the findings.    SANDEE COTE MD   XR Knee Left 3 Views    Narrative    Exam: XR KNEE LT 3 VW, 4/27/2019 8:50 PM    Indication: Fall, knee pain    Comparison: None    Findings:   AP and lateral views of the left knee. Tricompartmental degenerative  changes of the knee. No acute fracture. Moderate joint effusion.  Vascular calcifications. Soft tissues are unremarkable.      Impression    Impression:   1. No acute fracture.  2. Tricompartmental degenerative changes of the knee.   3. Moderate joint effusion.    I have personally reviewed the examination and initial interpretation  and I agree with the findings.    SANDEE COTE MD   XR Surgery JULES Fluoro L/T 5 Min w Stills    Narrative    This exam was marked as non-reportable because it will not be read by a   radiologist or a Luck non-radiologist provider.             XR Femur Port Left 2 Views    Narrative    2 views left femur radiographs 4/28/2019 12:23 PM    History: Portable imaging in PACU s/p Left IMN, please include hip and  femur    Comparison: 4/27/2019    Findings:    AP and cross table lateral views of the left femur were obtained. Hip  joint including top of femoral head not fully included in the AP view  and underpenetrated on lateral view.    Postsurgical changes of left femur cephalomedullary construct with  single distal interlocking screw for intertrochanteric fracture.  Intramedullary crystal is anteriorly oriented distally and extends beyond  the anterior cortical margin with intra-articular bony fragments and  associated lipohemarthrosis.      Subcutaneous gas..      Hip and knee are congruent on these non-dedicated views. Degenerative  changes of knee.    Vascular calcifications. Bones appear osteopenic. Surgical staples in  the lateral distal thigh.      Impression    Impression: New left femur intertrochanteric fracture fixation with  intramedullary crystal distally extending beyond anterior cortical margin  with associated lipohemarthrosis and bony fragments.     I have personally reviewed the examination and initial interpretation  and I agree with the findings.    MALVIN CHRISTIANAHASHI   CT Femur Thigh Left w/o Contrast    Narrative    CT FEMUR THIGH LEFT WITHOUT CONTRAST  4/28/2019 3:30 PM     HISTORY: s/p IMN, want to assess for postop fracture    COMPARISON: Plain radiographs 4/27/2019, 4/28/2019    TECHNIQUE: Noncontrast CT of the left lower extremity from the femoral  head through the knee joint. Images reviewed in both soft tissue and  bone windows. Coronal and sagittal reconstructions obtained.    FINDINGS:     Redemonstration postsurgical change of cephalomedullary construct with  single distal interlocking screw for intertrochanteric comminuted  fracture.    There is redemonstration of intramedullary crystal anterior perforation of  femur distally at the level of central trochlea with resultant  lipohemarthrosis with intra-articular bony fragments. Proximally,  starting approximately 10 cm above knee joint line or involving distal  6.2 cm of the crystal.    Subcutaneous emphysema around the hip. Soft tissue stranding and  likely hematoma overlying the left hip, in keeping with recent  surgical change.    Atherosclerotic calcifications. Colonic diverticulosis. Moderate fatty  infiltration/atrophy of the biceps femoris. Severe fatty replacement  of the semimembranosus distally.    Internal derangement of joint is not well assessed with CT technique.  Degenerative changes of knee and hip. Degenerative changes of pubic  symphysis. Bones are osteopenic.  Surgical staples at the laterally.      Impression    IMPRESSION:  1. Redemonstration postoperative change of left cephalomedullary  construct for comminuted intertrochanteric fracture with anterior  perforation of distal femoral cortex at the level of the central  trochlea involving approximately distal 6.2 cm of the crystal.  2. Osteopenic appearance.    I have personally reviewed the examination and initial interpretation  and I agree with the findings.    MALVIN MENON   XR Chest Port 1 View    Narrative    XR CHEST PORT 1 VW  4/29/2019 4:04 PM      HISTORY: hypoxia    COMPARISON: None.    FINDINGS: Postprocedural changes of aortic valve replacement. No  pneumothorax. Small left pleural effusion. Pulmonary vascular  congestion. Retrocardiac opacities. Cardiac silhouette is upper limits  of normal in size. Mild osteopenia. No acute osseous abnormalities.      Impression    IMPRESSION:   1. Pulmonary vascular congestion/mild pulmonary edema.  2. Left pleural effusion with associated atelectasis. Consider  pneumonia clinically.    I have personally reviewed the examination and initial interpretation  and I agree with the findings.    MEDINA FLOREZ MD       Time Spent on this Encounter   I, Raceil Marques, personally saw the patient today and spent greater than 30 minutes discharging this patient.    We appreciate the opportunity to care for your patient while in the hospital.  Should you have any questions about their injuries or this discharge summary our contact information is below.    Trauma Services  [facility]   Department of Critical Care and Acute Care Surgery  [address] [place] [address] [address]  [place] [address]  Office: [phone]

## 2019-04-30 NOTE — PLAN OF CARE
6B Discharge Planner PT   Patient plan for discharge: home with assist  Current status: Requires ModA for bed mobility, demonstrated sit<>stand x3 reps up to FWW with Min Ax1 and cues for hand placement. Clarified weightbearing status with Orthopedic Resident Bassam Newton, patient previously WBAT to LLE, now TTWB due to L knee pain and concerns for erosion at distal femur from IMN, will remain TTWB at least 2 weeks until follow up appt.  Increased knee pain with all mobility, pt reports feeling she would be unable to support her weight to take a step therefore not appropriate for pivot transfer at this time, recommend use of sling for transfers.  AVSS on RA.  Barriers to return to prior living situation: level of assist, acute medical needs, weakness, post-op precaution, ambulation  Recommendations for discharge: TCU vs ARU  Rationale for recommendations: Pt is below baseline mobility, would benefit from continued therapy to progress strength, balance, and endurance for improved functional independence. Pending tolerance for therapy pt may be good ARU candidate as patient was previously independent with use of cane, lives with son who works during the day and may need to learn compensatory strategies for mobility if patient unable to tolerate ambulation with TTWB.        Entered by: Toshia Montesinos 04/30/2019 11:07 AM

## 2019-04-30 NOTE — PROGRESS NOTES
Transfer  Transferred from:   Via:bed  Reason for transfer:Pt appropriate for 6B- pt. Needing tele monitoring  Family: Aware of transfer  Belongings: Received with pt  Chart: Received with pt  Medications: Meds received from old unit with pt  2 RN Skin Assessment Completed By: Angie RN  Report received from: Emilia  Pt status: AxO, heart rates in 100's-110's. SR with LBBB and occasional PACs

## 2019-04-30 NOTE — PROGRESS NOTES
Report given to 6B nurse. Pt eating her dinner now & will call Float nurse to transfer pt to 6B for cardiac monitoring.     Emilia Davis RN

## 2019-04-30 NOTE — PLAN OF CARE
Discharge Planner OT   Patient plan for discharge: not discussed  Current status: High BP/HRs today limiting activity. Up to 213/108 with EOB sitting, but asymptomatic. Still high when returned to supine, with HR jumping from 120-140s. After 10 min supine rest, HR down to 100-110s, and BP down to 128/81, so dependently transferred to recliner with lift.   Barriers to return to prior living situation: Decreased ADL independence and activity tolerance  Recommendations for discharge: TCU vs ARU  Rationale for recommendations: Increase functional endurance and ADL independence at rehab       Entered by: Eliecer Donaldson 04/30/2019 4:34 PM

## 2019-04-30 NOTE — PLAN OF CARE
Neuro: A&Ox4. Pt. [place]. Can be forgetful.   Cardiac: SR/ST. Rates in 110's-120's. Hypertensive. Afebrile.   Respiratory: Sating upper 90's on RA.  GI/: Adequate urine output via purewick.  Diet/appetite: Tolerating regular diet  Activity:  Assist of 2.  Pain: Tylenol given for hip pain.   Skin: Doc @ bedside, new dressings in place.   LDA's: 2 PIV's LR @75ml/hr  500cc bolus given for lactic of 3.1.  Plan: Continue with POC. Notify primary team with changes.

## 2019-04-30 NOTE — PROGRESS NOTES
"Orthopaedic Surgery Progress Note     Subjective: Patient worked w/ PT/OT, they are recommending TCU vs ARU on discharge. Pain w/ knee ROM. Pain controlled this AM. Voiding spontaneously.  Denies CP, SOB, numbness or tingling, motor dysfunction or weakness.    Objective: /60   Pulse 77   Temp 98.3  F (36.8  C) (Oral)   Resp 16   Ht 1.575 m (5' 2\")   Wt 54.4 kg (119 lb 14.9 oz)   SpO2 96%   BMI 21.94 kg/m      General: NAD, alert somewhat confused, cooperative with exam.   Cardio: extremities wwp.   Respiratory: Non-labored breathing on room air.  MSK:   LLE: Toes wwp, DP/PT pulses palpable, cap refill < 2 secs  Fires EHL/FHL/GSC/TA  SILT SP/DP/Sa/Hand/T  Dressing c/d/i w/ 2 cm of strikethrough in the middle bandage.    Labs:  Hemoglobin   Date Value Ref Range Status   05/01/2019 10.9 (L) 11.7 - 15.7 g/dL Final     Imaging: Complete    Assessment and Plan: Bernadine Farrell is a 89 year old female with a left intertrochanteric hip fracture following mechanical fall sustained on 4/27/2019.    Now s/p L intertrochanteric fracture IMN w/ Dr. Das on 4/28/2019. Doing well     Trauma Primary, appreciate cares  Activity: Up with assist.  Weight bearing status:TTWB LLE given knee pain, continue to work on knee ROM  Antibiotics: complete  Diet: Regular. Nutrition consult ordered for dietary optimization to maximize appropriate healing response.  DVT prophylaxis: Lovenox 0.5 mg/kg x 6 weeks  Wound Care: Dressings may be changed per nursing.   Pain management: per primary  Physical Therapy/Occupational Therapy: Gait, transfers, ROM, ADL's.   Consults: PT, OT, Nutrition, appreciate assistance in caring for this patient.    Follow-up: Clinic with Dr. Das in 2 weeks for wound check and evaluation in clinic (schedulers emailed)  Dispo per primary.    Orthopaedic Surgery will continue to follow peripherally while patient is in-hospital. We are readily available should issues arise.  Please feel free to contact us " for questions, concerns, or other issues with which we may be of assistance. Thank you for the opportunity to care for this patient.     Bassam Newton MD  Orthopaedic Surgery, PGY1  Pager: [phone]      For questions about this patient during the day, please attempt to contact me at my pager ([phone]) prior to contacting the Orthopaedic Surgery resident on call. Thank you!

## 2019-05-01 ENCOUNTER — APPOINTMENT (OUTPATIENT)
Dept: PHYSICAL THERAPY | Facility: CLINIC | Age: 84
DRG: 480 | End: 2019-05-01
Payer: MEDICARE

## 2019-05-01 LAB
ANION GAP SERPL CALCULATED.3IONS-SCNC: 7 MMOL/L (ref 3–14)
BUN SERPL-MCNC: 11 MG/DL (ref 7–30)
CALCIUM SERPL-MCNC: 8.3 MG/DL (ref 8.5–10.1)
CHLORIDE SERPL-SCNC: 105 MMOL/L (ref 94–109)
CO2 SERPL-SCNC: 26 MMOL/L (ref 20–32)
CREAT SERPL-MCNC: 0.6 MG/DL (ref 0.52–1.04)
ERYTHROCYTE [DISTWIDTH] IN BLOOD BY AUTOMATED COUNT: 13.9 % (ref 10–15)
GFR SERPL CREATININE-BSD FRML MDRD: 80 ML/MIN/{1.73_M2}
GLUCOSE SERPL-MCNC: 115 MG/DL (ref 70–99)
HCT VFR BLD AUTO: 33.5 % (ref 35–47)
HGB BLD-MCNC: 10.9 G/DL (ref 11.7–15.7)
MAGNESIUM SERPL-MCNC: 2.1 MG/DL (ref 1.6–2.3)
MCH RBC QN AUTO: 29.5 PG (ref 26.5–33)
MCHC RBC AUTO-ENTMCNC: 32.5 G/DL (ref 31.5–36.5)
MCV RBC AUTO: 91 FL (ref 78–100)
PLATELET # BLD AUTO: 116 10E9/L (ref 150–450)
POTASSIUM SERPL-SCNC: 3.3 MMOL/L (ref 3.4–5.3)
POTASSIUM SERPL-SCNC: 3.9 MMOL/L (ref 3.4–5.3)
RBC # BLD AUTO: 3.7 10E12/L (ref 3.8–5.2)
SODIUM SERPL-SCNC: 139 MMOL/L (ref 133–144)
WBC # BLD AUTO: 9.2 10E9/L (ref 4–11)

## 2019-05-01 PROCEDURE — A9270 NON-COVERED ITEM OR SERVICE: HCPCS | Performed by: NURSE PRACTITIONER

## 2019-05-01 PROCEDURE — 99233 SBSQ HOSP IP/OBS HIGH 50: CPT | Performed by: CLINICAL NURSE SPECIALIST

## 2019-05-01 PROCEDURE — 80048 BASIC METABOLIC PNL TOTAL CA: CPT | Performed by: NURSE PRACTITIONER

## 2019-05-01 PROCEDURE — 85027 COMPLETE CBC AUTOMATED: CPT | Performed by: NURSE PRACTITIONER

## 2019-05-01 PROCEDURE — 36415 COLL VENOUS BLD VENIPUNCTURE: CPT | Performed by: SURGERY

## 2019-05-01 PROCEDURE — 97530 THERAPEUTIC ACTIVITIES: CPT | Mod: GP

## 2019-05-01 PROCEDURE — 25000132 ZZH RX MED GY IP 250 OP 250 PS 637: Performed by: NURSE PRACTITIONER

## 2019-05-01 PROCEDURE — 36415 COLL VENOUS BLD VENIPUNCTURE: CPT | Performed by: NURSE PRACTITIONER

## 2019-05-01 PROCEDURE — 12000001 ZZH R&B MED SURG/OB UMMC

## 2019-05-01 PROCEDURE — 25000128 H RX IP 250 OP 636: Performed by: NURSE PRACTITIONER

## 2019-05-01 PROCEDURE — 84132 ASSAY OF SERUM POTASSIUM: CPT | Performed by: SURGERY

## 2019-05-01 PROCEDURE — 83735 ASSAY OF MAGNESIUM: CPT | Performed by: NURSE PRACTITIONER

## 2019-05-01 PROCEDURE — 99233 SBSQ HOSP IP/OBS HIGH 50: CPT | Performed by: NURSE PRACTITIONER

## 2019-05-01 PROCEDURE — 25000128 H RX IP 250 OP 636: Performed by: STUDENT IN AN ORGANIZED HEALTH CARE EDUCATION/TRAINING PROGRAM

## 2019-05-01 RX ORDER — POTASSIUM CL/LIDO/0.9 % NACL 10MEQ/0.1L
10 INTRAVENOUS SOLUTION, PIGGYBACK (ML) INTRAVENOUS
Status: DISCONTINUED | OUTPATIENT
Start: 2019-05-01 | End: 2019-05-02 | Stop reason: HOSPADM

## 2019-05-01 RX ORDER — MAGNESIUM SULFATE HEPTAHYDRATE 40 MG/ML
4 INJECTION, SOLUTION INTRAVENOUS EVERY 4 HOURS PRN
Status: DISCONTINUED | OUTPATIENT
Start: 2019-05-01 | End: 2019-05-02 | Stop reason: HOSPADM

## 2019-05-01 RX ORDER — CIPROFLOXACIN 250 MG/1
250 TABLET, FILM COATED ORAL EVERY 12 HOURS
Status: ON HOLD | DISCHARGE
Start: 2019-05-01 | End: 2019-05-18

## 2019-05-01 RX ORDER — LABETALOL 20 MG/4 ML (5 MG/ML) INTRAVENOUS SYRINGE
10 ONCE
Status: COMPLETED | OUTPATIENT
Start: 2019-05-01 | End: 2019-05-01

## 2019-05-01 RX ORDER — POTASSIUM CHLORIDE 1.5 G/1.58G
20-40 POWDER, FOR SOLUTION ORAL
Status: DISCONTINUED | OUTPATIENT
Start: 2019-05-01 | End: 2019-05-02 | Stop reason: HOSPADM

## 2019-05-01 RX ORDER — POTASSIUM CHLORIDE 750 MG/1
20-40 TABLET, EXTENDED RELEASE ORAL
Status: DISCONTINUED | OUTPATIENT
Start: 2019-05-01 | End: 2019-05-02 | Stop reason: HOSPADM

## 2019-05-01 RX ORDER — POTASSIUM CHLORIDE 29.8 MG/ML
20 INJECTION INTRAVENOUS
Status: DISCONTINUED | OUTPATIENT
Start: 2019-05-01 | End: 2019-05-02 | Stop reason: HOSPADM

## 2019-05-01 RX ORDER — AMLODIPINE BESYLATE 2.5 MG/1
2.5 TABLET ORAL DAILY
Status: DISCONTINUED | OUTPATIENT
Start: 2019-05-01 | End: 2019-05-02 | Stop reason: HOSPADM

## 2019-05-01 RX ORDER — POTASSIUM CHLORIDE 7.45 MG/ML
10 INJECTION INTRAVENOUS
Status: DISCONTINUED | OUTPATIENT
Start: 2019-05-01 | End: 2019-05-02 | Stop reason: HOSPADM

## 2019-05-01 RX ORDER — METOPROLOL TARTRATE 25 MG/1
25 TABLET, FILM COATED ORAL 2 TIMES DAILY
Status: DISCONTINUED | OUTPATIENT
Start: 2019-05-01 | End: 2019-05-02 | Stop reason: HOSPADM

## 2019-05-01 RX ORDER — CIPROFLOXACIN 250 MG/1
250 TABLET, FILM COATED ORAL EVERY 12 HOURS SCHEDULED
Status: DISCONTINUED | OUTPATIENT
Start: 2019-05-01 | End: 2019-05-02 | Stop reason: HOSPADM

## 2019-05-01 RX ORDER — OXYCODONE HYDROCHLORIDE 5 MG/1
2.5-5 TABLET ORAL
Qty: 35 TABLET | Refills: 0 | Status: SHIPPED | DISCHARGE
Start: 2019-05-01 | End: 2019-05-02

## 2019-05-01 RX ADMIN — LEVOTHYROXINE SODIUM 50 MCG: 50 TABLET ORAL at 08:03

## 2019-05-01 RX ADMIN — POTASSIUM CHLORIDE 20 MEQ: 750 TABLET, EXTENDED RELEASE ORAL at 17:57

## 2019-05-01 RX ADMIN — ACETAMINOPHEN 1000 MG: 500 TABLET, FILM COATED ORAL at 08:02

## 2019-05-01 RX ADMIN — CIPROFLOXACIN HYDROCHLORIDE 250 MG: 250 TABLET, FILM COATED ORAL at 08:36

## 2019-05-01 RX ADMIN — ACETAMINOPHEN 1000 MG: 500 TABLET, FILM COATED ORAL at 23:57

## 2019-05-01 RX ADMIN — ATORVASTATIN CALCIUM 10 MG: 10 TABLET, FILM COATED ORAL at 20:41

## 2019-05-01 RX ADMIN — VITAMIN D, TAB 1000IU (100/BT) 1000 UNITS: 25 TAB at 08:02

## 2019-05-01 RX ADMIN — CALCIUM 500 MG: 500 TABLET ORAL at 17:09

## 2019-05-01 RX ADMIN — SENNOSIDES AND DOCUSATE SODIUM 2 TABLET: 8.6; 5 TABLET ORAL at 20:42

## 2019-05-01 RX ADMIN — AMLODIPINE BESYLATE 2.5 MG: 2.5 TABLET ORAL at 08:36

## 2019-05-01 RX ADMIN — SENNOSIDES AND DOCUSATE SODIUM 2 TABLET: 8.6; 5 TABLET ORAL at 08:02

## 2019-05-01 RX ADMIN — Medication 2.5 MG: at 14:59

## 2019-05-01 RX ADMIN — CALCIUM 500 MG: 500 TABLET ORAL at 08:03

## 2019-05-01 RX ADMIN — LABETALOL 20 MG/4 ML (5 MG/ML) INTRAVENOUS SYRINGE 10 MG: at 04:44

## 2019-05-01 RX ADMIN — METOPROLOL TARTRATE 25 MG: 25 TABLET ORAL at 20:41

## 2019-05-01 RX ADMIN — CLOPIDOGREL BISULFATE 75 MG: 75 TABLET, FILM COATED ORAL at 08:03

## 2019-05-01 RX ADMIN — LIDOCAINE 1 PATCH: 560 PATCH PERCUTANEOUS; TOPICAL; TRANSDERMAL at 08:03

## 2019-05-01 RX ADMIN — MULTIPLE VITAMINS W/ MINERALS TAB 1 TABLET: TAB at 11:03

## 2019-05-01 RX ADMIN — CIPROFLOXACIN HYDROCHLORIDE 250 MG: 250 TABLET, FILM COATED ORAL at 20:41

## 2019-05-01 RX ADMIN — POTASSIUM CHLORIDE 40 MEQ: 750 TABLET, EXTENDED RELEASE ORAL at 08:37

## 2019-05-01 RX ADMIN — CEFTRIAXONE SODIUM 1 G: 1 INJECTION, POWDER, FOR SOLUTION INTRAMUSCULAR; INTRAVENOUS at 04:17

## 2019-05-01 RX ADMIN — ASPIRIN 81 MG: 81 TABLET, COATED ORAL at 08:03

## 2019-05-01 RX ADMIN — POTASSIUM CHLORIDE 20 MEQ: 750 TABLET, EXTENDED RELEASE ORAL at 11:03

## 2019-05-01 RX ADMIN — METOPROLOL TARTRATE 25 MG: 25 TABLET ORAL at 08:36

## 2019-05-01 ASSESSMENT — ACTIVITIES OF DAILY LIVING (ADL)
ADLS_ACUITY_SCORE: 17
ADLS_ACUITY_SCORE: 19
ADLS_ACUITY_SCORE: 19
ADLS_ACUITY_SCORE: 17
ADLS_ACUITY_SCORE: 16
ADLS_ACUITY_SCORE: 19

## 2019-05-01 ASSESSMENT — MIFFLIN-ST. JEOR: SCORE: 917.25

## 2019-05-01 NOTE — PROGRESS NOTES
[facility]  Palliative Care Daily Progress Note       Recommendations & Counseling       Full code, patient and son to discuss her wishes and will notify team if anything changes    Should she decide something different than full code would recommend a POLST prior to discharge        Assessments          Bernadine Farrell is a 89 year old female I saw today for pain management and goals of care in the setting of a fall and now s/p left femur intermedullary nailing for traumatic femur fracture on 4/28.     Today, the patient was seen for: goals of care    Prognosis, Goals, or Advance Care Planning was addressed today with: Yes  Summary of any discussion: discussed with patient's son on the phone regarding her code status, he has not discussed with his mother about whether she would want CPR or not, discussed if they do end of having that conversation while here to let the doctors know if her wishes are something different than what is currently listed.     Mood, coping, and/or meaning in the context of serious illness were addressed today: No            Interval History:   Chart review/discussion with unit or clinical team members:   Notes reviewed, making progress and discharge soon to a TCU    Per patient or family/caregivers today:  She is feeling like she could walk out of here if her knee didn't hurt, she wants to get to TCU soon.     Key Palliative Symptoms:  We are not helping to manage these symptoms currently in this patient.    Patient is on opioids: bowels not assessed today.           Review of Systems:   Besides above, ROS was reviewed and is unremarkable          Medications:   I have reviewed this patient's medication profile and medications during this hospitalization.    Noted meds:    Lidocaine patch  Senna 1-2 tablets BID  Acetaminophen PRN  Oxycodone PRN- x3 (7.5 mg)             Physical Exam:   Vitals were reviewed  Temp: 98.7  F (37.1  C) Temp src: Oral BP:  168/74 Pulse: 77 Heart Rate: 95 Resp: 18 SpO2: 98 % O2 Device: None (Room air) Oxygen Delivery: 2 LPM    Intake/Output Summary (Last 24 hours) at 5/1/2019 1628  Last data filed at 5/1/2019 1400  Gross per 24 hour   Intake 930 ml   Output 1250 ml   Net -320 ml     Constitutional: Awake, alert, cooperative, no apparent distress  Lungs: No increased work of breathing  Neurologic: Awake, alert, oriented to name, place and time.   Neuropsychiatric: Normal affect, mood, orientation, memory and insight.  Skin: No rashes             Data Reviewed:     Reviewed recent labs, comments:   CMP  Recent Labs   Lab 05/01/19  0621 04/30/19  0415 04/29/19  0345 04/28/19  1710 04/27/19  2133    140 135  --  137   POTASSIUM 3.3* 3.6 3.6  --  3.4   CHLORIDE 105 106 105  --  104   CO2 26 26 22  --  23   ANIONGAP 7 8 8  --  10   * 122* 114*  --  147*   BUN 11 8 14  --  15   CR 0.60 0.60 0.75 0.72 0.73   GFRESTIMATED 80 80 70 73 72   GFRESTBLACK >90 >90 81 84 83   SHANNAN 8.3* 8.0* 7.7*  --  8.6   MAG 2.1 1.8 1.6  --   --    PHOS  --   --  2.3*  --   --      CBC  Recent Labs   Lab 05/01/19  0621 04/30/19  1152 04/30/19  0500 04/30/19  0415  04/29/19  1012 04/29/19  0345   WBC 9.2  --   --  10.2  --  8.3 7.8   RBC 3.70*  --   --  3.85  --  2.69* 2.62*   HGB 10.9* 11.7 11.1* 11.4*   < > 7.9* 7.7*   HCT 33.5*  --   --  34.6*  --  25.8* 25.1*   MCV 91  --   --  90  --  96 96   MCH 29.5  --   --  29.6  --  29.4 29.4   MCHC 32.5  --   --  32.9  --  30.6* 30.7*   RDW 13.9  --   --  13.9  --  13.3 13.4   *  --   --  100*  --  104* 102*    < > = values in this interval not displayed.       LV Layne CNS  Palliative Care Consult Team  Pager: [phone]     Total time spent was 35 minutes,  >50% of time was spent counseling and/or coordination of care regarding goals of care.

## 2019-05-01 NOTE — PLAN OF CARE
"/74 (BP Location: Left arm)   Pulse 77   Temp 98.7  F (37.1  C) (Oral)   Resp 18   Ht 1.575 m (5' 2\")   Wt 54.4 kg (119 lb 14.9 oz)   SpO2 98%   BMI 21.94 kg/m       Neuro: A&Ox3, forgetful. Bed alarm on  Cardiac: /74 at 3:40pm. Trauma said to watch it for now. Tele SR/ST/ BBB.   Respiratory: O2 2L NC   GI/: Adequate urine output via pure wick, incontinent of urine x2. BM x1  Diet/appetite: Tolerating diet. Denies nausea  Activity: Ceiling lift when getting out of bed per Therapy  Pain: At acceptable level on current regimen. Oxy 2.5mg and tylenol given x1 with adequate relief  Skin: Left hip dressing with scant drainage c/d/i  LDA's: PIV x2 SL'd    K+ 3.3 and was replaced per protocol. Re checked is pending.      "

## 2019-05-01 NOTE — PLAN OF CARE
"/74 (BP Location: Left arm)   Pulse 77   Temp 98.7  F (37.1  C) (Oral)   Resp 18   Ht 1.575 m (5' 2\")   Wt 54.4 kg (119 lb 14.9 oz)   SpO2 96%   BMI 21.94 kg/m      Pt transfers to . Reports given to floor nurse. K+ re checked at 5pm came back at 3.9 and was replaced per protocol.   "

## 2019-05-01 NOTE — PLAN OF CARE
6B  Discharge Planner PT   Patient plan for discharge: not discussed  Current status: Pt completed supine <> sit with min A x 1-2, completed sit <> stand with FWW and min A x2, increased cues and assist to maintain LLE TTWB. Increased difficulty with sit <> stand compared to static standing. Pt unable to shift weight while maintaining precautions, recommend use of lift for transfers at this time.   Barriers to return to prior living situation: weight bearing status, impaired functional mobility  Recommendations for discharge: TCU  Rationale for recommendations: pt would benefit from TCU due to impaired activity tolerance, decreased strength, increased fatigue and decreased ability to complete transfers and functional mobility with new TTWB status.        Entered by: Itzel Blackwood 05/01/2019 4:58 PM

## 2019-05-01 NOTE — PLAN OF CARE
"Neuro: A&Ox4.   Cardiac: SR-sinus tachycardia with PAC's, HR 's. SBP's 170-180's this am, 1 dose of labetalol ordered.   No notification of a-fib overnight.   Respiratory: Sating >98% on RA. Clear/equal.   GI/: Adequate urine output via purewick. No BM.   Diet/appetite: Tolerating regular diet. Eating well.  Activity:  Assist of 1, LLE toe touch weight bearing.   Pain: At acceptable level on current regimen. PRN tylenol x1, declined additional oxycodone.   Skin: No new deficits noted. L hip dressing drainage marked.   LDA's: R and L PIV, SL.     Plan: Pt rested well without any complications. Transfer orders placed. Continue with POC. Notify primary team with changes.  /60   Pulse 77   Temp 98.3  F (36.8  C) (Oral)   Resp 16   Ht 1.575 m (5' 2\")   Wt 54.4 kg (119 lb 14.9 oz)   SpO2 96%   BMI 21.94 kg/m        "

## 2019-05-01 NOTE — PROGRESS NOTES
[facility], [place]  Trauma Service Progress Note    Date of Service (when I saw the patient): 05/01/2019     Assessment & Plan   Trauma Mechanism: Ground level fall  Date and time of injury: 04/27/2019  About 1730  Known Injuries:  1. Left intertrochanteric fracture     Other diagnoses:  1. Acute traumatic pain  2. Acute blood loss anemia  3. UTI with urosepsis  4. Hypertension  5. CAD stents on dual antiplatelet therapy, ASA and Plavix  6. Hx TAVR 2017- Aortic stenosis  7. Hx pAfib  8. Hypothyroidism      Procedure(s):  04/28/2019 S/P Intermedullary nailing - Dr. Das  Plan:  1. Tertiary completed with no other obvious injuries noted.   2. Femur fracture:  S/P left femur IMN    Activity status changed to toe touch weight bearing to the left lower extremity due to pain and risk for distal femur erosion     PT/OT    Dressing changed 04/30 by Ortho, scant shadow drainage noted. Compartments soft, +CMS    Calcium and Vit D supplements    Elevated LLE, ice to the left knee to help with swelling and pain    Follow up at [facility] with Dr. Das in 2 weeks for wound check and evaluation in clinic   3. Resp: No acute issues, Pulmonary toilet  4. Neuro/ Pain: Declined head CT on admission. Stable neuro exam    Continue routine neurochecks    Acute pain: Complains mainly of left knee pain, continue scheduled Tylenol, Lidoderm patches, PRN Oxycodone for severe pain. Ice to left knee and elevate    Delirium prevention measures: high risk given advanced age, recent trauma and hospitalization, narcotic medications and pain. Early mobility post op as able, awake during the day as much as able, allow for night time rest, pain control  5. Cardiac:  ECHO obtained 04/29 with stable EF 55-60%, normal bioprosthetic aortic valve doppler function. Denies shortness of breath and chest pain, denies feeling of rapid heart rate or fluttering today     Hx  Hypertension and A.fib: Resume PTA  metoprolol now at 25 mg. Resume home amlodipine today. Continue to hold home, Enalapril, Imdur, resume as able.    BMP stable, repleteing K     Resumed ASA and Plavix   6. Heme    Acute blood loss anemia: Stable Hemoglobin dropped from 12-->7.7 post op. Improved after 2units pRBC to 11. Left thigh remains swollen and soft, distal pulses palpable +2, LLE warm to touch, dressing CDI. Documented EBL 100ml. Blood loss is anticipated with long large bone fracture    Transfuse as needed for Hbg <7  7. FEN: Electrolytes replete per protocol   8. GI:  Regular diet  9. : UA/UC 10-50K colonies/mL of proteus mirabillis pan sensitive, some resistance to nitrofurantoin.      Transition to PO Cipro today   10. ID: Afebrile, normal white count, Ceftriaxone as above for UTI  11. Endocrine:     Hypothyroidism: Resume home Levothyroxine    Blood glucose WNL, monitor   12. Palliative care consult: routine geriatric trauma given advanced age, femur fracture.  13. PT/OT consults  14. Social work- discharge planning     General Cares:               PPI/H2 blocker: N/A              DVT prophylaxis: ASA              Bowel Regimen/Date of last stool: 04/30- ordered              Pulmonary toilet: IS, cough and deep breath              Lines / drains: PIV  Discharge goals:     Adequate pain management: ongoing    VSS x24 hours: yes     Hemoglobin stable x 48 hours: Yes     Ambulating safely and/or therapy evals complete: yes    Drains/lines removed or plan in place to manage: yes    Teaching done: ongoing    Other:  Expected D/C date: Today, pending TCU/ARU availability     Interval History   Course reviewed. No acute events. ST in the 100-110's this AM. Increase metoprolol back to home dose. Pain well controlled.   ROS x 8 negative with exception of those things listed in interval hx    Physical Exam   Temp: 98.5  F (36.9  C) Temp src: Axillary BP: 117/62 Pulse: 77 Heart Rate: 91 Resp: 18 SpO2: 96 % O2 Device: Nasal cannula Oxygen  Delivery: 2 LPM  Vitals:    04/27/19 1919 05/01/19 0448   Weight: 49.9 kg (110 lb) 54.4 kg (119 lb 14.9 oz)     Vital Signs with Ranges  Temp:  [97.6  F (36.4  C)-98.9  F (37.2  C)] 98.5  F (36.9  C)  Pulse:  [77-83] 77  Heart Rate:  [] 91  Resp:  [16-18] 18  BP: (112-187)/(53-94) 117/62  SpO2:  [94 %-99 %] 96 %  I/O last 3 completed shifts:  In: 795 [P.O.:370; I.V.:425]  Out: 1000 [Urine:1000]    Sowmya Coma Scale - Total 14/15  Eye Response (E): 4   4= spontaneous, 3= to verbal/voice, 2= to pain, 1= No response   Verbal Response (V): 4 confused to location   5= Orientated, converses, 4= Confused, converses, 3= Inappropriate words, 2= Incomprehensible sounds, 1=No response   Motor Response (M): 6   6= Obeys commands, 5= Localizes to pain, 4= Withdrawal to pain, 3=Fexion to pain, 2= Extension to pain, 1= No response   Constitutional: Awake, alert, cooperative, no apparent distress.  Eyes: Lids and lashes normal, pupils equal, round and reactive to light  ENT: Normocephalic, atraumatic  Respiratory: No increased work of breathing, good air exchange, clear to auscultation bilaterally, no crackles or wheezing.  Cardiovascular: SR, regular, normal S1 and S2  GI: Normal bowel sounds, abdomen soft, non-distended, non-tender, no guarding  Genitourinary:  Voids spontaneously, harrison per pure wick output  Skin:  Normal skin color, no redness, warmth, or swelling, no ecchymosis, left thigh incision covered, dressing with scant shadow draiange  Musculoskeletal: Tenderness with palpation of the left distal femur/knee, thigh is soft, no ecchymosis, +2 Pedal pulse palpated, warm to touch  Neurologic: Awake, alert, oriented. Strength and sensory is intact. No focal deficits.  Neuropsychiatric: Calm, normal eye contact, alert, affect appropriate to situation, oriented, thought process normal.    Raciel Marques NP  To contact the trauma service use job code pager [phone],   Numeric texts or alpha text through [facility]

## 2019-05-01 NOTE — PROGRESS NOTES
Social Work Services Progress Note    Hospital Day: 4  Date of Initial Social Work Evaluation:  4/29/19 - please see for details  Collaborated with:  Trauma NP (Raciel), TCU's (see below), Chart Review    Data:  Pt is 89 y/o female admitted to [facility] on 4/27/19 for left intertrochanteric femur fracture 2/2 fall. SW involved for placement at discharge.    SW received update from Trauma NP Raciel that Pt is ready for discharge today pending placement.    Intervention:  SW f/u with  TCU where referral had been initiated. Unfortunately, no beds available at this time.    SW met with Pt at bedside and provided update and discussed alternative options for TCU. She asks that SW f/u with her son Ricki to identify preferences. SW spoke with Ricki and he did provide additional TCU preferences and referrals were initiated today.    Referrals in Process:   - TCU ([id number]): MARTIN spoke with Kori today and there are no available beds; they will continue to follow in the event there is an unexpected opening.   -[facility] (Ph: [phone], Admissions: [phone], F: [phone]): MARTIN faxed referral via [employer].  -[facility] (Ph: [phone], Admissions: [phone], F: [phone]): MARTIN faxed referral via Epic.     Assessment:  TCU referrals are in process. Pt and son continue to be in agreement with this d/c plan. Pt is stable for d/c pending placement.    Plan:    Anticipated Disposition:  Facility:  TCU (D)    Barriers to d/c plan:  Placement    Follow Up:  SW to continue to follow and assist with discharge plan.    Addendum at 1440: MARTIN received call back from Radha in Admissions at [facility] as well as Ofelia in Admissions at [facility] that Pt is accepted for admission today/tomorrow.     SW called and spoke with son Ricki via the phone and updated him re: the above. He would like to tour both facilities before deciding where Pt will discharge to. He said that he can tour this afternoon and  will call MARTIN with update on where he would like Pt to discharge to.    MARTIN confirmed with Radha and Ofelia that pending his decision, they can accept Pt for admission tomorrow. They are both requesting Pt's SSN; not on file so will work on obtaining this.    HAIM Burns, SW  6B Intermediate Care Unit   Phone: [phone]  Pager: [phone]

## 2019-05-01 NOTE — PLAN OF CARE
Neuro: A&Ox4. [place].   Cardiac: SR/STach up to 100's. No evidence on strips but per report pt appeared to have random AFib beats overnight when 12-lead ECG obtained, showing STach + 1st degree block (PMH of paroxysmal AFib). HR briefly spiked to 120-130 for few seconds, didn't sustain. No tele calls. Hypertensive.   Respiratory: Sating >92% on RA. Using IS independently.  GI/: Adequate UOP via purewick. LBM 4/30, large/brown/soft.  Diet/appetite: Tolerating regular diet. Good appetite.  Activity:  Assist x2, gait belt & walker for pivot. *Toe-touch wt bearing per Ortho.  Pain: Both PRN Oxycodone 2.5mg & Tylenol given x2. Adequate relief of left knee & hip   Skin/LDA's: Left hip+leg dressings changed by Ortho this morning.   -PIV x2, saline locked. Gave 2g Mag, no recheck. No MIVF.  -Purewick    Plan: Continue with POC. Transfer orders to med/surg in place. Notify primary team with changes.

## 2019-05-01 NOTE — PROVIDER NOTIFICATION
Time of notification: 4:30 AM  Provider notified: Trauma  Patient status: /78, pt asymptomatic     Orders received: 10 mg labetalol ordered.

## 2019-05-02 ENCOUNTER — APPOINTMENT (OUTPATIENT)
Dept: PHYSICAL THERAPY | Facility: CLINIC | Age: 84
DRG: 480 | End: 2019-05-02
Payer: MEDICARE

## 2019-05-02 ENCOUNTER — HOSPITAL ENCOUNTER (INPATIENT)
Facility: SKILLED NURSING FACILITY | Age: 84
LOS: 17 days | Discharge: HOME HEALTH | DRG: 560 | End: 2019-05-19
Attending: INTERNAL MEDICINE | Admitting: INTERNAL MEDICINE
Payer: MEDICARE

## 2019-05-02 ENCOUNTER — APPOINTMENT (OUTPATIENT)
Dept: OCCUPATIONAL THERAPY | Facility: CLINIC | Age: 84
DRG: 480 | End: 2019-05-02
Payer: MEDICARE

## 2019-05-02 VITALS
WEIGHT: 119.93 LBS | HEIGHT: 62 IN | SYSTOLIC BLOOD PRESSURE: 165 MMHG | RESPIRATION RATE: 18 BRPM | BODY MASS INDEX: 22.07 KG/M2 | DIASTOLIC BLOOD PRESSURE: 69 MMHG | OXYGEN SATURATION: 98 % | TEMPERATURE: 98 F | HEART RATE: 77 BPM

## 2019-05-02 DIAGNOSIS — S72.001A CLOSED FRACTURE OF RIGHT HIP, INITIAL ENCOUNTER (H): Primary | ICD-10-CM

## 2019-05-02 PROCEDURE — A9270 NON-COVERED ITEM OR SERVICE: HCPCS | Performed by: NURSE PRACTITIONER

## 2019-05-02 PROCEDURE — 25000132 ZZH RX MED GY IP 250 OP 250 PS 637: Performed by: NURSE PRACTITIONER

## 2019-05-02 PROCEDURE — 99239 HOSP IP/OBS DSCHRG MGMT >30: CPT | Performed by: NURSE PRACTITIONER

## 2019-05-02 PROCEDURE — 97110 THERAPEUTIC EXERCISES: CPT | Mod: GO | Performed by: OCCUPATIONAL THERAPIST

## 2019-05-02 PROCEDURE — 12000022 ZZH R&B SNF

## 2019-05-02 PROCEDURE — 97535 SELF CARE MNGMENT TRAINING: CPT | Mod: GO | Performed by: OCCUPATIONAL THERAPIST

## 2019-05-02 PROCEDURE — 97530 THERAPEUTIC ACTIVITIES: CPT | Mod: GP

## 2019-05-02 PROCEDURE — 97110 THERAPEUTIC EXERCISES: CPT | Mod: GP

## 2019-05-02 RX ORDER — CALCIUM CARBONATE 500(1250)
500 TABLET ORAL 2 TIMES DAILY WITH MEALS
Status: DISCONTINUED | OUTPATIENT
Start: 2019-05-02 | End: 2019-05-19 | Stop reason: HOSPADM

## 2019-05-02 RX ORDER — ACETAMINOPHEN 325 MG/1
650 TABLET ORAL EVERY 4 HOURS PRN
Status: DISCONTINUED | OUTPATIENT
Start: 2019-05-02 | End: 2019-05-02

## 2019-05-02 RX ORDER — ONDANSETRON 4 MG/1
4 TABLET, ORALLY DISINTEGRATING ORAL EVERY 6 HOURS PRN
Status: DISCONTINUED | OUTPATIENT
Start: 2019-05-02 | End: 2019-05-19 | Stop reason: HOSPADM

## 2019-05-02 RX ORDER — LEVOTHYROXINE SODIUM 25 UG/1
50 TABLET ORAL DAILY
Status: DISCONTINUED | OUTPATIENT
Start: 2019-05-03 | End: 2019-05-19 | Stop reason: HOSPADM

## 2019-05-02 RX ORDER — POLYETHYLENE GLYCOL 3350 17 G/17G
17 POWDER, FOR SOLUTION ORAL DAILY PRN
Status: DISCONTINUED | OUTPATIENT
Start: 2019-05-02 | End: 2019-05-19 | Stop reason: HOSPADM

## 2019-05-02 RX ORDER — METOPROLOL TARTRATE 25 MG/1
25 TABLET, FILM COATED ORAL 2 TIMES DAILY
Status: DISCONTINUED | OUTPATIENT
Start: 2019-05-02 | End: 2019-05-19 | Stop reason: HOSPADM

## 2019-05-02 RX ORDER — ACETAMINOPHEN 650 MG/1
650 SUPPOSITORY RECTAL EVERY 4 HOURS PRN
Status: DISCONTINUED | OUTPATIENT
Start: 2019-05-02 | End: 2019-05-19 | Stop reason: HOSPADM

## 2019-05-02 RX ORDER — NALOXONE HYDROCHLORIDE 0.4 MG/ML
.1-.4 INJECTION, SOLUTION INTRAMUSCULAR; INTRAVENOUS; SUBCUTANEOUS
Status: DISCONTINUED | OUTPATIENT
Start: 2019-05-02 | End: 2019-05-19 | Stop reason: HOSPADM

## 2019-05-02 RX ORDER — ASPIRIN 81 MG/1
162 TABLET, CHEWABLE ORAL DAILY
Status: DISCONTINUED | OUTPATIENT
Start: 2019-05-03 | End: 2019-05-19 | Stop reason: HOSPADM

## 2019-05-02 RX ORDER — LIDOCAINE 4 G/G
2 PATCH TOPICAL
Status: DISCONTINUED | OUTPATIENT
Start: 2019-05-03 | End: 2019-05-19 | Stop reason: HOSPADM

## 2019-05-02 RX ORDER — CIPROFLOXACIN 250 MG/1
250 TABLET, FILM COATED ORAL EVERY 12 HOURS
Status: COMPLETED | OUTPATIENT
Start: 2019-05-02 | End: 2019-05-03

## 2019-05-02 RX ORDER — AMLODIPINE BESYLATE 2.5 MG/1
2.5 TABLET ORAL DAILY
Status: DISCONTINUED | OUTPATIENT
Start: 2019-05-03 | End: 2019-05-18

## 2019-05-02 RX ORDER — ATORVASTATIN CALCIUM 10 MG/1
10 TABLET, FILM COATED ORAL DAILY
Status: DISCONTINUED | OUTPATIENT
Start: 2019-05-02 | End: 2019-05-19 | Stop reason: HOSPADM

## 2019-05-02 RX ORDER — AMOXICILLIN 250 MG
1-2 CAPSULE ORAL 2 TIMES DAILY
Status: DISCONTINUED | OUTPATIENT
Start: 2019-05-02 | End: 2019-05-19 | Stop reason: HOSPADM

## 2019-05-02 RX ORDER — ACETAMINOPHEN 500 MG
1000 TABLET ORAL EVERY 8 HOURS PRN
Status: DISCONTINUED | OUTPATIENT
Start: 2019-05-02 | End: 2019-05-19 | Stop reason: HOSPADM

## 2019-05-02 RX ORDER — OXYCODONE HYDROCHLORIDE 5 MG/1
2.5-5 TABLET ORAL
Qty: 40 TABLET | Refills: 0 | Status: ON HOLD | DISCHARGE
Start: 2019-05-02 | End: 2019-05-18

## 2019-05-02 RX ORDER — ONDANSETRON 2 MG/ML
4 INJECTION INTRAMUSCULAR; INTRAVENOUS EVERY 6 HOURS PRN
Status: DISCONTINUED | OUTPATIENT
Start: 2019-05-02 | End: 2019-05-19 | Stop reason: HOSPADM

## 2019-05-02 RX ORDER — MULTIPLE VITAMINS W/ MINERALS TAB 9MG-400MCG
1 TAB ORAL DAILY
Status: DISCONTINUED | OUTPATIENT
Start: 2019-05-03 | End: 2019-05-19 | Stop reason: HOSPADM

## 2019-05-02 RX ORDER — CLOPIDOGREL BISULFATE 75 MG/1
75 TABLET ORAL DAILY
Status: DISCONTINUED | OUTPATIENT
Start: 2019-05-03 | End: 2019-05-19 | Stop reason: HOSPADM

## 2019-05-02 RX ADMIN — ACETAMINOPHEN 1000 MG: 500 TABLET, FILM COATED ORAL at 13:28

## 2019-05-02 RX ADMIN — CLOPIDOGREL BISULFATE 75 MG: 75 TABLET, FILM COATED ORAL at 08:36

## 2019-05-02 RX ADMIN — CALCIUM 500 MG: 500 TABLET ORAL at 18:49

## 2019-05-02 RX ADMIN — CIPROFLOXACIN HYDROCHLORIDE 250 MG: 250 TABLET, FILM COATED ORAL at 21:42

## 2019-05-02 RX ADMIN — ATORVASTATIN CALCIUM 10 MG: 10 TABLET, FILM COATED ORAL at 21:39

## 2019-05-02 RX ADMIN — METOPROLOL TARTRATE 25 MG: 25 TABLET, FILM COATED ORAL at 21:40

## 2019-05-02 RX ADMIN — ACETAMINOPHEN 1000 MG: 500 TABLET ORAL at 21:40

## 2019-05-02 RX ADMIN — VITAMIN D, TAB 1000IU (100/BT) 1000 UNITS: 25 TAB at 08:36

## 2019-05-02 RX ADMIN — SENNOSIDES AND DOCUSATE SODIUM 2 TABLET: 8.6; 5 TABLET ORAL at 21:39

## 2019-05-02 RX ADMIN — SENNOSIDES AND DOCUSATE SODIUM 1 TABLET: 8.6; 5 TABLET ORAL at 08:36

## 2019-05-02 RX ADMIN — ASPIRIN 81 MG: 81 TABLET, COATED ORAL at 08:36

## 2019-05-02 RX ADMIN — METOPROLOL TARTRATE 25 MG: 25 TABLET ORAL at 08:35

## 2019-05-02 RX ADMIN — LEVOTHYROXINE SODIUM 50 MCG: 50 TABLET ORAL at 08:36

## 2019-05-02 RX ADMIN — LIDOCAINE 1 PATCH: 560 PATCH PERCUTANEOUS; TOPICAL; TRANSDERMAL at 08:33

## 2019-05-02 RX ADMIN — RANITIDINE 150 MG: 150 TABLET ORAL at 21:40

## 2019-05-02 RX ADMIN — CIPROFLOXACIN HYDROCHLORIDE 250 MG: 250 TABLET, FILM COATED ORAL at 08:36

## 2019-05-02 RX ADMIN — AMLODIPINE BESYLATE 2.5 MG: 2.5 TABLET ORAL at 08:36

## 2019-05-02 RX ADMIN — MULTIPLE VITAMINS W/ MINERALS TAB 1 TABLET: TAB at 13:28

## 2019-05-02 RX ADMIN — CALCIUM 500 MG: 500 TABLET ORAL at 08:35

## 2019-05-02 ASSESSMENT — ACTIVITIES OF DAILY LIVING (ADL)
ADLS_ACUITY_SCORE: 16
ADLS_ACUITY_SCORE: 22
ADLS_ACUITY_SCORE: 16
ADLS_ACUITY_SCORE: 22
ADLS_ACUITY_SCORE: 16

## 2019-05-02 NOTE — PROGRESS NOTES
Social Work Services Discharge Note      Patient Name:  Bernadine Farrell     Anticipated Discharge Date:  5/2/19    Discharge Disposition:   TCU:   TCU   [address] 4th floor  [place], [place]  [phone]     Following MD:  To be determined at facility     Pre-Admission Screening (PAS) online form has been completed.  The Level of Care (LOC) is:  Determined  Confirmation Code is:  [id number]  Patient/caregiver informed of referral to [facility] for Pre-Admission Screening for skilled nursing facility (SNF) placement and to expect a phone call post discharge from SNF.     Additional Services/Equipment Arranged:  W/c transport arranged via [facility] ([phone]) for today at 5pm.      Patient / Family response to discharge plan:  Pt is agreeable as is her son Ricki     Persons notified of above discharge plan:  Pt, pt's son Ricki, bedside nurse, charge nurse, NST, receiving facility, Trauma team    Staff Discharge Instructions:  Please fax discharge orders and signed hard scripts for any controlled substances.  Please print a packet and send with patient.     CTS Handoff completed:  YES    Medicare Notice of Rights provided to the patient/family:  YES    HAIM Rankin, [facility] HospitalSW  7B   [phone] (pager) [phone]  5/2/2019

## 2019-05-02 NOTE — PLAN OF CARE
Pt discharging to  TCU. Paperwork sent with pt. Transported via . Belongings sent with pt's son. PIV removed prior to discharge.

## 2019-05-02 NOTE — PLAN OF CARE
"/74 (BP Location: Left arm)   Pulse 77   Temp 98.7  F (37.1  C) (Oral)   Resp 18   Ht 1.575 m (5' 2\")   Wt 54.4 kg (119 lb 14.9 oz)   SpO2 96%   BMI 21.94 kg/m      Neuro: A&Ox3, forgetful, alarms on for saftey  Cardiac: Can be tachy, BP was elevated this AM, team aware, will continue to monitor, other VSS  Raspiratory: WDL, pt is on RA, denies SOB or difficulty breathing, LS clear  GI/: +BS, +flatus, BM this AM, pt incont on urine, using pure wick to help keep L leg dressings dry  Skin: Pt has x3 dressings on L leg, dressing on hip marked w/ drainage, L leg more edematous than R leg  Pain: Pt currently denies pain,   LDA: Pt has x1 PIV that is SL, pure wick in place  Activity: Astx2, pt is TTBW, must use lift to get OOB per therapy note  Diet/Appetite: Reg diet, tolerating well, denies nausea, good oral intake  Plan: Probable discharge to TCU tomorrow pending bed availability, son went and toured facilities this afternoon and connected w/ SW. Will continue w/ POC    "

## 2019-05-02 NOTE — PLAN OF CARE
7B OT Discharge Planner OT   Patient plan for discharge: TCU  Current status: Pt participated in UEAROM exercises. Pt HR fluctuated from 120s-140s throughout session. Nursing notified. Pt SBA for supine to sit transfer and Min A for sit to supine transfer requiring assist to lift legs on to bed. Pt Min A for LE dressing requiring assist to thread foot through clothing. Administered Short Blessid Test. Pt scored 0/28 (0-8 = WNL).   Barriers to return to prior living situation: Acute medical needs, mobility, decreased activity tolerance, decreased independence with ADL  Recommendations for discharge: TCU  Rationale for recommendations: Pt is currently below baseline with regards to mobility and independence with self cares and will benefit from continued skilled therapy intervention to address deficits.         Entered by: Tanisha Ivan 05/02/2019 4:25 PM

## 2019-05-02 NOTE — PLAN OF CARE
AVSS ex B/P slightly elevated. RA. C/o aching pain to left knee, tylenol, lidocaine patch, and ice packs with relief. Left hip, thigh, and knee incisions cdi, dressings changed. TTWB LLE. CMS intact, palpable pulses. Incontinent of urine and purewick in place with adequate UOP. Tolerating diet, denies nausea. No BM. Got up with PT. Plan to discharge to  TCU @5pm.

## 2019-05-02 NOTE — PLAN OF CARE
Vital signs:  Temp: 97.9  F (36.6  C) Temp src: Oral BP: 116/64 Pulse: 77 Heart Rate: 92 Resp: 16 SpO2: 94 % O2 Device: None (Room air) Oxygen Delivery: 2 LPM     Activity: TTWB to LLE, lift needed for transfers when OOB.  Neuros: A&O x4, forgetful, bed alarm on.   Cardiac: WDL ex hypertensive at times.  Respiratory: WDL on RA. Denies SOB.   GI/: Purewick in place, output of 300 mL. +BS, passing flatus.  Diet: Regular.  Lines: Right PIV SL.  Incisions: Dressings to left hip and left thigh c/d/ii. Dressing to left knee with marked drainage.   Pain/nausea: Tylenol given x1. Denies nausea. Sleeping most of night.  Plan: Discharge to TCU.

## 2019-05-02 NOTE — PLAN OF CARE
7B  Discharge Planner PT   Patient plan for discharge: TCU  Current status: Pt completed sit <> stand and pivot transfer to w/c x 2 with FWW and min-CGA x 2, improved ability to maintain TTWB with focus on putting no weight through LLE and keeping LLE extended in front of patient with sit <> stands. Min-CGA with LLE exercises while in supine, SBA with RLE.   Barriers to return to prior living situation: impaired strength, fall risk, impaired functional mobility  Recommendations for discharge: TCU  Rationale for recommendations: Pt with limited activity tolerance and quick to fatigue, demonstrates impairments in strength, activity tolerance, and balance. Demonstrates good participation and progress with therapy.        Entered by: Itzel Blackwood 05/02/2019 1:32 PM

## 2019-05-03 PROCEDURE — 97167 OT EVAL HIGH COMPLEX 60 MIN: CPT | Mod: GO | Performed by: OCCUPATIONAL THERAPIST

## 2019-05-03 PROCEDURE — 97110 THERAPEUTIC EXERCISES: CPT | Mod: GP | Performed by: PHYSICAL THERAPIST

## 2019-05-03 PROCEDURE — 97116 GAIT TRAINING THERAPY: CPT | Mod: GP | Performed by: PHYSICAL THERAPIST

## 2019-05-03 PROCEDURE — A9270 NON-COVERED ITEM OR SERVICE: HCPCS | Performed by: NURSE PRACTITIONER

## 2019-05-03 PROCEDURE — 25000128 H RX IP 250 OP 636: Performed by: NURSE PRACTITIONER

## 2019-05-03 PROCEDURE — 99309 SBSQ NF CARE MODERATE MDM 30: CPT | Performed by: NURSE PRACTITIONER

## 2019-05-03 PROCEDURE — 97162 PT EVAL MOD COMPLEX 30 MIN: CPT | Mod: GP | Performed by: PHYSICAL THERAPIST

## 2019-05-03 PROCEDURE — 25000125 ZZHC RX 250: Performed by: NURSE PRACTITIONER

## 2019-05-03 PROCEDURE — 97535 SELF CARE MNGMENT TRAINING: CPT | Mod: GO | Performed by: OCCUPATIONAL THERAPIST

## 2019-05-03 PROCEDURE — 12000022 ZZH R&B SNF

## 2019-05-03 PROCEDURE — 25000132 ZZH RX MED GY IP 250 OP 250 PS 637: Performed by: NURSE PRACTITIONER

## 2019-05-03 RX ADMIN — CALCIUM 500 MG: 500 TABLET ORAL at 08:05

## 2019-05-03 RX ADMIN — SENNOSIDES AND DOCUSATE SODIUM 2 TABLET: 8.6; 5 TABLET ORAL at 08:05

## 2019-05-03 RX ADMIN — MULTIPLE VITAMINS W/ MINERALS TAB 1 TABLET: TAB at 08:05

## 2019-05-03 RX ADMIN — SENNOSIDES AND DOCUSATE SODIUM 2 TABLET: 8.6; 5 TABLET ORAL at 20:25

## 2019-05-03 RX ADMIN — CIPROFLOXACIN HYDROCHLORIDE 250 MG: 250 TABLET, FILM COATED ORAL at 08:05

## 2019-05-03 RX ADMIN — RANITIDINE 150 MG: 150 TABLET ORAL at 20:23

## 2019-05-03 RX ADMIN — RANITIDINE 150 MG: 150 TABLET ORAL at 08:05

## 2019-05-03 RX ADMIN — Medication 5 UNITS: at 08:07

## 2019-05-03 RX ADMIN — LIDOCAINE 2 PATCH: 560 PATCH PERCUTANEOUS; TOPICAL; TRANSDERMAL at 08:05

## 2019-05-03 RX ADMIN — CLOPIDOGREL BISULFATE 75 MG: 75 TABLET, FILM COATED ORAL at 08:05

## 2019-05-03 RX ADMIN — CALCIUM 500 MG: 500 TABLET ORAL at 18:45

## 2019-05-03 RX ADMIN — CIPROFLOXACIN HYDROCHLORIDE 250 MG: 250 TABLET, FILM COATED ORAL at 20:23

## 2019-05-03 RX ADMIN — AMLODIPINE BESYLATE 2.5 MG: 2.5 TABLET ORAL at 08:05

## 2019-05-03 RX ADMIN — METOPROLOL TARTRATE 25 MG: 25 TABLET, FILM COATED ORAL at 08:05

## 2019-05-03 RX ADMIN — ASPIRIN 81 MG CHEWABLE TABLET 162 MG: 81 TABLET CHEWABLE at 08:04

## 2019-05-03 RX ADMIN — ENOXAPARIN SODIUM 30 MG: 30 INJECTION SUBCUTANEOUS at 14:47

## 2019-05-03 RX ADMIN — LEVOTHYROXINE SODIUM 50 MCG: 25 TABLET ORAL at 08:05

## 2019-05-03 RX ADMIN — VITAMIN D, TAB 1000IU (100/BT) 1000 UNITS: 25 TAB at 08:04

## 2019-05-03 RX ADMIN — ATORVASTATIN CALCIUM 10 MG: 10 TABLET, FILM COATED ORAL at 20:23

## 2019-05-03 ASSESSMENT — ACTIVITIES OF DAILY LIVING (ADL): PREVIOUS_RESPONSIBILITIES: MEAL PREP;HOUSEKEEPING;LAUNDRY;MEDICATION MANAGEMENT;FINANCES

## 2019-05-03 NOTE — PLAN OF CARE
RN: Denies pain. Using pure wick for urinary inc, no problems noted. Using call light appropriately, up with staff only.  See flow sheet. TTWB LLE, continue hip precautions.

## 2019-05-03 NOTE — PLAN OF CARE
Kalen completed. Pt very cooperative. Limited by TTWB L; able to amb 5 ft with ww, min A. Pt open to using ww at home; w/c if needed--thinks would fit in apt.

## 2019-05-03 NOTE — PLAN OF CARE
Patient is a new admit as of yesterday. Alert and oriented x4, pleasant. Makes needs known. Denied any pain during encounters. Slept well overnight. Pure wick external catheter in place, adequate amounts of urine output. Call light in reach. Continue to monitor.

## 2019-05-03 NOTE — PLAN OF CARE
Patient is a 89 year old female  admitted to room 430 via wheelchair.  Patient is alert and oriented X 3. See Epic for VS and assessment.  Patient is able to transfer  A-2 using liko lift. Patient was settled into their room, shown call light, tv, mealtimes etc. Will continue monitoring pain level and VS. Notifying MD with any concerns.  Follow MD orders for cares and medications.    Level of Schooling:high school  Ethnicity:  Marital Status:  Dentures: Yes  Hearing Aid: No  Smoker:  No  Glasses: Yes  Occupation: retired  Falls 0-1 mo: 1 2-6 mo: 0  Stairs prior function: Needed some help  Prior device use: straight cane   Advanced Care Directive Referral to Social Work? No       Note: Skin assessment done by a resource RN, no skin issues/sores/OA noted, with the exception bruises  & surgical incisions.

## 2019-05-03 NOTE — PROGRESS NOTES
05/03/19 1400   Quick Adds   Quick Adds Certification   Type of Visit Initial Occupational Therapy Evaluation   Living Environment   Lives With child(clive), adult   Living Arrangements apartment   Home Accessibility no concerns   Transportation Anticipated family or friend will provide   Living Environment Comment lives with son who works flexible schedule 17 floor apartment bld   Self-Care   Usual Activity Tolerance good   Current Activity Tolerance poor   Regular Exercise No   Equipment Currently Used at Home cane, straight   Activity/Exercise/Self-Care Comment OT baseline independent with mobility IADL BADL   Functional Level   Ambulation 1-->assistive equipment   Transferring 1-->assistive equipment   Toileting 1-->assistive equipment   Bathing 0-->independent   Dressing 0-->independent   Eating 0-->independent   Communication 0-->understands/communicates without difficulty   Swallowing 0-->swallows foods/liquids without difficulty   Cognition 0 - no cognition issues reported   Fall history within last six months yes   Number of times patient has fallen within last six months 1   General Information   Onset of Illness/Injury or Date of Surgery - Date 04/27/19   Referring Physician raiza   Patient/Family Goals Statement improve strengtha nd mobility to go home   Additional Occupational Profile Info/Pertinent History of Current Problem UTI with urosepsis pain anemia post surgical precaution   Precautions/Limitations fall precautions   Weight-Bearing Status - LUE full weight-bearing   Weight-Bearing Status - RUE full weight-bearing   Weight-Bearing Status - LLE touch down weight-bearing   Weight-Bearing Status - RLE full weight-bearing   General Observations easily fatigued, challenged with WB status   Cognitive Status Examination   Orientation orientation to person, place and time   Level of Consciousness alert   Follows Commands (Cognition) WNL   Attention No deficits were identified   Organization/Problem  Solving No deficits were identified   Executive Function No deficits were identified   Cognitive Comment OT will continue to observe during functional activity   Visual Perception   Visual Perception Wears glasses   Sensory Examination   Sensory Comments WFL   Pain Assessment   Patient Currently in Pain Yes, see Vital Sign flowsheet   Integumentary/Edema   Integumentary/Edema Comments L LE edema   Posture   Posture Comments WFL    Range of Motion (ROM)   ROM Comment WFL   Strength   Strength Comments grossly 3+/5 strength grade with limited endurance   Hand Strength   Hand Strength Comments WFL   Muscle Tone Assessment   Muscle Tone Comments L LE eema   Coordination   Coordination Comments WFL   Mobility   Bed Mobility Comments standard bed at home no rail   Bed Mobility Skill: Rolling/Turning   Level of [place] - Bed Mobility Skill Rolling Turning stand-by assist   Physical Assist/Nonphysical Assist supervision   Assistive Device:  Rolling/Turning bed rails   Bed Mobility Skill: Supine to Sit   Level of [place]: Supine/Sit stand-by assist   Physical Assist/Nonphysical Assist: Supine/Sit set-up required;supervision;verbal cues   Assistive Device: Supine/Sit bedrail   Transfer Skills   Transfer Comments streight cane at baseline   Transfer Skill: Bed to Chair/Chair to Bed   Level of [place]: Bed to Chair moderate assist (50% patients effort)   Physical Assist/Nonphysical Assist: Bed to Chair set-up required;supervision;verbal cues;1 person assist   Weight-Bearing Restrictions toe touch weight-bearing   Assistive Device - Transfer Skill Bed to Chair Chair to Bed Rehab Eval rolling walker   Transfer Skill: Sit to Stand   Level of [place]: Sit/Stand minimum assist (75% patients effort)   Physical Assist/Nonphysical Assist: Sit/Stand set-up required;supervision;verbal cues;1 person assist   Transfer Skill: Sit to Stand toe touch weight-bearing   Assistive Device for Transfer: Sit/Stand rolling walker    Transfer Skill: Toilet Transfer   Level of [place]: Toilet moderate assist (50% patients effort)   Physical Assist/Nonphysical Assist: Toilet set-up required;supervision;verbal cues;1 person assist   Weight-Bearing Restrictions: Toilet toe touch weight-bearing   Assistive Device rolling walker;other (see comments)   Toilet Transfer Skill Comments [facility]   Balance   Balance Comments poor   Lower Body Dressing   Level of [place]: Dress Lower Body moderate assist (50% patients effort)   Physical Assist/Nonphysical Assist: Dress Lower Body set-up required;supervision;verbal cues;1 person assist   Assistive Device dressing stick;long-handled shoe horn;reacher   Toileting   Level of [place]: Toilet moderate assist (50% patients effort)   Physical Assist/Nonphysical Assist: Toilet set-up required;supervision;verbal cues;1 person assist   Assistive Device grab bars;rolling walker;other (see comments)  ([facility])   Eating/Self Feeding   Level of [place]: Eating independent   Instrumental Activities of Daily Living (IADL)   Previous Responsibilities meal prep;housekeeping;laundry;medication management;finances   Activities of Daily Living Analysis   Impairments Contributing to Impaired Activities of Daily Living balance impaired;fear and anxiety;flexibility decreased;muscle tone abnormal;pain;post surgical precautions;strength decreased   General Therapy Interventions   Planned Therapy Interventions ADL retraining;IADL retraining;balance training;bed mobility training;strengthening;transfer training;progressive activity/exercise;risk factor education   Clinical Impression   Criteria for Skilled Therapeutic Interventions Met yes, treatment indicated   OT Diagnosis deconditioning   Influenced by the following impairments post surgical precaution, pain, UTI with uro sepsis, anemia   Assessment of Occupational Performance 5 or more Performance Deficits   Identified Performance Deficits OT patient below baseline in  BADL IADL FX mobility strength endurance balance   Clinical Decision Making (Complexity) High complexity   Therapy Frequency daily   Predicted Duration of Therapy Intervention (days/wks) OT goals to be met by 5/5/18/19   Anticipated Discharge Disposition Home with Assist;Home with Home Therapy;Long Term Care Facility   Risks and Benefits of Treatment have been explained. Yes   Patient, Family & other staff in agreement with plan of care Yes   Therapy Certification   Start of Care Date 05/03/19   Certification date from 05/03/19   Certification date to 06/03/19   Medical Diagnosis L intertrochanter FX   Certification I certify the need for these services furnished under this plan of treatment and while under my care.  (Physician co-signature of this document indicates review and certification of the therapy plan).   Total Evaluation Time   Total Evaluation Time (Minutes) 15

## 2019-05-03 NOTE — PROGRESS NOTES
Social Work: Initial Assessment with Discharge Plan    Patient Name: Bernadine Farrell  : 1928  Age: 89 year old  MRN: [id number]  Completed assessment with: Pt   Admitted to TCU: 2019    Presenting Information   Date of SW assessment: May 3, 2019  Health Care Directive: Provided education  Primary Health Care Agent: Self  Secondary Health Care Agent: Pt's son would NOK per policy   Living Situation: Pt lives with her son in an Apartment in [place]    Previous Functional Status: Independent   DME available: See therapy notes   Patient and family understanding of hospitalization: Pt stated that she is here due to hip and knee surgery.   Cultural/Language/Spiritual Considerations: English speaking.   Abuse concerns: No concerns   BIMS:  Will be done closer to day 5  PHQ-9: Will be done closer to day 5  PAS: confirmation number- [id number]  Has there been a level II screen?  No  Were there any recommendations in the screen? N/A  If yes, will the recommendations we incorporated into the Plan of Care?  N/A  Physical Health  Reason for admission: Hip fracture (H)    Provider Information   Primary Care Physician:Dewayne Goyal   : AKASH    Mental Health:   Diagnosis: Denies concerns   Current Support/Services: NA  Previous Services: NA  Services Needed/Recommended: NA    Substance Use:  Diagnosis: Denies concerns   Current Support/Services: NA  Previous Services: NA  Services Needed/Recommended: NA    Support System  Marital Status:    Family support: Pt has the main support of her son Dewayne  Other support available: Limited   Gaps in support system: Limited family support     Community Resources  Current in home services: NA  Previous services: NA    Financial/Employment/Education  Employment Status: Retired   Income Source: SSI  Education: High school   Financial Concerns:  No concerns   Insurance: Medicare and BCBS      Discharge Plan   Patient and family discharge goal: Pt's  goal is to return home with her son   Provided Education on discharge plan: YES  Patient agreeable to discharge plan:  YES  A list of Medicare Certified Facilities was provided to the patient and/or family to encourage patient choice. Based on location and rating, patient would like referrals made to: NA  General information regarding anticipated insurance coverage and possible out of pocket cost was discussed. Patient and patient's family are aware patient may incur the cost of transportation to the facility, pending insurance payment: YES  Barriers to discharge: Medical clearance, safe discharge plan, complete therapy goals.     Discharge Recommendations   Disposition: Home with continued support.   Transportation Needs: Family to provide   Name of Transportation Company and Phone: NA    Additional comments   Writer introduced self and role of SW. Pt was pleasant and open to SW services while here on TCU.  Pt will receive her care plan goals and orders tomorrow 5/4 by weekend SW. Pt aware and agreeable. SW will continue to follow and assist as needed.    ASHLI Diamond  [facility]   P: [phone]  Pgr: [phone]             05/03/19 0900   Living Arrangements   Lives With child(clive), adult   Living Arrangements apartment   Able to Return to Prior Arrangements yes   Home Safety   Patient Feels Safe Living in Home? yes   Discharge Planning   Expected Discharge Date   (TBD)   Patient/Family Anticipates Transition to home with family;home with help/services   Discharge Needs Assessment   Transportation Anticipated family or friend will provide

## 2019-05-03 NOTE — PLAN OF CARE
Pt discharged to TCU yesterday    Physical Therapy Discharge Summary    Reason for therapy discharge:    Discharged to transitional care facility.    Progress towards therapy goal(s). See goals on Care Plan in [facility] electronic health record for goal details.  Goals not met.  Barriers to achieving goals:   discharge from facility.    Therapy recommendation(s):    Continued therapy is recommended.  Rationale/Recommendations:  TCU to progress functional mobility, ADLs and I.

## 2019-05-03 NOTE — PROGRESS NOTES
05/03/19 0802   Quick Adds   Quick Adds Certification   Type of Visit Initial PT Evaluation   Living Environment   Lives With child(clive), adult   Living Arrangements apartment   Home Accessibility no concerns   Transportation Anticipated family or friend will provide   Living Environment Comment Pt lives on 17th floor of apt building with adult son. Pt's son works during the day, and pt is alone while son is at work. Elevator available, no stairs within or to enter home. Pt has tub shower without grab bars. Pt does not use shower chair. Toilet does not have grab bars.    Self-Care   Usual Activity Tolerance good   Current Activity Tolerance fair   Regular Exercise No   Equipment Currently Used at Home cane, straight   Activity/Exercise/Self-Care Comment Pt reports prior independence with self cares. Pt reports primarily sponge bathing, however, son is available to assist prn for tub transfer when pt showers/bathes. Pt has fallen twice while donning/doffing shoes in past 6 months.    Functional Level Prior   Ambulation 1-->assistive equipment   Transferring 1-->assistive equipment   Toileting 1-->assistive equipment   Bathing 0-->independent  (sponge bathes)   Communication 0-->understands/communicates without difficulty   Swallowing 0-->swallows foods/liquids without difficulty   Cognition 0 - no cognition issues reported   Fall history within last six months yes   Number of times patient has fallen within last six months 1   Which of the above functional risks had a recent onset or change? ambulation;transferring   Prior Functional Level Comment pt reports likes sponge baths; amb household only--occasionally uses cane.    General Information   Onset of Illness/Injury or Date of Surgery - Date 04/27/19   Referring Physician Ros Lopez APRN CNP; Dr Cody Sierra   Patient/Family Goals Statement get stronger to be able to transfer/walk with walker to return home   Pertinent History of Current  Problem (include personal factors and/or comorbidities that impact the POC) 89 year old female with a left intertrochanteric hip fracture following mechanical fall sustained on 4/27/2019. ORIF 4/28PMH: CAD with stents; TVR 2017; A-fib; HTN; UTI   Precautions/Limitations fall precautions   Weight-Bearing Status - LLE toe touch weight-bearing   Heart Disease Risk Factors High blood pressure;Medical history;Age   General Observations pt lying in bed; agreeable to PT   Cognitive Status Examination   Orientation orientation to person, place and time   Level of Consciousness alert   Follows Commands and Answers Questions 75% of the time  (mainly due to [place])   Personal Safety and Judgment intact   Memory intact   Pain Assessment   Patient Currently in Pain No   Integumentary/Edema   Integumentary/Edema Comments post op swelling L hip/thigh; incision lateral thigh covered--no drainage observed   Posture    Posture Forward head position;Protracted shoulders   Range of Motion (ROM)   ROM Comment WFL R LE; L hip limited due to ORIF   Strength   Strength Comments R LE 4/5; L hip 2/5; knee ext 3/5; DF 4/5   Bed Mobility   Bed Mobility Comments CGA/min sit>sup, used rail;  min A for L LE sit>supine;   Transfer Skills   Transfer Comments min A sit<>stand and pivot with ww--cue hand position   Gait   Gait Comments amb with ww 2 ft (half forward/half backward). min A; cue to push down on UE, look ahead. very small step to pattern   Balance   Balance Comments leans back with initial stand--TTWB L;    Sensory Examination   Sensory Perception Comments intact light touch and proprioception LE   Coordination   Coordination Comments decreased L LE due to weakness   Muscle Tone   Muscle Tone no deficits were identified   General Therapy Interventions   Planned Therapy Interventions ADL retraining;bed mobility training;gait training;groups;manual therapy;neuromuscular re-education;ROM;strengthening;transfer training;home program  guidelines;progressive activity/exercise   Clinical Impression   Criteria for Skilled Therapeutic Intervention yes, treatment indicated   PT Diagnosis impaired gait/functional mobility due to L hip ORIF   Influenced by the following impairments weakness, decreased ROM L LE; decreased activity tolerance   Functional limitations due to impairments bed mobility, transfers, gait below prior IND baseline with cane   Clinical Presentation Evolving/Changing   Clinical Presentation Rationale functional mobility assessment; comorbidities; participation limitations   Clinical Decision Making (Complexity) Moderate complexity   Therapy Frequency`   (6 days/wk)   Predicted Duration of Therapy Intervention (days/wks) 5/16/19   Anticipated Equipment Needs at Discharge front wheeled walker  (possibly a ww)   Anticipated Discharge Disposition Home with Assist;Home with Home Therapy   Risk & Benefits of therapy have been explained Yes   Patient, Family & other staff in agreement with plan of care Yes   Clinical Impression Comments pt with L hip fx--ORIF 4/28; limited by TTWB. Previously IND with household amb, occasionally used a cane. Motivated to improve with skilled PT to return home. Pt lives with son but he works days.   Therapy Certification   Start of care date 05/03/19   Certification date from 05/03/19   Certification date to 05/30/19   Medical Diagnosis L hip fx--ORIF   Certification I certify the need for these services furnished under this plan of treatment and while under my care.  (Physician co-signature of this document indicates review and certification of the therapy plan).    Total Evaluation Time   Total Evaluation Time (Minutes) 20

## 2019-05-03 NOTE — PROGRESS NOTES
Transitional Care Unit  Extended Progress Note           Assessment and Plan:   Bernadine Farrell is a 89 year old female with a history of HTN, CAD s/p stenting, aortic stenosis s/p TAVR (2017), paroxysmal Afib, and hypothyroidism admitted to [facility] on 4/27 for left hip pain s/p fall at home.  She was found to have a left intertrochanteric fracture and underwent IMN of left femur. Hospital course complicated by acute blood anemia and urinary tract infection.  She is admitted to  TCU for PT and OT.       # S/p IMN left femur 2/2 left intertrochanteric fracture d/t fall - Pt presented w/ left hip pain after tripping over her shoes.  She was admitted to Trauma Service and underwent IMN w/ Dr. Das on 4/28/19.  Operative course unremarkable.    - Continue pain management w/ PRN APAP, Lidoderm patches, and PRN low dose Oxycodone   - Lovenox 30mg daily x 6 weeks per Ortho  - Continue DAPT   - Toe touch weight bearing on LLE   - Follow up with Dr. Das in Ortho clinic in 2 weeks     # CAD s/p stenting   # Aortic stenosis s/p TAVR (2017)  # Hx paroxysmal Afib   Follows w/ Cardiology at [facility] ([facility]), last seen in clinic on 1/25/19.  On Lipitor, Vasotec, Metoprolol, and Imdur; previously on Amlodipine but appears this was discontinued at last clinic visit.  Echocardiogram performed on 4/29/19 (presumably as part of preop workup given hx of valvular disease), EF 55-60%, no obvious WMAs, good TAVR function.  BPs 123/54 today.    - Continue DAPT w/ ASA and Plavix    - Continue PTA Metoprolol for rate control, hold for SBP < 110, HR < 55   - Will continue Amlodipine for now; recommend further discussion w/ Cards if this should be held  - Will add back Vasotec and Imdur as BP tolerates   - Continue PTA Lipitor   - Monitor BPs   - Follow up with Dr. Key (Cardiology) in June; per clinic note, pt is supposed to wear 48 hr Holter monitor prior to this appointment    # Hypothyroidism - Continue PTA Synthroid 50mcg daily    #  "UTI - UA with large LE, positive nitrite, and 69 WBCs.  UC w/ 10-50K Proteus mirabilis w/ resistance to Macrobid.  Currently finishing last dose of Cipro.  Currently asymptomatic.     Resolved hospital issues:   # Acute blood loss anemia - Hgb drift from 12.0 to 7.7 after surgery.  Improved w/ 2 units PRBCs.  Currently Hgb stable at 10.9 on 5/3.  Monitor CBC q M, Thur.          Discussed with Dr. Adrian Hughes.     Diet and/or tube feedings: regular   Lines, tubes, drains: none   DVT/GI prophylaxis: Lovenox, DAPT  Indications for psychotropic medications: none   Pneumococcal Vaccination Status: no record of any PCV13 or PPSV23 on file   Code status discussed on admission: Full Code      Ros Lopez, CNP, APRN  Internal Medicine [facility] Hospitalist  [facility]  Pager [phone]           Consults:   PT, OT         History of Present Illness:   Bernadine Farrell is a 89 year old female with a history of HTN, CAD s/p stenting, aortic stenosis s/p TAVR (2017), paroxysmal Afib, and hypothyroidism admitted to [facility] on 4/27 for left hip pain s/p fall at home.  She was found to have a left intertrochanteric fracture and underwent IMN of left femur. Hospital course complicated by acute blood anemia.  She is admitted to  TCU for PT and OT.       Currently, Bernadine is resting comfortably in bed.  Her right knee pain is her only complaint.  She is eager to work with therapies and keep moving, states that she is normally much more active that this.  Currently with good appetite and PO intake.  Denies chest pain, dyspnea, abdominal pain, dysuria, and constipation, though states that she doesn't have BMs every day \"because I just don't eat a whole lot\".           Physical Exam:   Blood pressure 146/74, pulse 77, temperature 98.3  F (36.8  C), temperature source Oral, resp. rate 18, weight 53.5 kg (118 lb), SpO2 98 %.    GENERAL: Alert and oriented x 3. Well nourished, well developed.  Petite body " habitus.  No acute distress.    HEENT: Normocephalic, atraumatic. Anicteric sclera. Mucous membranes moist.   CV: RRR. S1, S2. No murmurs appreciated.   RESPIRATORY: Effort normal on room air. Lungs CTAB with no wheezing, rales, or rhonchi.   GI: Abdomen soft and non distended, bowel sounds present x all 4 quadrants. No tenderness, rebound, or guarding.   NEUROLOGICAL: No focal deficits. Follows commands.  Strength equal in upper and lower extremities.   MUSCULOSKELETAL: No joint swelling or tenderness. Moves all extremities.   EXTREMITIES: No gross deformities. Trace edema of LLE from hip to knee.    SKIN: Grossly warm, dry, and intact. No jaundice. No rashes.            Past Medical History:     Past Medical History:   Diagnosis Date     A-fib (H)      Hypertension              Past Surgical History:      Past Surgical History:   Procedure Laterality Date     CARDIAC SURGERY      TARV 2017     OPEN REDUCTION INTERNAL FIXATION RODDING INTRAMEDULLARY FEMUR Left 4/28/2019    Procedure: OPEN REDUCTION INTERNAL FIXATION, FRACTURE, FEMUR, USING INTRAMEDULLARY JESSE;  Surgeon: Kofi Das MD;  Location:  OR             Family History:   No family history on file.          Social History:     Social History     Tobacco Use     Smoking status: Former Smoker     Smokeless tobacco: Never Used   Substance Use Topics     Alcohol use: Never     Frequency: Never        Living situation prior to admission: lives with her son in an apartment         Medications:     No current facility-administered medications on file prior to encounter.   Current Outpatient Medications on File Prior to Encounter:  acetaminophen (TYLENOL) 500 MG tablet Take 2 tablets (1,000 mg) by mouth every 8 hours as needed for mild pain or fever   amLODIPine (NORVASC) 2.5 MG tablet Take 2.5 mg by mouth daily    aspirin (ASA) 81 MG chewable tablet Take 162 mg by mouth daily    atorvastatin (LIPITOR) 10 MG tablet Take 10 mg by mouth daily    calcium  carbonate 500 mg, elemental, (OSCAL;OYSTER SHELL CALCIUM) 500 MG tablet Take 1 tablet (500 mg) by mouth 2 times daily (with meals)   ciprofloxacin (CIPRO) 250 MG tablet Take 1 tablet (250 mg) by mouth every 12 hours   clopidogrel (PLAVIX) 75 MG tablet Take 75 mg by mouth daily    levothyroxine (SYNTHROID/LEVOTHROID) 50 MCG tablet Take 50 mcg by mouth daily    Lidocaine (LIDOCARE) 4 % Patch Place 1 patch onto the skin every 24 hours   metoprolol tartrate (LOPRESSOR) 50 MG tablet Take 25 mg by mouth 2 times daily    multivitamin w/minerals (THERA-VIT-M) tablet Take 1 tablet by mouth daily   oxyCODONE (ROXICODONE) 5 MG tablet Take 0.5-1 tablets (2.5-5 mg) by mouth every 3 hours as needed for moderate to severe pain   polyethylene glycol (MIRALAX/GLYCOLAX) packet Take 17 g by mouth daily as needed for constipation   senna-docusate (SENOKOT-S/PERICOLACE) 8.6-50 MG tablet Take 1-2 tablets by mouth 2 times daily   vitamin D3 1000 units TABS Take 1,000 Units by mouth daily            Allergies:   No Known Allergies          Labs:     ROUTINE IP LABS (Last four results)  CMP   Recent Labs   Lab 05/01/19  1654 05/01/19  0621 04/30/19  0415 04/29/19  0345 04/28/19  1710 04/27/19  2133   NA  --  139 140 135  --  137   POTASSIUM 3.9 3.3* 3.6 3.6  --  3.4   CHLORIDE  --  105 106 105  --  104   CO2  --  26 26 22  --  23   ANIONGAP  --  7 8 8  --  10   GLC  --  115* 122* 114*  --  147*   BUN  --  11 8 14  --  15   CR  --  0.60 0.60 0.75 0.72 0.73   SHANNAN  --  8.3* 8.0* 7.7*  --  8.6   MAG  --  2.1 1.8 1.6  --   --    PHOS  --   --   --  2.3*  --   --      CBC   Recent Labs   Lab 05/01/19  0621 04/30/19  1152 04/30/19  0500 04/30/19  0415  04/29/19  1012 04/29/19  0345   WBC 9.2  --   --  10.2  --  8.3 7.8   RBC 3.70*  --   --  3.85  --  2.69* 2.62*   HGB 10.9* 11.7 11.1* 11.4*   < > 7.9* 7.7*   HCT 33.5*  --   --  34.6*  --  25.8* 25.1*   MCV 91  --   --  90  --  96 96   MCH 29.5  --   --  29.6  --  29.4 29.4   MCHC 32.5  --   --   32.9  --  30.6* 30.7*   RDW 13.9  --   --  13.9  --  13.3 13.4   *  --   --  100*  --  104* 102*    < > = values in this interval not displayed.       INR   Recent Labs   Lab 04/27/19  7724   INR 1.11

## 2019-05-03 NOTE — PROGRESS NOTES
[facility] [place]  Trauma Service Progress Note    Date of Service (when I saw the patient): 05/03/2019     Assessment & Plan   Trauma Mechanism: Ground level fall  Date and time of injury: 04/27/2019  About 1730  Known Injuries:  1. Left intertrochanteric fracture     Other diagnoses:  1. Acute traumatic pain  2. Acute blood loss anemia  3. UTI   4. Hypertension  5. CAD stents on dual antiplatelet therapy, ASA and Plavix  6. Hx TAVR 2017- Aortic stenosis  7. Hx pAfib  8. Hypothyroidism      Procedure(s):  04/28/2019 S/P Intermedullary nailing - Dr. Das  Plan:  1. Tertiary completed with no other obvious injuries noted.   2. Femur fracture:  S/P left femur IMN    Activity status changed to toe touch weight bearing to the left lower extremity due to pain and risk for distal femur erosion     PT/OT    Dressing changed 04/30 by Ortho, scant shadow drainage noted. Compartments soft, +CMS    Calcium and Vit D supplements    Elevated LLE, ice to the left knee to help with swelling and pain    Follow up at [facility] with Dr. Das in 2 weeks for wound check and evaluation in clinic   3. Resp: No acute issues, Pulmonary toilet  4. Neuro/ Pain: Declined head CT on admission. Stable neuro exam    Continue routine neurochecks    Acute pain: Complains mainly of left knee pain, continue scheduled Tylenol, Lidoderm patches, PRN Oxycodone for severe pain. Ice to left knee and elevate    Delirium prevention measures: high risk given advanced age, recent trauma and hospitalization, narcotic medications and pain. Early mobility post op as able, awake during the day as much as able, allow for night time rest, pain control  5. Cardiac:  ECHO obtained 04/29 with stable EF 55-60%, normal bioprosthetic aortic valve doppler function. Denies shortness of breath and chest pain, denies feeling of rapid heart rate or fluttering today     Hx  Hypertension and A.fib: Resume PTA metoprolol now at 25  mg. Resume home amlodipine today. Continue to hold home, Enalapril, Imdur, resume as able.    BMP stable, repleteing K     Resumed ASA and Plavix   6. Heme    Acute blood loss anemia: Stable Hemoglobin dropped from 12-->7.7 post op. Improved after 2units pRBC to 11. Left thigh remains swollen and soft, distal pulses palpable +2, LLE warm to touch, dressing CDI. Documented EBL 100ml. Blood loss is anticipated with long large bone fracture    Transfuse as needed for Hbg <7  7. FEN: Electrolytes replete per protocol   8. GI:  Regular diet  9. : UA/UC 10-50K colonies/mL of proteus mirabillis pan sensitive, some resistance to nitrofurantoin.      Transition to PO Cipro today   10. ID: Afebrile, normal white count, Ceftriaxone as above for UTI  11. Endocrine:     Hypothyroidism: Resume home Levothyroxine    Blood glucose WNL, monitor   12. Palliative care consult: routine geriatric trauma given advanced age, femur fracture.  13. PT/OT consults  14. Social work- discharge planning     General Cares:               PPI/H2 blocker: N/A              DVT prophylaxis: ASA              Bowel Regimen/Date of last stool: 04/30- ordered              Pulmonary toilet: IS, cough and deep breath              Lines / drains: PIV  Discharge goals:     Adequate pain management: ongoing    VSS x24 hours: yes     Hemoglobin stable x 48 hours: Yes     Ambulating safely and/or therapy evals complete: yes    Drains/lines removed or plan in place to manage: yes    Teaching done: ongoing    Other:  Expected D/C date: Today, pending TCU/ARU availability     Interval History   Course reviewed. No acute events. ST in the 100-110's this AM. Increase metoprolol back to home dose. Pain well controlled.   ROS x 8 negative with exception of those things listed in interval hx    Physical Exam   Temp: 98.3  F (36.8  C) Temp src: Oral BP: 146/74 Pulse: 77   Resp: 18 SpO2: 98 % O2 Device: None (Room air)    Vitals:    05/02/19 1730   Weight: 53.5 kg (118  lb)     Vital Signs with Ranges  Temp:  [98  F (36.7  C)-98.3  F (36.8  C)] 98.3  F (36.8  C)  Pulse:  [56-77] 77  Heart Rate:  [88] 88  Resp:  [18] 18  BP: (146-165)/(69-74) 146/74  SpO2:  [98 %-100 %] 98 %  I/O last 3 completed shifts:  In: -   Out: 400 [Urine:400]    [place] Coma Scale - Total 14/15  Eye Response (E): 4   4= spontaneous, 3= to verbal/voice, 2= to pain, 1= No response   Verbal Response (V): 4 confused to location   5= Orientated, converses, 4= Confused, converses, 3= Inappropriate words, 2= Incomprehensible sounds, 1=No response   Motor Response (M): 6   6= Obeys commands, 5= Localizes to pain, 4= Withdrawal to pain, 3=Fexion to pain, 2= Extension to pain, 1= No response   Constitutional: Awake, alert, cooperative, no apparent distress.  Eyes: Lids and lashes normal, pupils equal, round and reactive to light  ENT: Normocephalic, atraumatic  Respiratory: No increased work of breathing, good air exchange, clear to auscultation bilaterally, no crackles or wheezing.  Cardiovascular: SR, regular, normal S1 and S2  GI: Normal bowel sounds, abdomen soft, non-distended, non-tender, no guarding  Genitourinary:  Voids spontaneously, harrison per pure wick output  Skin:  Normal skin color, no redness, warmth, or swelling, no ecchymosis, left thigh incision covered, dressing with scant shadow draiange  Musculoskeletal: Tenderness with palpation of the left distal femur/knee, thigh is soft, no ecchymosis, +2 Pedal pulse palpated, warm to touch  Neurologic: Awake, alert, oriented. Strength and sensory is intact. No focal deficits.  Neuropsychiatric: Calm, normal eye contact, alert, affect appropriate to situation, oriented, thought process normal.    Raciel Marques NP  To contact the trauma service use job code pager [phone],   Numeric texts or alpha text through [facility]

## 2019-05-03 NOTE — PLAN OF CARE
Occupational Therapy Discharge Summary    Reason for therapy discharge:    Discharged to transitional care facility.    Progress towards therapy goal(s). See goals on Care Plan in [facility] electronic health record for goal details.  Goals partially met.  Barriers to achieving goals:   discharge from facility.    Therapy recommendation(s):    Continued therapy is recommended.  Rationale/Recommendations:  Pt will benefit from ongoing therapy to build strength and endurance to increase independence with ADL.

## 2019-05-04 LAB — PLATELET # BLD AUTO: 213 10E9/L (ref 150–450)

## 2019-05-04 PROCEDURE — 97530 THERAPEUTIC ACTIVITIES: CPT | Mod: GP | Performed by: PHYSICAL THERAPIST

## 2019-05-04 PROCEDURE — 12000022 ZZH R&B SNF

## 2019-05-04 PROCEDURE — 97110 THERAPEUTIC EXERCISES: CPT | Mod: GO | Performed by: OCCUPATIONAL THERAPIST

## 2019-05-04 PROCEDURE — 25000132 ZZH RX MED GY IP 250 OP 250 PS 637: Performed by: NURSE PRACTITIONER

## 2019-05-04 PROCEDURE — 85049 AUTOMATED PLATELET COUNT: CPT | Performed by: INTERNAL MEDICINE

## 2019-05-04 PROCEDURE — 99309 SBSQ NF CARE MODERATE MDM 30: CPT | Performed by: INTERNAL MEDICINE

## 2019-05-04 PROCEDURE — 97530 THERAPEUTIC ACTIVITIES: CPT | Mod: GO | Performed by: OCCUPATIONAL THERAPIST

## 2019-05-04 PROCEDURE — 99207 ZZC CDG-HISTORY COMP: MEETS EXP. PROBLEM FOCUSED - DOWN CODED LACK OF HPI: CPT | Performed by: INTERNAL MEDICINE

## 2019-05-04 PROCEDURE — 97535 SELF CARE MNGMENT TRAINING: CPT | Mod: GO | Performed by: OCCUPATIONAL THERAPIST

## 2019-05-04 PROCEDURE — 36415 COLL VENOUS BLD VENIPUNCTURE: CPT | Performed by: INTERNAL MEDICINE

## 2019-05-04 PROCEDURE — 97110 THERAPEUTIC EXERCISES: CPT | Mod: GP | Performed by: PHYSICAL THERAPIST

## 2019-05-04 PROCEDURE — A9270 NON-COVERED ITEM OR SERVICE: HCPCS | Performed by: NURSE PRACTITIONER

## 2019-05-04 RX ADMIN — LEVOTHYROXINE SODIUM 50 MCG: 25 TABLET ORAL at 08:05

## 2019-05-04 RX ADMIN — ACETAMINOPHEN 1000 MG: 500 TABLET ORAL at 22:00

## 2019-05-04 RX ADMIN — RANITIDINE 150 MG: 150 TABLET ORAL at 08:06

## 2019-05-04 RX ADMIN — VITAMIN D, TAB 1000IU (100/BT) 1000 UNITS: 25 TAB at 08:05

## 2019-05-04 RX ADMIN — METOPROLOL TARTRATE 25 MG: 25 TABLET, FILM COATED ORAL at 20:34

## 2019-05-04 RX ADMIN — SENNOSIDES AND DOCUSATE SODIUM 2 TABLET: 8.6; 5 TABLET ORAL at 08:05

## 2019-05-04 RX ADMIN — ATORVASTATIN CALCIUM 10 MG: 10 TABLET, FILM COATED ORAL at 20:34

## 2019-05-04 RX ADMIN — MULTIPLE VITAMINS W/ MINERALS TAB 1 TABLET: TAB at 08:06

## 2019-05-04 RX ADMIN — LIDOCAINE 2 PATCH: 560 PATCH PERCUTANEOUS; TOPICAL; TRANSDERMAL at 08:07

## 2019-05-04 RX ADMIN — ASPIRIN 81 MG CHEWABLE TABLET 162 MG: 81 TABLET CHEWABLE at 08:06

## 2019-05-04 RX ADMIN — AMLODIPINE BESYLATE 2.5 MG: 2.5 TABLET ORAL at 08:06

## 2019-05-04 RX ADMIN — CALCIUM 500 MG: 500 TABLET ORAL at 18:14

## 2019-05-04 RX ADMIN — SENNOSIDES AND DOCUSATE SODIUM 1 TABLET: 8.6; 5 TABLET ORAL at 20:34

## 2019-05-04 RX ADMIN — CLOPIDOGREL BISULFATE 75 MG: 75 TABLET, FILM COATED ORAL at 08:05

## 2019-05-04 RX ADMIN — METOPROLOL TARTRATE 25 MG: 25 TABLET, FILM COATED ORAL at 08:06

## 2019-05-04 RX ADMIN — CALCIUM 500 MG: 500 TABLET ORAL at 08:06

## 2019-05-04 RX ADMIN — RANITIDINE 150 MG: 150 TABLET ORAL at 20:34

## 2019-05-04 NOTE — H&P
H&P by Adrian Hughes MD was reviewed.  Clarified anticoagulation w/ Ortho, will stop Lovenox and continue DAPT with ASA and Plavix. Agree with plan.     Ros Lopez [facility]  Hospitalist Service   Pager: [phone]

## 2019-05-04 NOTE — H&P
[facility]    History and Physical - Hospitalist Service       Date of Admission:  5/2/2019    Assessment & Plan   Bernadine Farrell is a 89 year old female with a history of HTN, CAD s/p stenting, aortic stenosis s/p TAVR (2017), paroxysmal Afib, and hypothyroidism admitted to [facility] on 4/27 for left hip pain s/p fall at home.  She was found to have a left intertrochanteric fracture and underwent IMN of left femur. Hospital course complicated by acute blood anemia and urinary tract infection.  She is admitted to  TCU for PT and OT.        # S/p IMN left femur 2/2 left intertrochanteric fracture d/t fall - Pt presented w/ left hip pain after tripping over her shoes.  She was admitted to Trauma Service and underwent IMN w/ Dr. Das on 4/28/19.  Operative course unremarkable.    - Continue pain management w/ PRN APAP, Lidoderm patches, and PRN low dose Oxycodone   - Lovenox 30mg daily x 6 weeks per Ortho (D/W Ortho to clarify with Dr. Das as patient also on ASA and Plavix)- monitor closely for any bleeding  * addendum- As per Dr. Das no need for lovenox. Just continue ASA and plavix  - Continue DAPT   - Toe touch weight bearing on LLE   - Follow up with Dr. Das in Ortho clinic in 2 weeks      # CAD s/p stenting   # Aortic stenosis s/p TAVR (2017)  # Hx paroxysmal Afib   Follows w/ Cardiology at [facility] ([facility]), last seen in clinic on 1/25/19.  On Lipitor, Vasotec, Metoprolol, and Imdur; previously on Amlodipine but appears this was discontinued at last clinic visit.  Echocardiogram performed on 4/29/19 (presumably as part of preop workup given hx of valvular disease), EF 55-60%, no obvious WMAs, good TAVR function.  BPs 123/54 today.    - Continue DAPT w/ ASA and Plavix    - Continue PTA Metoprolol for rate control, hold for SBP < 110, HR < 55   - Will continue Amlodipine for now;  Will add back Vasotec and Imdur as BP tolerates   - Continue PTA Lipitor   - Monitor BPs   - Follow up with   [facility] (Cardiology) in June; per clinic note, pt is supposed to wear 48 hr Holter monitor prior to this appointment     # Hypothyroidism - Continue PTA Synthroid 50mcg daily     # UTI - UA with large LE, positive nitrite, and 69 WBCs.  UC w/ 10-50K Proteus mirabilis w/ resistance to Macrobid.  Currently finishing last dose of Cipro.  Currently asymptomatic.      Resolved hospital issues:   # Acute blood loss anemia - Hgb drift from 12.0 to 7.7 after surgery.  Improved w/ 2 units PRBCs.  Currently Hgb stable at 10.9 on 5/3.  Monitor CBC q M, Thur.       Diet and/or tube feedings: regular   Lines, tubes, drains: none   DVT/GI prophylaxis: Lovenox, DAPT  Indications for psychotropic medications: none   Pneumococcal Vaccination Status: no record of any PCV13 or PPSV23 on file . Will give PCV 13 for now and have her follow with PMD for PCV23     Code status discussed on admission: Full Code  The patient's care was discussed with the Bedside Nurse, Patient, Ortho Consultant and Ms. Lopez/ [facility].    Adrian Hughes MD, MD  [facility]  ______________________________________________________________________    Chief Complaint   S/p hip Sx    History is obtained from the patient  Review of EMR    History of Present Illness   Bernadine Farrell is a 89 year old female with a history of HTN, CAD s/p stenting, aortic stenosis s/p TAVR (2017), paroxysmal Afib, and hypothyroidism admitted to [facility] on 4/27 for left hip pain s/p fall at home.  She was found to have a left intertrochanteric fracture and underwent IMN of left femur. Hospital course complicated by acute blood anemia.  She is admitted to  TCU for PT and OT.     Today says in general feeling better except for pain in left knee.   Denies Chest pain/ SOB/ cough, Denies any N&V/ bowel problems  Denies urinary problems , Denies fever/chills/rigors     Review of Systems    The 10 point Review of Systems is negative other than noted in the HPI or here.      Past Medical History    I have reviewed this patient's medical history and updated it with pertinent information if needed.   Past Medical History:   Diagnosis Date     A-fib (H)      Hypertension        Past Surgical History   I have reviewed this patient's surgical history and updated it with pertinent information if needed.  Past Surgical History:   Procedure Laterality Date     CARDIAC SURGERY      TARV 2017     OPEN REDUCTION INTERNAL FIXATION RODDING INTRAMEDULLARY FEMUR Left 4/28/2019    Procedure: OPEN REDUCTION INTERNAL FIXATION, FRACTURE, FEMUR, USING INTRAMEDULLARY JESSE;  Surgeon: Kofi Das MD;  Location:  OR     Social History   I have reviewed this patient's social history and updated it with pertinent information if needed.  Social History     Tobacco Use     Smoking status: Former Smoker     Smokeless tobacco: Never Used   Substance Use Topics     Alcohol use: Never     Frequency: Never     Drug use: Never     Family History   I have reviewed this patient's family history and updated it with pertinent information if needed.   No family history on file.    Prior to Admission Medications   Prior to Admission Medications   Prescriptions Last Dose Informant Patient Reported? Taking?   Lidocaine (LIDOCARE) 4 % Patch   No No   Sig: Place 1 patch onto the skin every 24 hours   acetaminophen (TYLENOL) 500 MG tablet   No No   Sig: Take 2 tablets (1,000 mg) by mouth every 8 hours as needed for mild pain or fever   amLODIPine (NORVASC) 2.5 MG tablet   Yes No   Sig: Take 2.5 mg by mouth daily    aspirin (ASA) 81 MG chewable tablet   Yes No   Sig: Take 162 mg by mouth daily    atorvastatin (LIPITOR) 10 MG tablet   Yes No   Sig: Take 10 mg by mouth daily    calcium carbonate 500 mg, elemental, (OSCAL;OYSTER SHELL CALCIUM) 500 MG tablet   No No   Sig: Take 1 tablet (500 mg) by mouth 2 times daily (with meals)   ciprofloxacin (CIPRO) 250 MG tablet   No No   Sig: Take 1 tablet (250 mg) by mouth  every 12 hours   clopidogrel (PLAVIX) 75 MG tablet   Yes No   Sig: Take 75 mg by mouth daily    levothyroxine (SYNTHROID/LEVOTHROID) 50 MCG tablet   Yes No   Sig: Take 50 mcg by mouth daily    metoprolol tartrate (LOPRESSOR) 50 MG tablet   Yes No   Sig: Take 25 mg by mouth 2 times daily    multivitamin w/minerals (THERA-VIT-M) tablet   No No   Sig: Take 1 tablet by mouth daily   oxyCODONE (ROXICODONE) 5 MG tablet   No No   Sig: Take 0.5-1 tablets (2.5-5 mg) by mouth every 3 hours as needed for moderate to severe pain   polyethylene glycol (MIRALAX/GLYCOLAX) packet   No No   Sig: Take 17 g by mouth daily as needed for constipation   senna-docusate (SENOKOT-S/PERICOLACE) 8.6-50 MG tablet   No No   Sig: Take 1-2 tablets by mouth 2 times daily   vitamin D3 1000 units TABS   No No   Sig: Take 1,000 Units by mouth daily      Facility-Administered Medications: None     Allergies   No Known Allergies    Physical Exam   /70 (BP Location: Right arm)   Pulse 90   Temp 97.6  F (36.4  C) (Oral)   Resp 18   Wt 53.5 kg (118 lb)   SpO2 98%   BMI 21.58 kg/m    Gen- pleasant laying on bed  HEENT- NAD, EL  Neck- supple, no JVD elevation, no thyromegaly  CVS- I+II+ no m/r/g  RS- CTAB  Abdo- soft, no tenderness . No g/r/r   Ext- no edema   CNS- no gross focal signs   MSK- left hip dsg in place. Left knee mild swelling. No redness  Data   Data reviewed today: I reviewed all medications, new labs and imaging results over the last 24 hours. I personally reviewed no images or EKG's today.    BMP  Recent Labs   Lab 05/01/19  1654 05/01/19  0621 04/30/19  0415 04/29/19  0345 04/28/19  1710 04/27/19  2133   NA  --  139 140 135  --  137   POTASSIUM 3.9 3.3* 3.6 3.6  --  3.4   CHLORIDE  --  105 106 105  --  104   SHANNAN  --  8.3* 8.0* 7.7*  --  8.6   CO2  --  26 26 22  --  23   BUN  --  11 8 14  --  15   CR  --  0.60 0.60 0.75 0.72 0.73   GLC  --  115* 122* 114*  --  147*     CBC  Recent Labs   Lab 05/04/19  0618 05/01/19  0621   04/30/19  0415  04/29/19  1012 04/29/19  0345   WBC  --  9.2  --  10.2  --  8.3 7.8   RBC  --  3.70*  --  3.85  --  2.69* 2.62*   HGB  --  10.9*   < > 11.4*   < > 7.9* 7.7*   HCT  --  33.5*  --  34.6*  --  25.8* 25.1*   MCV  --  91  --  90  --  96 96   MCH  --  29.5  --  29.6  --  29.4 29.4   MCHC  --  32.5  --  32.9  --  30.6* 30.7*   RDW  --  13.9  --  13.9  --  13.3 13.4    116*  --  100*  --  104* 102*    < > = values in this interval not displayed.     INR  Recent Labs   Lab 04/27/19 2133   INR 1.11     Left knee X-ray :     Impression:   1. No acute fracture.  2. Tricompartmental degenerative changes of the knee.   3. Moderate joint effusion.

## 2019-05-04 NOTE — PLAN OF CARE
Pt is alert and oriented x4. Hard of hearing. Has remained in bed this shift, assisted patient to reposition in bed. TTWB to LLE.  Denies shortness of breath/chest pain. Incontinent of urine, using purewick, however pt still requires freq changes as catheter is not catching all of urine. Discussed other options with pt, to encourage/establish continence, Pt at this time wants to continue to use purewick. Did notice a blanchable redness to left groin area powder and interdry applied. No BM this shift. Denies pain. Dressings to left hip/knee incisions changed. Continue to monitor pt status.

## 2019-05-04 NOTE — PLAN OF CARE
PT agreeable to therapy despite some pain in LLE.  Pt made minimal note of pain during therapy.  Pt limited by weakness and SOB during gait.  Pt to continue to work on ROM, strength and mobility toward discharge goals.

## 2019-05-04 NOTE — PLAN OF CARE
Pt.alert / [place]. Denied pain, discomfort, CP and SOB. Purewick catheter changed @ 0300 - also needed changing with pericares d/t leaking of urine. Cannister currently @ 500mls. Perirectal areas of redness noted - cleansed and applied thin layer criticaid. Also applied to reddened groin.     0630 - Purewick catheter remains intact/patent and cannister now @ 700mls. Pt.awake sitting up in bed and getting ready to order breakfast. Reported sleeping well.

## 2019-05-04 NOTE — PLAN OF CARE
RN: [place] urine  off most of this shift, pt assisted to BSC with 2 staff assist, pivot transfer, TTWB LLE, urinated in BSC x 2 and xl soft bm. No inc this shift. Pt son was here to visit earlier. Lovenox discontinued, see NP note also. Left hip with swelling and bruising, ice used to LLE, pt denies need for po pain meds. Lidoderm patches in place.

## 2019-05-04 NOTE — PHARMACY-MEDICATION REGIMEN REVIEW
Pharmacy Medication Regimen Review  Bernadine Farrell is a 89 year old female who is currently in the Transitional Care Unit.    Assessment: All medications have an appropriate indications, durations and no unnecessary use was found.    Plan:   Continue current treatment  Attending provider will be sent this note for review.  If there are any emergent issues noted above, pharmacist will contact provider directly by phone.      Pharmacy will periodically review the resident's medication regimen for any PRN medications not administered in > 72 hours and discontinue them. The pharmacist will discuss gradual dose reductions of psychopharmacologic medications with interdisciplinary team on a regular basis.    Please contact pharmacy if the above does not answer specific medication questions/concerns.    Background:  A pharmacist has reviewed all medications and pertinent medical history today.  Medications were reviewed for appropriate use and any irregularities found are listed with recommendations.      Current Facility-Administered Medications:      [START ON 5/5/2019] - Skin Test Reading -, , Does not apply, Q21 Days, Ros Lopez APRN CNP     acetaminophen (TYLENOL) Suppository 650 mg, 650 mg, Rectal, Q4H PRN, Ros Lopez APRN CNP     acetaminophen (TYLENOL) tablet 1,000 mg, 1,000 mg, Oral, Q8H PRN, Ros Lopez APRN CNP, 1,000 mg at 05/02/19 2140     amLODIPine (NORVASC) tablet 2.5 mg, 2.5 mg, Oral, Daily, Ros Lopez APRN CNP, 2.5 mg at 05/04/19 0806     aspirin (ASA) chewable tablet 162 mg, 162 mg, Oral, Daily, Ros Lopez APRN CNP, 162 mg at 05/04/19 0806     atorvastatin (LIPITOR) tablet 10 mg, 10 mg, Oral, Daily, Ros Lopez APRN CNP, 10 mg at 05/03/19 2023     calcium carbonate 500 mg (elemental) (OSCAL;OYSTER SHELL CALCIUM) tablet 500 mg, 500 mg, Oral, BID w/meals, Ros Lopez APRN CNP, 500 mg  at 05/04/19 0806     clopidogrel (PLAVIX) tablet 75 mg, 75 mg, Oral, Daily, Ros Lopez APRN CNP, 75 mg at 05/04/19 0805     levothyroxine (SYNTHROID/LEVOTHROID) tablet 50 mcg, 50 mcg, Oral, Daily, Ros Lopez APRN CNP, 50 mcg at 05/04/19 0805     Lidocaine (LIDOCARE) 4 % Patch 2 patch, 2 patch, Transdermal, Q24h, 2 patch at 05/04/19 0807 **AND** lidocaine patch REMOVAL, , Transdermal, Q24H, Stopped at 05/03/19 0805 **AND** lidocaine patch in PLACE, , Transdermal, Q8H, Ros Lopez APRN CNP     medication instructions, , Does not apply, Continuous PRN, Ros Lopez APRN CNP     metoprolol tartrate (LOPRESSOR) tablet 25 mg, 25 mg, Oral, BID, Ros Lopez APRN CNP, 25 mg at 05/04/19 0806     multivitamin w/minerals (THERA-VIT-M) tablet 1 tablet, 1 tablet, Oral, Daily, Ros Lopez APRN CNP, 1 tablet at 05/04/19 0806     naloxone (NARCAN) injection 0.1-0.4 mg, 0.1-0.4 mg, Intravenous, Q2 Min PRN, Adrian Hughes MD     ondansetron (ZOFRAN-ODT) ODT tab 4 mg, 4 mg, Oral, Q6H PRN **OR** ondansetron (ZOFRAN) injection 4 mg, 4 mg, Intravenous, Q6H PRN, Ros Lopez APRN CNP     oxyCODONE IR (ROXICODONE) half-tab 2.5-5 mg, 2.5-5 mg, Oral, Q3H PRN, Ros Lopez APRN CNP     [START ON 5/5/2019] pneumococcal (PREVNAR 13) injection 0.5 mL, 0.5 mL, Intramuscular, Prior to discharge, Adrian Hughes MD     polyethylene glycol (MIRALAX/GLYCOLAX) Packet 17 g, 17 g, Oral, Daily PRN, Ros Lopez APRN CNP     ranitidine (ZANTAC) tablet 150 mg, 150 mg, Oral, BID, Ros Lopez APRN CNP, 150 mg at 05/04/19 0806     senna-docusate (SENOKOT-S/PERICOLACE) 8.6-50 MG per tablet 1-2 tablet, 1-2 tablet, Oral, BID, Ros Lopez APRN CNP, 2 tablet at 05/04/19 0805     tuberculin injection 5 Units, 5 Units, Intradermal, Q21 Days, Ros Lopez APRN CNP, 5 Units at  05/03/19 0807     vitamin D3 (CHOLECALCIFEROL) 1000 units (25 mcg) tablet 1,000 Units, 1,000 Units, Oral, Daily, Ros Lopez, LV CNP, 1,000 Units at 05/04/19 0805  No current outpatient prescriptions on file.   PMH: S/p IMN left femur 2/2 left intertrochanteric fracture d/t fall, CAD s/p stenting, Aortic stenosis s/p TAVR (2017), Hx paroxysmal Afib, hypothyroidism, UTI (asymptomatic, completed a course of cipro), and HTN

## 2019-05-05 PROCEDURE — 12000022 ZZH R&B SNF

## 2019-05-05 PROCEDURE — 25000128 H RX IP 250 OP 636: Performed by: INTERNAL MEDICINE

## 2019-05-05 PROCEDURE — 90670 PCV13 VACCINE IM: CPT | Performed by: INTERNAL MEDICINE

## 2019-05-05 PROCEDURE — A9270 NON-COVERED ITEM OR SERVICE: HCPCS | Performed by: NURSE PRACTITIONER

## 2019-05-05 PROCEDURE — 25000132 ZZH RX MED GY IP 250 OP 250 PS 637: Performed by: NURSE PRACTITIONER

## 2019-05-05 RX ADMIN — CLOPIDOGREL BISULFATE 75 MG: 75 TABLET, FILM COATED ORAL at 08:37

## 2019-05-05 RX ADMIN — PNEUMOCOCCAL 13-VALENT CONJUGATE VACCINE 0.5 ML: 2.2; 2.2; 2.2; 2.2; 2.2; 4.4; 2.2; 2.2; 2.2; 2.2; 2.2; 2.2; 2.2 INJECTION, SUSPENSION INTRAMUSCULAR at 15:00

## 2019-05-05 RX ADMIN — CALCIUM 500 MG: 500 TABLET ORAL at 08:37

## 2019-05-05 RX ADMIN — METOPROLOL TARTRATE 25 MG: 25 TABLET, FILM COATED ORAL at 08:37

## 2019-05-05 RX ADMIN — RANITIDINE 150 MG: 150 TABLET ORAL at 08:36

## 2019-05-05 RX ADMIN — LIDOCAINE 2 PATCH: 560 PATCH PERCUTANEOUS; TOPICAL; TRANSDERMAL at 08:35

## 2019-05-05 RX ADMIN — MULTIPLE VITAMINS W/ MINERALS TAB 1 TABLET: TAB at 08:37

## 2019-05-05 RX ADMIN — AMLODIPINE BESYLATE 2.5 MG: 2.5 TABLET ORAL at 08:37

## 2019-05-05 RX ADMIN — LEVOTHYROXINE SODIUM 50 MCG: 25 TABLET ORAL at 08:37

## 2019-05-05 RX ADMIN — METOPROLOL TARTRATE 25 MG: 25 TABLET, FILM COATED ORAL at 20:28

## 2019-05-05 RX ADMIN — RANITIDINE 150 MG: 150 TABLET ORAL at 20:28

## 2019-05-05 RX ADMIN — ASPIRIN 81 MG CHEWABLE TABLET 162 MG: 81 TABLET CHEWABLE at 08:37

## 2019-05-05 RX ADMIN — ATORVASTATIN CALCIUM 10 MG: 10 TABLET, FILM COATED ORAL at 20:28

## 2019-05-05 RX ADMIN — VITAMIN D, TAB 1000IU (100/BT) 1000 UNITS: 25 TAB at 08:36

## 2019-05-05 RX ADMIN — CALCIUM 500 MG: 500 TABLET ORAL at 17:19

## 2019-05-05 NOTE — PLAN OF CARE
Pt.A&Ox4 / [place]. Sleeping well. Has no c/o's pain, discomfort, CP and SOB. Uses call light appropriately. Called x1 for assist to BSC - requested female caregiver. Voided lrg.amount. No incontinence this shift. LLE dsgs C/D/I. TTWB.

## 2019-05-05 NOTE — PLAN OF CARE
RN: Using ice packs and pain patches for LLE pain control, effective, continue to monitor.  Pt birthday today.     Up to BSC with 1-2 assist pivot transfer, no urinary inc noted. Prevnar 13 given right deltoid.

## 2019-05-06 LAB
ANION GAP SERPL CALCULATED.3IONS-SCNC: 7 MMOL/L (ref 3–14)
BUN SERPL-MCNC: 15 MG/DL (ref 7–30)
CALCIUM SERPL-MCNC: 8.6 MG/DL (ref 8.5–10.1)
CHLORIDE SERPL-SCNC: 102 MMOL/L (ref 94–109)
CO2 SERPL-SCNC: 28 MMOL/L (ref 20–32)
CREAT SERPL-MCNC: 0.69 MG/DL (ref 0.52–1.04)
ERYTHROCYTE [DISTWIDTH] IN BLOOD BY AUTOMATED COUNT: 14 % (ref 10–15)
GFR SERPL CREATININE-BSD FRML MDRD: 76 ML/MIN/{1.73_M2}
GLUCOSE SERPL-MCNC: 118 MG/DL (ref 70–99)
HCT VFR BLD AUTO: 35.5 % (ref 35–47)
HGB BLD-MCNC: 11.2 G/DL (ref 11.7–15.7)
MCH RBC QN AUTO: 29.2 PG (ref 26.5–33)
MCHC RBC AUTO-ENTMCNC: 31.5 G/DL (ref 31.5–36.5)
MCV RBC AUTO: 93 FL (ref 78–100)
PLATELET # BLD AUTO: 331 10E9/L (ref 150–450)
POTASSIUM SERPL-SCNC: 3.8 MMOL/L (ref 3.4–5.3)
RBC # BLD AUTO: 3.83 10E12/L (ref 3.8–5.2)
SODIUM SERPL-SCNC: 137 MMOL/L (ref 133–144)
WBC # BLD AUTO: 8.3 10E9/L (ref 4–11)

## 2019-05-06 PROCEDURE — 97530 THERAPEUTIC ACTIVITIES: CPT | Mod: GP

## 2019-05-06 PROCEDURE — A9270 NON-COVERED ITEM OR SERVICE: HCPCS | Performed by: NURSE PRACTITIONER

## 2019-05-06 PROCEDURE — 85027 COMPLETE CBC AUTOMATED: CPT | Performed by: NURSE PRACTITIONER

## 2019-05-06 PROCEDURE — 80048 BASIC METABOLIC PNL TOTAL CA: CPT | Performed by: NURSE PRACTITIONER

## 2019-05-06 PROCEDURE — 12000022 ZZH R&B SNF

## 2019-05-06 PROCEDURE — 97535 SELF CARE MNGMENT TRAINING: CPT | Mod: GO

## 2019-05-06 PROCEDURE — 36415 COLL VENOUS BLD VENIPUNCTURE: CPT | Performed by: NURSE PRACTITIONER

## 2019-05-06 PROCEDURE — 97110 THERAPEUTIC EXERCISES: CPT | Mod: GP

## 2019-05-06 PROCEDURE — 25000132 ZZH RX MED GY IP 250 OP 250 PS 637: Performed by: NURSE PRACTITIONER

## 2019-05-06 RX ADMIN — LIDOCAINE 2 PATCH: 560 PATCH PERCUTANEOUS; TOPICAL; TRANSDERMAL at 09:36

## 2019-05-06 RX ADMIN — LEVOTHYROXINE SODIUM 50 MCG: 25 TABLET ORAL at 09:36

## 2019-05-06 RX ADMIN — ASPIRIN 81 MG CHEWABLE TABLET 162 MG: 81 TABLET CHEWABLE at 09:35

## 2019-05-06 RX ADMIN — VITAMIN D, TAB 1000IU (100/BT) 1000 UNITS: 25 TAB at 09:36

## 2019-05-06 RX ADMIN — RANITIDINE 150 MG: 150 TABLET ORAL at 09:36

## 2019-05-06 RX ADMIN — CALCIUM 500 MG: 500 TABLET ORAL at 17:26

## 2019-05-06 RX ADMIN — ATORVASTATIN CALCIUM 10 MG: 10 TABLET, FILM COATED ORAL at 20:44

## 2019-05-06 RX ADMIN — CALCIUM 500 MG: 500 TABLET ORAL at 09:36

## 2019-05-06 RX ADMIN — RANITIDINE 150 MG: 150 TABLET ORAL at 20:44

## 2019-05-06 RX ADMIN — ACETAMINOPHEN 1000 MG: 500 TABLET ORAL at 09:51

## 2019-05-06 RX ADMIN — MULTIPLE VITAMINS W/ MINERALS TAB 1 TABLET: TAB at 09:35

## 2019-05-06 RX ADMIN — CLOPIDOGREL BISULFATE 75 MG: 75 TABLET, FILM COATED ORAL at 09:36

## 2019-05-06 ASSESSMENT — PAIN DESCRIPTION - DESCRIPTORS: DESCRIPTORS: DISCOMFORT

## 2019-05-06 NOTE — PLAN OF CARE
Patient is alert x 4 but is [place]. Bed alarm on.  B/p running low this am with no symptoms ( 91/61).  Patient medicated x 1 with Tylenol 1000 mg po for left hip pain.   Dressing to Left hip CDI. Lidoderm pain patches one on Left knee and Left hip. Patient had 2 BMs yesterday so she didn't want her bowel medicaitons this am.

## 2019-05-06 NOTE — PLAN OF CARE
The patient is alert and oriented x 3. VSS. Denies pain or discomfort. Appetite is good. Up with assist of 2 to the bedside commode. New dressing to right hip incision. Staples are well approximated. Continue to monitor and encourage to call for assist.

## 2019-05-06 NOTE — PLAN OF CARE
No acute issues or concerns overnight. A&Ox4 / [place]. Sleeping well and has no c/o's pain, discomfort, CP and SOB. Uses call light appropriately. Assist of 1 to BSC - continent of urine. Dsgs to LLE C/D/I.

## 2019-05-06 NOTE — PLAN OF CARE
PTA: Pt very [place]. Has difficulty maintaining TTWB with LLE during transfers and short distance amb. SBA with bed mob this afternoon, CGA with amb and transfers.

## 2019-05-06 NOTE — PLAN OF CARE
Discharge Planner OT   Patient plan for discharge: home  Current status: Pt limited by knee pain, using AE for LB dressing.  Pt inconsistent with TTWB, needing maximal cues and min to max A to maintain during sit<>stand and transfers.  Recommend continued Ax2 for transfer 2/2 inconsistency with TTWB restriction.    Barriers to return to prior living situation: TTWB L LE, impaired ADL and mobility, decreased reach to feet  Recommendations for discharge: home, continue PT/OT  Rationale for recommendations: to maximize safety and I with ADL       Entered by: Helen Chen 05/06/2019 8:45 AM

## 2019-05-07 PROCEDURE — A9270 NON-COVERED ITEM OR SERVICE: HCPCS | Performed by: NURSE PRACTITIONER

## 2019-05-07 PROCEDURE — 97116 GAIT TRAINING THERAPY: CPT | Mod: GP

## 2019-05-07 PROCEDURE — 25000132 ZZH RX MED GY IP 250 OP 250 PS 637: Performed by: NURSE PRACTITIONER

## 2019-05-07 PROCEDURE — 97530 THERAPEUTIC ACTIVITIES: CPT | Mod: GP

## 2019-05-07 PROCEDURE — 12000022 ZZH R&B SNF

## 2019-05-07 PROCEDURE — 97535 SELF CARE MNGMENT TRAINING: CPT | Mod: GO | Performed by: OCCUPATIONAL THERAPIST

## 2019-05-07 PROCEDURE — 97110 THERAPEUTIC EXERCISES: CPT | Mod: GO | Performed by: OCCUPATIONAL THERAPIST

## 2019-05-07 RX ADMIN — MULTIPLE VITAMINS W/ MINERALS TAB 1 TABLET: TAB at 08:41

## 2019-05-07 RX ADMIN — ATORVASTATIN CALCIUM 10 MG: 10 TABLET, FILM COATED ORAL at 20:34

## 2019-05-07 RX ADMIN — CALCIUM 500 MG: 500 TABLET ORAL at 08:41

## 2019-05-07 RX ADMIN — CLOPIDOGREL BISULFATE 75 MG: 75 TABLET, FILM COATED ORAL at 08:43

## 2019-05-07 RX ADMIN — ASPIRIN 81 MG CHEWABLE TABLET 162 MG: 81 TABLET CHEWABLE at 08:41

## 2019-05-07 RX ADMIN — LEVOTHYROXINE SODIUM 50 MCG: 25 TABLET ORAL at 08:43

## 2019-05-07 RX ADMIN — METOPROLOL TARTRATE 25 MG: 25 TABLET, FILM COATED ORAL at 20:34

## 2019-05-07 RX ADMIN — RANITIDINE 150 MG: 150 TABLET ORAL at 08:43

## 2019-05-07 RX ADMIN — VITAMIN D, TAB 1000IU (100/BT) 1000 UNITS: 25 TAB at 08:42

## 2019-05-07 RX ADMIN — LIDOCAINE 2 PATCH: 560 PATCH PERCUTANEOUS; TOPICAL; TRANSDERMAL at 11:03

## 2019-05-07 RX ADMIN — CALCIUM 500 MG: 500 TABLET ORAL at 17:06

## 2019-05-07 RX ADMIN — RANITIDINE 150 MG: 150 TABLET ORAL at 20:34

## 2019-05-07 NOTE — PLAN OF CARE
PT: Training for transfers and ambulation while adhering to TTWB on L LE, pt benefited from cues for more of a NWB technique with better adherence to precautions and able to demo safe transfers and ambulation up to 10' with FWW and CGA. Cont with POC.

## 2019-05-07 NOTE — PROGRESS NOTES
05/07/19 1300   General Information   Patient Profile Review See Profile for full history and prior level of function   Daily Contact with Relatives or Friends Phone call;Visit   Pets Other (see comments)  (Grew up with many pets but not permitted in current apt.)   Observations Pt hard of hearing. Pt pleasant and alert supine in bed upon arrival.   Community Involvement   Sees [facility] ? No   Hobbies/Interests   Word Puzzles Other (see comments)  (enjoys puzzles at home)   Music   Listens to Recorded Music Yes   Brought Own Equipment No   Media   Newspapers Daily;Other (comments)  (Son brings in daily paper)   TV / Movies TV   Reading Books;Has own reading materials   Sports / Physical Activities   Sports/Exercise Walks   Sports Fan Baseball   Impression   Open to Socializing with Others Independent   Barriers to Leisure No barriers / independent   Patient, family and / or staff in agreement with Plan of Care Yes   Treatment Plan   Type of Intervention Independent with activity;1:1 intervention;Structured groups   One to One Therapeutic Intervention Hand massage   Equipment and Supplies While on Unit None needed   Assessment Assessment completed. Pt intersted in 1:1 intervention of hand massage. Will invite patient to flower group.

## 2019-05-07 NOTE — PLAN OF CARE
Pt VSS. Mild pain managed with ice packs to knee. Incision dressings changed, no drainage noted, incision approximated. Pt pivot transfer with A1. Pt [place], a/o. Calls appropriately.

## 2019-05-07 NOTE — PLAN OF CARE
RN: Pt alert and oriented. Very [place]. Admit to some pain on L thigh but denies need for PRN pain med.  Dressings CDI. A of 1 to BSC with walker x 2. Staff provided brittney-cares. Appear to be sleeping/resting between cares. Continue with plan of care.

## 2019-05-07 NOTE — PLAN OF CARE
Discharge Planner OT   Patient plan for discharge: not discussed at time of session  Current status: pt. Polly using A.E. to A with LB dressing. Pt. ModA with standing/clothing manag. A mostly for balance/maintaining TTWB restriction.   Barriers to return to prior living situation: TTWB, limited activity layla.  Recommendations for discharge: home with son pending continued progess, HHC prn at time of d/c  Rationale for recommendations: increase activity layla. To max. safety/indep. with ADLs/mobility in home/community setting.       Entered by: Honey Delvalle 05/07/2019 7:57 AM

## 2019-05-07 NOTE — PLAN OF CARE
Pt alert and oriented, pleasant and cooperative. [place]. Up with SBA & walker. No pain or discomfort noted/reported. Soft BP at HS, metoprolol per parameter. Dressings CDI to left hip/knee. Call in reach.

## 2019-05-08 PROCEDURE — 97530 THERAPEUTIC ACTIVITIES: CPT | Mod: GO | Performed by: OCCUPATIONAL THERAPIST

## 2019-05-08 PROCEDURE — 97110 THERAPEUTIC EXERCISES: CPT | Mod: GP | Performed by: PHYSICAL THERAPIST

## 2019-05-08 PROCEDURE — 97530 THERAPEUTIC ACTIVITIES: CPT | Mod: GP | Performed by: PHYSICAL THERAPIST

## 2019-05-08 PROCEDURE — 25000132 ZZH RX MED GY IP 250 OP 250 PS 637: Performed by: NURSE PRACTITIONER

## 2019-05-08 PROCEDURE — 97535 SELF CARE MNGMENT TRAINING: CPT | Mod: GO | Performed by: OCCUPATIONAL THERAPIST

## 2019-05-08 PROCEDURE — A9270 NON-COVERED ITEM OR SERVICE: HCPCS | Performed by: NURSE PRACTITIONER

## 2019-05-08 PROCEDURE — 97116 GAIT TRAINING THERAPY: CPT | Mod: GP | Performed by: PHYSICAL THERAPIST

## 2019-05-08 PROCEDURE — 12000022 ZZH R&B SNF

## 2019-05-08 RX ADMIN — RANITIDINE 150 MG: 150 TABLET ORAL at 08:24

## 2019-05-08 RX ADMIN — CALCIUM 500 MG: 500 TABLET ORAL at 17:42

## 2019-05-08 RX ADMIN — ACETAMINOPHEN 1000 MG: 500 TABLET ORAL at 08:32

## 2019-05-08 RX ADMIN — MULTIPLE VITAMINS W/ MINERALS TAB 1 TABLET: TAB at 08:25

## 2019-05-08 RX ADMIN — CLOPIDOGREL BISULFATE 75 MG: 75 TABLET, FILM COATED ORAL at 08:24

## 2019-05-08 RX ADMIN — LEVOTHYROXINE SODIUM 50 MCG: 25 TABLET ORAL at 08:24

## 2019-05-08 RX ADMIN — VITAMIN D, TAB 1000IU (100/BT) 1000 UNITS: 25 TAB at 08:25

## 2019-05-08 RX ADMIN — CALCIUM 500 MG: 500 TABLET ORAL at 08:23

## 2019-05-08 RX ADMIN — LIDOCAINE 2 PATCH: 560 PATCH PERCUTANEOUS; TOPICAL; TRANSDERMAL at 08:22

## 2019-05-08 RX ADMIN — ASPIRIN 81 MG CHEWABLE TABLET 162 MG: 81 TABLET CHEWABLE at 08:23

## 2019-05-08 RX ADMIN — RANITIDINE 150 MG: 150 TABLET ORAL at 20:45

## 2019-05-08 RX ADMIN — ATORVASTATIN CALCIUM 10 MG: 10 TABLET, FILM COATED ORAL at 20:45

## 2019-05-08 NOTE — PLAN OF CARE
Left hip dressing CDI.Pain patches in place on the same hip.Tylenol given x 1 per patient request with relief.SBA with a walker to bedside comode,voiding okay.

## 2019-05-08 NOTE — PLAN OF CARE
Patient is alert and oriented. She is able to communicate needs. No complain of pain this shift. Transfers with assist of one from bed to bedside commode. Continent of both bowel and bladder. Incision on left hip covered with dry dressings. Denies any new concerns/complains. Staff to continue to monitor.

## 2019-05-08 NOTE — PLAN OF CARE
OT: Pt. Does have difficulty maintaining TTWB with transfers. Pt. Did better with shoe donned on right LE during standing activity.

## 2019-05-08 NOTE — PROGRESS NOTES
"CLINICAL NUTRITION SERVICES - ASSESSMENT NOTE     Nutrition Prescription    RECOMMENDATIONS FOR MDs/PROVIDERS TO ORDER:  None at this time    Malnutrition Status:    Patient does not meet two of the above criteria necessary for diagnosing malnutrition but is at risk for malnutrition    Recommendations already ordered by Registered Dietitian (RD):  None at this time    Future/Additional Recommendations:  1. Monitor PO and wt trends  2. Encourage high protein/kcal foods  3. Offer nutrition supplements      REASON FOR ASSESSMENT  Bernadine Farrell is a/an 89 year old female assessed by the dietitian for LOS    NUTRITION HISTORY  Pt reports usually eating 3 meals a day at home w/ occasional snacks. Says she has Boost at home that she will sometimes drink but not regularly.   Her and her son will both cook. She eats food like spaghetti, goulash, fruits and vegetables.     CURRENT NUTRITION ORDERS  Diet: Regular  Intake/Tolerance: Per RN flowsheet intake of %. Has been ordering 3 meals a day, average meal order per day over the last 5 days provides 1133 calories (76% assessed needs) and 45 g protein (61% assessed protein needs). Says her appetite is so so she feels ok but prefers her own cooking. Pt says she tries to eat food vs having Boost.     Pt does not have any food restrictions or preferences that severely limit their menu options and feel their menu choices are adequate.     LABS  Labs reviewed  Vitamin D 12 (L)    MEDICATIONS  Medications reviewed  Calcium carbonate- 500 mg  Vitamin D3- 1,000 units  Thera-vit-m    ANTHROPOMETRICS  Height: 5' 2\"  Most Recent Weight: 53.5 kg (118 lb)    IBW: 50 kg (107%)  BMI: Normal BMI  Weight History: Pt reports a UBW of ~110-112 lbs. No wt loss recorded.   Wt Readings from Last 10 Encounters:   05/02/19 53.5 kg (118 lb)   05/01/19 54.4 kg (119 lb 14.9 oz)     05/01/19 0448 54.4 kg (119 lb 14.9 oz) LW   04/27/19 1919 49.9 kg (110 lb) LH       Dosing Weight: 54 " kg    ASSESSED NUTRITION NEEDS  Estimated Energy Needs: 8999-4104 kcals/day (25 - 30 kcals/kg)  Justification: Maintenance  Estimated Protein Needs: 65-81 grams protein/day (1.2 - 1.5 grams of pro/kg)  Justification: Preservation of LBM and Post-op  Estimated Fluid Needs: (1 mL/kcal)   Justification: Maintenance    PHYSICAL FINDINGS  See malnutrition section below.    MALNUTRITION  % Intake: Decreased intake does not meet criteria  % Weight Loss: None noted  Subcutaneous Fat Loss: None observed  Muscle Loss: Facial & jaw region, Thoracic region (clavicle, acromium bone, deltoid, trapezius, pectoral) and Upper arm (bicep, tricep):  Mild - consistent with aging  Fluid Accumulation/Edema: None noted  Malnutrition Diagnosis: Patient does not meet two of the above criteria necessary for diagnosing malnutrition but is at risk for malnutrition    NUTRITION DIAGNOSIS  Inadequate protein intake related to increased needs post op with ok appetite as evidenced by patients meals only providing 61% assessed protein needs per meal ordering system.     INTERVENTIONS  Implementation  Nutrition Education: Discussed importance of adequate protein and foods high in protein and calories. Offered ONS and pt declined at this time.     Goals  Patient to consume % of nutritionally adequate meal trays TID, or the equivalent with supplements/snacks.     Monitoring/Evaluation  Progress toward goals will be monitored and evaluated per protocol.    Jessica Yates, Dietetic Intern

## 2019-05-08 NOTE — PLAN OF CARE
Pt alert and oriented. Able to make needs known. Hard of hearing. Denies pain, denies SOB. Dressings CDI. Pivot transfers to BSC with assist of one and a walker. Had a medium bm. Call light within reach. Continue with POC.

## 2019-05-09 LAB
ANION GAP SERPL CALCULATED.3IONS-SCNC: 7 MMOL/L (ref 3–14)
BUN SERPL-MCNC: 13 MG/DL (ref 7–30)
CALCIUM SERPL-MCNC: 8.5 MG/DL (ref 8.5–10.1)
CHLORIDE SERPL-SCNC: 104 MMOL/L (ref 94–109)
CO2 SERPL-SCNC: 27 MMOL/L (ref 20–32)
CREAT SERPL-MCNC: 0.71 MG/DL (ref 0.52–1.04)
ERYTHROCYTE [DISTWIDTH] IN BLOOD BY AUTOMATED COUNT: 13.9 % (ref 10–15)
GFR SERPL CREATININE-BSD FRML MDRD: 74 ML/MIN/{1.73_M2}
GLUCOSE SERPL-MCNC: 101 MG/DL (ref 70–99)
HCT VFR BLD AUTO: 33.7 % (ref 35–47)
HGB BLD-MCNC: 10.7 G/DL (ref 11.7–15.7)
MCH RBC QN AUTO: 29.3 PG (ref 26.5–33)
MCHC RBC AUTO-ENTMCNC: 31.8 G/DL (ref 31.5–36.5)
MCV RBC AUTO: 92 FL (ref 78–100)
PLATELET # BLD AUTO: 410 10E9/L (ref 150–450)
POTASSIUM SERPL-SCNC: 3.8 MMOL/L (ref 3.4–5.3)
RBC # BLD AUTO: 3.65 10E12/L (ref 3.8–5.2)
SODIUM SERPL-SCNC: 138 MMOL/L (ref 133–144)
WBC # BLD AUTO: 9.3 10E9/L (ref 4–11)

## 2019-05-09 PROCEDURE — 97116 GAIT TRAINING THERAPY: CPT | Mod: GP

## 2019-05-09 PROCEDURE — 00220000 ZZH SNF RUG CODE OPNP

## 2019-05-09 PROCEDURE — 85027 COMPLETE CBC AUTOMATED: CPT | Performed by: NURSE PRACTITIONER

## 2019-05-09 PROCEDURE — 97110 THERAPEUTIC EXERCISES: CPT | Mod: GO | Performed by: STUDENT IN AN ORGANIZED HEALTH CARE EDUCATION/TRAINING PROGRAM

## 2019-05-09 PROCEDURE — 25000132 ZZH RX MED GY IP 250 OP 250 PS 637: Performed by: NURSE PRACTITIONER

## 2019-05-09 PROCEDURE — 80048 BASIC METABOLIC PNL TOTAL CA: CPT | Performed by: NURSE PRACTITIONER

## 2019-05-09 PROCEDURE — 97110 THERAPEUTIC EXERCISES: CPT | Mod: GP

## 2019-05-09 PROCEDURE — 97535 SELF CARE MNGMENT TRAINING: CPT | Mod: GO | Performed by: STUDENT IN AN ORGANIZED HEALTH CARE EDUCATION/TRAINING PROGRAM

## 2019-05-09 PROCEDURE — 12000022 ZZH R&B SNF

## 2019-05-09 PROCEDURE — A9270 NON-COVERED ITEM OR SERVICE: HCPCS | Performed by: NURSE PRACTITIONER

## 2019-05-09 PROCEDURE — 36415 COLL VENOUS BLD VENIPUNCTURE: CPT | Performed by: NURSE PRACTITIONER

## 2019-05-09 PROCEDURE — 97530 THERAPEUTIC ACTIVITIES: CPT | Mod: GP

## 2019-05-09 RX ADMIN — RANITIDINE 150 MG: 150 TABLET ORAL at 08:55

## 2019-05-09 RX ADMIN — LIDOCAINE 2 PATCH: 560 PATCH PERCUTANEOUS; TOPICAL; TRANSDERMAL at 08:57

## 2019-05-09 RX ADMIN — ACETAMINOPHEN 1000 MG: 500 TABLET ORAL at 17:07

## 2019-05-09 RX ADMIN — CLOPIDOGREL BISULFATE 75 MG: 75 TABLET, FILM COATED ORAL at 08:56

## 2019-05-09 RX ADMIN — ASPIRIN 81 MG CHEWABLE TABLET 162 MG: 81 TABLET CHEWABLE at 08:54

## 2019-05-09 RX ADMIN — CALCIUM 500 MG: 500 TABLET ORAL at 08:55

## 2019-05-09 RX ADMIN — CALCIUM 500 MG: 500 TABLET ORAL at 17:07

## 2019-05-09 RX ADMIN — ATORVASTATIN CALCIUM 10 MG: 10 TABLET, FILM COATED ORAL at 20:16

## 2019-05-09 RX ADMIN — LEVOTHYROXINE SODIUM 50 MCG: 25 TABLET ORAL at 08:55

## 2019-05-09 RX ADMIN — MULTIPLE VITAMINS W/ MINERALS TAB 1 TABLET: TAB at 08:55

## 2019-05-09 RX ADMIN — VITAMIN D, TAB 1000IU (100/BT) 1000 UNITS: 25 TAB at 08:55

## 2019-05-09 RX ADMIN — RANITIDINE 150 MG: 150 TABLET ORAL at 20:17

## 2019-05-09 RX ADMIN — METOPROLOL TARTRATE 25 MG: 25 TABLET, FILM COATED ORAL at 20:17

## 2019-05-09 NOTE — PLAN OF CARE
Pt is alert and oriented. Pt denied pain. Transfers stand by assist with walker and gait belt. Dressing changed to left hip incision. Area clean, dry, staples and sutures present and intact.   Denies any other concerns/complaints. Staff will continue to monitor.

## 2019-05-09 NOTE — PLAN OF CARE
SPTA: Pt left in w/c in room c call lights on for bathroom use and pain meds at end of session. Pt able to manage/maintain TTWB better with continued VCs. CGA with amb <10 ft x1 using ww, down and back in parallel bars x2.

## 2019-05-09 NOTE — PLAN OF CARE
"Pt is alert and oriented, able to communicate needs appropriately but very [place]. Ambulated to the bathroom with a walker and SBA, gait is slow but steady, needs reminder to maintain TTWB on the LLE. Had a small BM and voided in the toilet, no incontinence episode noted. Blanchable redness observed on bilateral groin and sacral area but more prominent on the left groin, barrier cream applied after cleansing. Pt prefers a female to provide perineal cares, stated \"Thank you for helping me, I don't wonna go in there with a man\". Left hip incisions with sutures and staples are intact, no drainage observed, new dressings applied to all 3 incisions. Continue with POC.   "

## 2019-05-09 NOTE — PROGRESS NOTES
PHQ9 and BIMS:     BIMS: Pt scored 12 /15  PHQ9: Pt scored 02 showing minimal depressive symptoms.    ASHLI Diamond  [facility]   P: [phone]  Pgr: [phone]

## 2019-05-10 LAB — GLUCOSE BLDC GLUCOMTR-MCNC: 91 MG/DL (ref 70–99)

## 2019-05-10 PROCEDURE — 99308 SBSQ NF CARE LOW MDM 20: CPT | Performed by: PHYSICIAN ASSISTANT

## 2019-05-10 PROCEDURE — 97110 THERAPEUTIC EXERCISES: CPT | Mod: GP

## 2019-05-10 PROCEDURE — 00000146 ZZHCL STATISTIC GLUCOSE BY METER IP

## 2019-05-10 PROCEDURE — 25000132 ZZH RX MED GY IP 250 OP 250 PS 637: Performed by: NURSE PRACTITIONER

## 2019-05-10 PROCEDURE — A9270 NON-COVERED ITEM OR SERVICE: HCPCS | Performed by: NURSE PRACTITIONER

## 2019-05-10 PROCEDURE — 97116 GAIT TRAINING THERAPY: CPT | Mod: GP

## 2019-05-10 PROCEDURE — 97530 THERAPEUTIC ACTIVITIES: CPT | Mod: GP

## 2019-05-10 PROCEDURE — 97535 SELF CARE MNGMENT TRAINING: CPT | Mod: GO | Performed by: OCCUPATIONAL THERAPIST

## 2019-05-10 PROCEDURE — 12000022 ZZH R&B SNF

## 2019-05-10 RX ADMIN — ACETAMINOPHEN 1000 MG: 500 TABLET ORAL at 08:45

## 2019-05-10 RX ADMIN — LEVOTHYROXINE SODIUM 50 MCG: 25 TABLET ORAL at 08:45

## 2019-05-10 RX ADMIN — METOPROLOL TARTRATE 25 MG: 25 TABLET, FILM COATED ORAL at 20:52

## 2019-05-10 RX ADMIN — ASPIRIN 81 MG CHEWABLE TABLET 162 MG: 81 TABLET CHEWABLE at 08:45

## 2019-05-10 RX ADMIN — ACETAMINOPHEN 1000 MG: 500 TABLET ORAL at 20:53

## 2019-05-10 RX ADMIN — CLOPIDOGREL BISULFATE 75 MG: 75 TABLET, FILM COATED ORAL at 08:45

## 2019-05-10 RX ADMIN — RANITIDINE 150 MG: 150 TABLET ORAL at 20:53

## 2019-05-10 RX ADMIN — METOPROLOL TARTRATE 25 MG: 25 TABLET, FILM COATED ORAL at 08:46

## 2019-05-10 RX ADMIN — CALCIUM 500 MG: 500 TABLET ORAL at 08:45

## 2019-05-10 RX ADMIN — CALCIUM 500 MG: 500 TABLET ORAL at 17:13

## 2019-05-10 RX ADMIN — ATORVASTATIN CALCIUM 10 MG: 10 TABLET, FILM COATED ORAL at 20:52

## 2019-05-10 RX ADMIN — VITAMIN D, TAB 1000IU (100/BT) 1000 UNITS: 25 TAB at 08:45

## 2019-05-10 RX ADMIN — RANITIDINE 150 MG: 150 TABLET ORAL at 08:45

## 2019-05-10 RX ADMIN — LIDOCAINE 2 PATCH: 560 PATCH PERCUTANEOUS; TOPICAL; TRANSDERMAL at 08:44

## 2019-05-10 RX ADMIN — MULTIPLE VITAMINS W/ MINERALS TAB 1 TABLET: TAB at 08:45

## 2019-05-10 RX ADMIN — AMLODIPINE BESYLATE 2.5 MG: 2.5 TABLET ORAL at 08:46

## 2019-05-10 NOTE — PLAN OF CARE
RN: Pt denies any pain or discomfort this shift. A of 1 to BR with walker x 3. Able to maintain TTWB. Staff provided brittney-cares. A of 1 to lift affected leg to bed. Appear to be sleeping/resting . Continue with plan of care.

## 2019-05-10 NOTE — PLAN OF CARE
OT: patient stronger this week, easier mobility in and out of bed. OT working hard on greater independence with toilet transfer and toileting. OT discussion with patient's son regarding furniture measurement at the condo and her son's comfort level with hands on assist as needed with mobility and personal cares. OT discussed with patient's son suggestion for lifeline and possible hearing assist device for greater safety. Patient's son reports follow up ortho appointment next week.

## 2019-05-10 NOTE — PLAN OF CARE
New dressing applied to all the left hip incisions, sutures and staples are intact. No drainage noted from the all the incisions. Pt continues to ambulate with assist of 1 and a walker to the bathroom, had a small bowel movement this morning. Declined the need for bowel medications. Barrier cream applied to perineal area.

## 2019-05-10 NOTE — PROGRESS NOTES
[facility] ([facility])    Medicine Progress Note - Hospitalist Service       Date of Admission:  5/2/2019  Assessment & Plan   Ms. Bernadine Farrell is a 89 year old female with a history of HTN, CAD s/p stenting, aortic stenosis s/p TAVR (2017), paroxysmal Afib, and hypothyroidism admitted to [facility] on 4/27 with left intertrochanteric fracture after fall at home and is now s/p IMN of left femur by Dr. Das of Orthopedic surgery with post-op c/b acute blood anemia and urinary tract infection, transferred to TCU on 5/2 for aggressive rehabilitation.       # S/p IMN left femur 2/2 left intertrochanteric fracture, 4/28: Pt presented w/ left hip pain after tripping and falling over her shoes. She was admitted to Trauma Service and underwent IMN w/ Dr. Das on 4/28/19.  Operative course unremarkable. Pain controlled and incision healing well.   - Continue pain management w/ PRN APAP, Lidoderm patches, and PRN low dose Oxycodone (has yet to use).   - Lovenox 30mg daily x 6 weeks per Ortho (end date ~6/9)  - Toe touch weight bearing on LLE   - Follow up in Ortho clinic as scheduled on 5/15.     # Hx of CAD s/p stenting   # Hx of aortic stenosis s/p TAVR (2017)  # Hx paroxysmal Afib   # HTN  Follows w/ Cardiology at [facility] ([facility]), last seen in clinic on 1/25/19.  PTA on Lipitor, Vasotec, Metoprolol, and Imdur; previously on Amlodipine but appears this was discontinued at last clinic visit.  Echocardiogram performed on 4/29/19 (presumably as part of preop workup given hx of valvular disease), EF 55-60%, no obvious WMAs, good TAVR function. Post-op started on metoprolol and amlodipine, but Vasotec and Imdur held. Currently without any cardiovascular complaints, in regular rhythm on exam, and HR<100. BPs since TCU admission mainly soft except on 5/9.   - Continue DAPT w/ ASA and Plavix    - Continue metoprolol, amlodipine, and Lipitor  - Continue to hold Imdur and Vasotec for now given soft BPs   - Follow up with   [facility] (Cardiology) in June; per clinic note, pt is supposed to wear 48 hr Holter monitor prior to this appointment     # Hypothyroidism: Most recent TSH WNL. Continue PTA Synthroid 50 mcg daily    # GERD:  Sxs stable. Continue PTA ranitidine 150 mg BID.      Resolved hospital issues:   # Acute blood loss anemia: Hgb drift from 12.0 to 7.7 after surgery.  Improved w/ 2 units PRBCs.  Currently Hgb stable at 10.7 on 5/9.  Monitor via CBC qM.  # UTI: UC on 4/28 w/ 10-50K Proteus mirabilis w/ resistance to Macrobid. Finished course of Cipro. Currently asymptomatic.       Diet: Regular Diet Adult    DVT Prophylaxis: Pneumatic Compression Devices  Cooper Catheter: not present  Code Status: Full Code        Jorgito Pate PA-C  Internal Medicine Hospitalist   [facility]  [phone]   ______________________________________________________________________    Interval History   No acute events overnight. Pain in L thigh tolerable with prn Tylenol and lidoderm patches. Denies other acute physical concerns at this time.     Data reviewed today: I reviewed all medications, new labs and imaging results over the last 24 hours.     Physical Exam   Vital Signs: Temp: 95.3  F (35.2  C) Temp src: Oral BP: 124/77 Pulse: 84 Heart Rate: 87 Resp: 18 SpO2: 99 % O2 Device: None (Room air)    Weight: 108 lbs 9.6 oz  GEN: In NAD  HEENT: NCAT; PERRL; sclerae non-icteric  LUNGS: CTAB  CV: RRR  ABD: +BSs; SNTND  EXT: No BLE edema; L thigh incision covered with c/d/i dressing  SKIN: No acute rashes noted on exposed areas.  NEURO: AAOx3; CNs grossly intact; No acute focal deficits noted.      Data   CMP  Recent Labs   Lab 05/09/19  0613 05/06/19  0736    137   POTASSIUM 3.8 3.8   CHLORIDE 104 102   CO2 27 28   ANIONGAP 7 7   * 118*   BUN 13 15   CR 0.71 0.69   GFRESTIMATED 74 76   GFRESTBLACK 86 88   SHANNAN 8.5 8.6     CBC  Recent Labs   Lab 05/09/19  0613 05/06/19  0736 05/04/19  0618   WBC 9.3 8.3  --    RBC 3.65*  3.83  --    HGB 10.7* 11.2*  --    HCT 33.7* 35.5  --    MCV 92 93  --    MCH 29.3 29.2  --    MCHC 31.8 31.5  --    RDW 13.9 14.0  --     331 213

## 2019-05-10 NOTE — PLAN OF CARE
Pt is alert and oriented x4 with hard of hearing. Assist of one with transfers to the bathroom x1. TTWB. Applied barrier cream to sacrum area for protection. Left hip incision dressings completed during AM shift; all are CDI. Applied ice packs x2 to LLE. Administered PRN Tylenol 1000 mg x1. Continue w/ POC.     Temp: 97.7  F (36.5  C) Temp src: Oral BP: 161/50 Pulse: 72 Heart Rate: 89 Resp: 16 SpO2: 98 % O2 Device: None (Room air)

## 2019-05-11 PROCEDURE — 97530 THERAPEUTIC ACTIVITIES: CPT | Mod: GP

## 2019-05-11 PROCEDURE — 25000132 ZZH RX MED GY IP 250 OP 250 PS 637: Performed by: NURSE PRACTITIONER

## 2019-05-11 PROCEDURE — 97110 THERAPEUTIC EXERCISES: CPT | Mod: GP

## 2019-05-11 PROCEDURE — 97535 SELF CARE MNGMENT TRAINING: CPT | Mod: GO | Performed by: OCCUPATIONAL THERAPIST

## 2019-05-11 PROCEDURE — A9270 NON-COVERED ITEM OR SERVICE: HCPCS | Performed by: NURSE PRACTITIONER

## 2019-05-11 PROCEDURE — 12000022 ZZH R&B SNF

## 2019-05-11 PROCEDURE — 97116 GAIT TRAINING THERAPY: CPT | Mod: GP

## 2019-05-11 RX ADMIN — LEVOTHYROXINE SODIUM 50 MCG: 25 TABLET ORAL at 08:12

## 2019-05-11 RX ADMIN — MULTIPLE VITAMINS W/ MINERALS TAB 1 TABLET: TAB at 08:12

## 2019-05-11 RX ADMIN — RANITIDINE 150 MG: 150 TABLET ORAL at 20:49

## 2019-05-11 RX ADMIN — ACETAMINOPHEN 1000 MG: 500 TABLET ORAL at 20:52

## 2019-05-11 RX ADMIN — CALCIUM 500 MG: 500 TABLET ORAL at 18:00

## 2019-05-11 RX ADMIN — CLOPIDOGREL BISULFATE 75 MG: 75 TABLET, FILM COATED ORAL at 08:12

## 2019-05-11 RX ADMIN — ASPIRIN 81 MG CHEWABLE TABLET 162 MG: 81 TABLET CHEWABLE at 08:12

## 2019-05-11 RX ADMIN — METOPROLOL TARTRATE 25 MG: 25 TABLET, FILM COATED ORAL at 20:49

## 2019-05-11 RX ADMIN — METOPROLOL TARTRATE 25 MG: 25 TABLET, FILM COATED ORAL at 08:17

## 2019-05-11 RX ADMIN — RANITIDINE 150 MG: 150 TABLET ORAL at 08:12

## 2019-05-11 RX ADMIN — CALCIUM 500 MG: 500 TABLET ORAL at 08:12

## 2019-05-11 RX ADMIN — AMLODIPINE BESYLATE 2.5 MG: 2.5 TABLET ORAL at 08:17

## 2019-05-11 RX ADMIN — LIDOCAINE 2 PATCH: 560 PATCH PERCUTANEOUS; TOPICAL; TRANSDERMAL at 08:11

## 2019-05-11 RX ADMIN — ATORVASTATIN CALCIUM 10 MG: 10 TABLET, FILM COATED ORAL at 20:49

## 2019-05-11 RX ADMIN — VITAMIN D, TAB 1000IU (100/BT) 1000 UNITS: 25 TAB at 08:12

## 2019-05-11 NOTE — PLAN OF CARE
Pt is alert and oriented. Denies SOB, chest pain and discomfort. TTWB. Assist of 1 to bathroom with walker and gait belt x2; applied barrier cream to sacrum for protection and bilateral groin. Provided brittney-cares x2. Pt slept majority of the shift with no concerns noted. Pleasant and hard of hearing. Continue w/ POC.

## 2019-05-11 NOTE — PLAN OF CARE
Pt continues to ambulate to the bathroom with assist of 1 and a walker. Maintains TTWB on the left L/E. Barrier cream applied to reddened perineal area. Redness is more prominent on the left groin. Denies the need for pain medication when offered before and after therapy sessions. Lidocaine patches applied to the left hip in the morning. Dressing was changed to all the 3 incisions of the left hip, no dehiscence or drainage noted.

## 2019-05-12 PROCEDURE — 12000022 ZZH R&B SNF

## 2019-05-12 PROCEDURE — A9270 NON-COVERED ITEM OR SERVICE: HCPCS | Performed by: NURSE PRACTITIONER

## 2019-05-12 PROCEDURE — 25000132 ZZH RX MED GY IP 250 OP 250 PS 637: Performed by: NURSE PRACTITIONER

## 2019-05-12 RX ADMIN — METOPROLOL TARTRATE 25 MG: 25 TABLET, FILM COATED ORAL at 09:27

## 2019-05-12 RX ADMIN — CALCIUM 500 MG: 500 TABLET ORAL at 18:18

## 2019-05-12 RX ADMIN — VITAMIN D, TAB 1000IU (100/BT) 1000 UNITS: 25 TAB at 09:27

## 2019-05-12 RX ADMIN — ATORVASTATIN CALCIUM 10 MG: 10 TABLET, FILM COATED ORAL at 20:49

## 2019-05-12 RX ADMIN — ACETAMINOPHEN 1000 MG: 500 TABLET ORAL at 20:49

## 2019-05-12 RX ADMIN — MULTIPLE VITAMINS W/ MINERALS TAB 1 TABLET: TAB at 09:27

## 2019-05-12 RX ADMIN — AMLODIPINE BESYLATE 2.5 MG: 2.5 TABLET ORAL at 09:27

## 2019-05-12 RX ADMIN — ASPIRIN 81 MG CHEWABLE TABLET 162 MG: 81 TABLET CHEWABLE at 09:26

## 2019-05-12 RX ADMIN — LEVOTHYROXINE SODIUM 50 MCG: 25 TABLET ORAL at 09:26

## 2019-05-12 RX ADMIN — RANITIDINE 150 MG: 150 TABLET ORAL at 20:49

## 2019-05-12 RX ADMIN — RANITIDINE 150 MG: 150 TABLET ORAL at 09:27

## 2019-05-12 RX ADMIN — LIDOCAINE 2 PATCH: 560 PATCH PERCUTANEOUS; TOPICAL; TRANSDERMAL at 09:33

## 2019-05-12 RX ADMIN — METOPROLOL TARTRATE 25 MG: 25 TABLET, FILM COATED ORAL at 20:49

## 2019-05-12 RX ADMIN — CLOPIDOGREL BISULFATE 75 MG: 75 TABLET, FILM COATED ORAL at 09:27

## 2019-05-12 RX ADMIN — CALCIUM 500 MG: 500 TABLET ORAL at 09:26

## 2019-05-12 NOTE — PLAN OF CARE
Patient slept most of this shift, up to bathroom x 2 this shift with assist of one person using walker. Patient reports pain but declined pain medication. Call light is within reach and she is able to make her needs known, continue plan of care

## 2019-05-12 NOTE — PLAN OF CARE
Patient A&Ox4. Son was in for a visit and brought sandwich, in which patient consumed 100%. Dressings to L hip/ lower leg CDI. PRN Tylenol given x1 for LLE pain at HS with moderate relief. Patient ambulated to bathroom with SBA and walker. Allowed staff to assist with wiping and perineal cares but refused barrier cream on reddened area on groin, stated cream irritated her skin. Area kept clean and dry per patient's request. SBP noted to be elevated in the 140s-160s in the past few days. Sticky note left for provider. Patient pleasant and cooperative with cares. Able to make needs known. Continue with POC.

## 2019-05-12 NOTE — PLAN OF CARE
Alert and oriented x 4. Able to communicate needs. Reported some pain on left hip ,but declined need for pain med. Lidoderm patch applied to left hip. Continent of B/B. Using BR with assist of one staff. Pt continues with TTWB on left leg. Appetite good. No new skin concern noted. Continue to monitor.

## 2019-05-13 LAB
ANION GAP SERPL CALCULATED.3IONS-SCNC: 5 MMOL/L (ref 3–14)
BUN SERPL-MCNC: 14 MG/DL (ref 7–30)
CALCIUM SERPL-MCNC: 8.6 MG/DL (ref 8.5–10.1)
CHLORIDE SERPL-SCNC: 105 MMOL/L (ref 94–109)
CO2 SERPL-SCNC: 28 MMOL/L (ref 20–32)
CREAT SERPL-MCNC: 0.68 MG/DL (ref 0.52–1.04)
ERYTHROCYTE [DISTWIDTH] IN BLOOD BY AUTOMATED COUNT: 14 % (ref 10–15)
GFR SERPL CREATININE-BSD FRML MDRD: 76 ML/MIN/{1.73_M2}
GLUCOSE SERPL-MCNC: 108 MG/DL (ref 70–99)
HCT VFR BLD AUTO: 35.3 % (ref 35–47)
HGB BLD-MCNC: 11.2 G/DL (ref 11.7–15.7)
MCH RBC QN AUTO: 29.6 PG (ref 26.5–33)
MCHC RBC AUTO-ENTMCNC: 31.7 G/DL (ref 31.5–36.5)
MCV RBC AUTO: 93 FL (ref 78–100)
PLATELET # BLD AUTO: 498 10E9/L (ref 150–450)
POTASSIUM SERPL-SCNC: 3.7 MMOL/L (ref 3.4–5.3)
RBC # BLD AUTO: 3.79 10E12/L (ref 3.8–5.2)
SODIUM SERPL-SCNC: 138 MMOL/L (ref 133–144)
WBC # BLD AUTO: 6.2 10E9/L (ref 4–11)

## 2019-05-13 PROCEDURE — 97110 THERAPEUTIC EXERCISES: CPT | Mod: GP

## 2019-05-13 PROCEDURE — 85027 COMPLETE CBC AUTOMATED: CPT | Performed by: NURSE PRACTITIONER

## 2019-05-13 PROCEDURE — 12000022 ZZH R&B SNF

## 2019-05-13 PROCEDURE — 97110 THERAPEUTIC EXERCISES: CPT | Mod: GO | Performed by: OCCUPATIONAL THERAPIST

## 2019-05-13 PROCEDURE — 80048 BASIC METABOLIC PNL TOTAL CA: CPT | Performed by: NURSE PRACTITIONER

## 2019-05-13 PROCEDURE — 97535 SELF CARE MNGMENT TRAINING: CPT | Mod: GO | Performed by: OCCUPATIONAL THERAPIST

## 2019-05-13 PROCEDURE — 97116 GAIT TRAINING THERAPY: CPT | Mod: GP

## 2019-05-13 PROCEDURE — A9270 NON-COVERED ITEM OR SERVICE: HCPCS | Performed by: NURSE PRACTITIONER

## 2019-05-13 PROCEDURE — 25000132 ZZH RX MED GY IP 250 OP 250 PS 637: Performed by: NURSE PRACTITIONER

## 2019-05-13 PROCEDURE — 36415 COLL VENOUS BLD VENIPUNCTURE: CPT | Performed by: NURSE PRACTITIONER

## 2019-05-13 RX ADMIN — ACETAMINOPHEN 1000 MG: 500 TABLET ORAL at 20:43

## 2019-05-13 RX ADMIN — RANITIDINE 150 MG: 150 TABLET ORAL at 20:43

## 2019-05-13 RX ADMIN — VITAMIN D, TAB 1000IU (100/BT) 1000 UNITS: 25 TAB at 08:09

## 2019-05-13 RX ADMIN — RANITIDINE 150 MG: 150 TABLET ORAL at 08:10

## 2019-05-13 RX ADMIN — MULTIPLE VITAMINS W/ MINERALS TAB 1 TABLET: TAB at 08:11

## 2019-05-13 RX ADMIN — CALCIUM 500 MG: 500 TABLET ORAL at 17:56

## 2019-05-13 RX ADMIN — AMLODIPINE BESYLATE 2.5 MG: 2.5 TABLET ORAL at 08:11

## 2019-05-13 RX ADMIN — CALCIUM 500 MG: 500 TABLET ORAL at 08:11

## 2019-05-13 RX ADMIN — ATORVASTATIN CALCIUM 10 MG: 10 TABLET, FILM COATED ORAL at 20:43

## 2019-05-13 RX ADMIN — METOPROLOL TARTRATE 25 MG: 25 TABLET, FILM COATED ORAL at 08:11

## 2019-05-13 RX ADMIN — ASPIRIN 81 MG CHEWABLE TABLET 162 MG: 81 TABLET CHEWABLE at 08:10

## 2019-05-13 RX ADMIN — LIDOCAINE 2 PATCH: 560 PATCH PERCUTANEOUS; TOPICAL; TRANSDERMAL at 08:08

## 2019-05-13 RX ADMIN — METOPROLOL TARTRATE 25 MG: 25 TABLET, FILM COATED ORAL at 20:43

## 2019-05-13 RX ADMIN — LEVOTHYROXINE SODIUM 50 MCG: 25 TABLET ORAL at 08:09

## 2019-05-13 RX ADMIN — CLOPIDOGREL BISULFATE 75 MG: 75 TABLET, FILM COATED ORAL at 08:11

## 2019-05-13 NOTE — PLAN OF CARE
Patient A&Ox4. Son was in for a visit and brought Chinese food, in which patient consumed 100%. Dressing changed to L hip/ lower leg. PRN Tylenol given x1 for LLE pain at HS with moderate relief. Patient ambulated to bathroom with SBA and walker. Continent of bowel and bladder. LBM today. Patient pleasant and cooperative with cares. Able to make needs known. Continue with POC.

## 2019-05-13 NOTE — PLAN OF CARE
Patient night was uneventful, up to bathroom x 2 using walker. No complained of pain and no respiratory distress noted, continue to monitor.

## 2019-05-13 NOTE — UTILIZATION REVIEW
Pain Interview    1. Have you had pain or hurting at any time in the last 5 days?  Yes  2. How much of the time have you experienced pain or hurting over the last 5 days? Occasionally  3. Over the past 5 days, has pain made it hard for you to sleep at night?  No  4. Over the past 5 days, have you limited your day-to-day activities because of pain?Yes  5. Rate pain intensity: Verbal: Moderate

## 2019-05-13 NOTE — PLAN OF CARE
Pt is A&O x4. Hard of hearing. No signs of respiratory distress. Pt did not request PRN pain medication this shift. Good appetite. Continent of bowel and bladder. Toe touch weight bearing to LL extremity. Able to communicate needs. Call light within reach. Will continue to monitor.

## 2019-05-13 NOTE — PLAN OF CARE
OT- Patient appears to be maintaining L LE TTWB when toileting self and short mobility with wheeled walker CGA for safety. Needs repetition of cues d/t [place]

## 2019-05-14 PROCEDURE — 97110 THERAPEUTIC EXERCISES: CPT | Mod: GP

## 2019-05-14 PROCEDURE — 25000132 ZZH RX MED GY IP 250 OP 250 PS 637: Performed by: NURSE PRACTITIONER

## 2019-05-14 PROCEDURE — A9270 NON-COVERED ITEM OR SERVICE: HCPCS | Performed by: NURSE PRACTITIONER

## 2019-05-14 PROCEDURE — 97110 THERAPEUTIC EXERCISES: CPT | Mod: GO

## 2019-05-14 PROCEDURE — 97116 GAIT TRAINING THERAPY: CPT | Mod: GP

## 2019-05-14 PROCEDURE — 12000022 ZZH R&B SNF

## 2019-05-14 PROCEDURE — 99308 SBSQ NF CARE LOW MDM 20: CPT | Performed by: PHYSICIAN ASSISTANT

## 2019-05-14 PROCEDURE — A9270 NON-COVERED ITEM OR SERVICE: HCPCS | Performed by: PHYSICIAN ASSISTANT

## 2019-05-14 PROCEDURE — 97535 SELF CARE MNGMENT TRAINING: CPT | Mod: GO

## 2019-05-14 PROCEDURE — 25000132 ZZH RX MED GY IP 250 OP 250 PS 637: Performed by: PHYSICIAN ASSISTANT

## 2019-05-14 PROCEDURE — 97530 THERAPEUTIC ACTIVITIES: CPT | Mod: GP

## 2019-05-14 PROCEDURE — 99207 ZZC CDG-MDM COMPONENT: MEETS LOW - DOWN CODED: CPT | Performed by: PHYSICIAN ASSISTANT

## 2019-05-14 RX ORDER — ENALAPRIL MALEATE 5 MG/1
5 TABLET ORAL DAILY
Status: DISCONTINUED | OUTPATIENT
Start: 2019-05-14 | End: 2019-05-15

## 2019-05-14 RX ADMIN — VITAMIN D, TAB 1000IU (100/BT) 1000 UNITS: 25 TAB at 09:24

## 2019-05-14 RX ADMIN — METOPROLOL TARTRATE 25 MG: 25 TABLET, FILM COATED ORAL at 08:33

## 2019-05-14 RX ADMIN — ASPIRIN 81 MG CHEWABLE TABLET 162 MG: 81 TABLET CHEWABLE at 09:23

## 2019-05-14 RX ADMIN — ACETAMINOPHEN 1000 MG: 500 TABLET ORAL at 20:39

## 2019-05-14 RX ADMIN — RANITIDINE 150 MG: 150 TABLET ORAL at 09:24

## 2019-05-14 RX ADMIN — LEVOTHYROXINE SODIUM 50 MCG: 25 TABLET ORAL at 08:34

## 2019-05-14 RX ADMIN — ENALAPRIL MALEATE 5 MG: 5 TABLET ORAL at 16:09

## 2019-05-14 RX ADMIN — MULTIPLE VITAMINS W/ MINERALS TAB 1 TABLET: TAB at 09:24

## 2019-05-14 RX ADMIN — LIDOCAINE 2 PATCH: 560 PATCH PERCUTANEOUS; TOPICAL; TRANSDERMAL at 08:35

## 2019-05-14 RX ADMIN — AMLODIPINE BESYLATE 2.5 MG: 2.5 TABLET ORAL at 08:33

## 2019-05-14 RX ADMIN — ACETAMINOPHEN 1000 MG: 500 TABLET ORAL at 09:28

## 2019-05-14 RX ADMIN — RANITIDINE 150 MG: 150 TABLET ORAL at 20:39

## 2019-05-14 RX ADMIN — METOPROLOL TARTRATE 25 MG: 25 TABLET, FILM COATED ORAL at 20:39

## 2019-05-14 RX ADMIN — CLOPIDOGREL BISULFATE 75 MG: 75 TABLET, FILM COATED ORAL at 09:28

## 2019-05-14 RX ADMIN — CALCIUM 500 MG: 500 TABLET ORAL at 09:24

## 2019-05-14 RX ADMIN — CALCIUM 500 MG: 500 TABLET ORAL at 19:25

## 2019-05-14 RX ADMIN — ATORVASTATIN CALCIUM 10 MG: 10 TABLET, FILM COATED ORAL at 19:25

## 2019-05-14 NOTE — PLAN OF CARE
PT: Ambulation with FWW in hallway (~30 ft), SBA in both AM and PM sessions. Pt demos good respect of weight bearing status in L LE.   Continue LE strengthening and increasing endurance, fatigue is limiting factor.

## 2019-05-14 NOTE — PROGRESS NOTES
Transitional Care Unit  Progress Note  Rachel Finley PA-C  5/14/19          Assessment & Plan:   Bernadine Farrell is a 89 year old female w/ a hx of HTN. CAD s/p stenting, aortic stenosis s/p TAVR (2017), paroxysmal Afib, and hypothyroidism who was admitted to [facility] on 4/27 w/ L intertrochanteric fracture after fall at home who is now s/p IMN to L femur by Dr. Das w/ post-op course c/b acute blood loss anemia and UTI, transferred to TCU on 5/2 for aggressive rehabilitation.     # S/p IMN left femur 2/2 left intertrochanteric fracture, 4/28: Pt presented w/ left hip pain after tripping and falling over her shoes. She was admitted to Trauma Service and underwent IMN w/ Dr. Das on 4/28/19.  Operative course unremarkable. Pain controlled and incision healing well.   - Continue pain management w/ PRN APAP, Lidoderm patches, and PRN low dose Oxycodone (has yet to use)   - Toe touch weight bearing on LLE   - Follow up in Ortho clinic as scheduled on 5/15.     Hx of CAD s/p stenting   Hx of aortic stenosis s/p TAVR (2017)  Hx paroxysmal Afib   HTN  Follows w/ Cardiology at [facility] ([facility]), last seen in clinic on 1/25/19.  PTA on Lipitor, Vasotec, Metoprolol, and Imdur; previously on Amlodipine but appears this was discontinued at last clinic visit.  Echocardiogram performed on 4/29/19 (presumably as part of preop workup given hx of valvular disease), EF 55-60%, no obvious WMAs, good TAVR function. Post-op started on metoprolol and amlodipine, but Vasotec and Imdur held. Currently without any cardiovascular complaints, in regular rhythm on exam, and HR<100.   - Continue DAPT w/ ASA and Plavix    - Continue metoprolol, amlodipine, and Lipitor  - Resume Vasotec at 5 mg daily given recent elevation in BP  - Continue to hold Imdur, will continue to monitor and resume if indicated   - Follow up with Dr. Key (Cardiology) in June; per clinic note, pt is supposed to wear 48 hr Holter monitor prior to this  "appointment     Hypothyroidism. Most recent TSH WNL. Continue PTA Synthroid 50 mcg daily     GERD.  Sxs stable. Continue PTA ranitidine 150 mg BID.      Resolved hospital issues:   # Acute blood loss anemia: Hgb drift from 12.0 to 7.7 after surgery.  Improved w/ 2 units PRBCs.  Currently Hgb stable at 10.7 on 5/9.  Monitor via CBC qM.  # UTI: UC on 4/28 w/ 10-50K Proteus mirabilis w/ resistance to Macrobid. Finished course of Cipro. Currently asymptomatic.           Diet and/or tube feedings: regular   DVT/GI prophylaxis: Pneumatic Compression Devices  Code status: Full Code    Tanisha Finley PA-C  Hospitalist Service  [phone]         Consults:   PT, OT         Discharge Planning:   Therapy through 5/18, discharge 5/19.        Interval History:   Chief complaint of L leg aching pain following therapies. States that pain is tolerabe and describes it more as \"sore\" than painful. Denies any other symptoms such as chest pain, shortness of breath or difficulty breathing. No fevers or chills. Tolerating therapies well, and feels that she is getting stronger.             Physical Exam:   Blood pressure 158/49, pulse 83, temperature 96.7  F (35.9  C), temperature source Oral, resp. rate 17, weight 49.3 kg (108 lb 9.6 oz), SpO2 97 %.  GENERAL: Awake. Appears to be resting comfortably. NAD.   HEENT: NC/AT. Anicteric sclera. Mucous membranes moist.  NECK: Supple   CV: RRR, S1S2. No appreciable murmurs, rubs, or gallops.   RESPIRATORY: Normal respiratory effort on RA. Lungs CTAB without wheezes, rales or rhonchi.   GI: Soft, non-tender, and non-distended with bowel sounds present in all quadrants.  EXTREMITIES: No peripheral edema. Warm & well perfused.  NEUROLOGIC: Alert and orientated x 3. CN II-XII grossly intact. No focal deficits.   MUSCULOSKELETAL: No joint swelling or tenderness.   SKIN: No jaundice. No rashes or lesions.         "

## 2019-05-14 NOTE — PLAN OF CARE
RN: Using prn ES tylenol x 1 dose after first morning therapy and ice pack, lidocaine patches left hip, effective pain control. Continues TTWB LLE.

## 2019-05-14 NOTE — PLAN OF CARE
OT: Pt demod consistency with L LE TTWB while completing hygiene cares standing at sink and during short mobility with 2ww around room SBA. Pt tolerated 9 minutes of dynamic functional tasks while standing with no LOB noted. Placed cold pack back on L knee at end of session.

## 2019-05-14 NOTE — PLAN OF CARE
Patient slept between cares. A&Ox4. Denied pain/ discomfort. No SOB observed or reported. Dressings to left hip/ lower leg CDI. Patient assisted to bathroom x2 with SBA and walker. Continent of bowel and bladder. Pleasant and cooperative with cares. Able to make needs known. Continue with POC.

## 2019-05-14 NOTE — PLAN OF CARE
Alert and oriented x 4. Good appetite and fluid intake. Pt requested tylenol at bedtime gave her 1000mg. Changed surgical  wound dressing, no s/s of infection and drainage noted around the site. Ambulated to the bathroom with assist of one using a walker.

## 2019-05-15 ENCOUNTER — ALLIED HEALTH/NURSE VISIT (OUTPATIENT)
Dept: ORTHOPEDICS | Facility: CLINIC | Age: 84
End: 2019-05-15
Payer: MEDICARE

## 2019-05-15 ENCOUNTER — TELEPHONE (OUTPATIENT)
Dept: ORTHOPEDICS | Facility: CLINIC | Age: 84
End: 2019-05-15

## 2019-05-15 DIAGNOSIS — S72.009A HIP FRACTURE (H): Primary | ICD-10-CM

## 2019-05-15 PROCEDURE — 25000132 ZZH RX MED GY IP 250 OP 250 PS 637: Performed by: NURSE PRACTITIONER

## 2019-05-15 PROCEDURE — 12000022 ZZH R&B SNF

## 2019-05-15 PROCEDURE — 97110 THERAPEUTIC EXERCISES: CPT | Mod: GO | Performed by: OCCUPATIONAL THERAPIST

## 2019-05-15 PROCEDURE — 97110 THERAPEUTIC EXERCISES: CPT | Mod: GP | Performed by: PHYSICAL THERAPIST

## 2019-05-15 PROCEDURE — 97535 SELF CARE MNGMENT TRAINING: CPT | Mod: GO | Performed by: OCCUPATIONAL THERAPIST

## 2019-05-15 PROCEDURE — 25000132 ZZH RX MED GY IP 250 OP 250 PS 637: Performed by: PHYSICIAN ASSISTANT

## 2019-05-15 PROCEDURE — A9270 NON-COVERED ITEM OR SERVICE: HCPCS | Performed by: NURSE PRACTITIONER

## 2019-05-15 PROCEDURE — 97116 GAIT TRAINING THERAPY: CPT | Mod: GP | Performed by: PHYSICAL THERAPIST

## 2019-05-15 PROCEDURE — 97530 THERAPEUTIC ACTIVITIES: CPT | Mod: GP | Performed by: PHYSICAL THERAPIST

## 2019-05-15 PROCEDURE — A9270 NON-COVERED ITEM OR SERVICE: HCPCS | Performed by: PHYSICIAN ASSISTANT

## 2019-05-15 RX ORDER — ENALAPRIL MALEATE 2.5 MG/1
2.5 TABLET ORAL DAILY
Status: DISCONTINUED | OUTPATIENT
Start: 2019-05-16 | End: 2019-05-18

## 2019-05-15 RX ADMIN — VITAMIN D, TAB 1000IU (100/BT) 1000 UNITS: 25 TAB at 07:41

## 2019-05-15 RX ADMIN — METOPROLOL TARTRATE 25 MG: 25 TABLET, FILM COATED ORAL at 08:36

## 2019-05-15 RX ADMIN — CALCIUM 500 MG: 500 TABLET ORAL at 07:40

## 2019-05-15 RX ADMIN — ASPIRIN 81 MG CHEWABLE TABLET 162 MG: 81 TABLET CHEWABLE at 07:40

## 2019-05-15 RX ADMIN — CALCIUM 500 MG: 500 TABLET ORAL at 18:12

## 2019-05-15 RX ADMIN — LEVOTHYROXINE SODIUM 50 MCG: 25 TABLET ORAL at 07:41

## 2019-05-15 RX ADMIN — METOPROLOL TARTRATE 25 MG: 25 TABLET, FILM COATED ORAL at 21:11

## 2019-05-15 RX ADMIN — MULTIPLE VITAMINS W/ MINERALS TAB 1 TABLET: TAB at 07:41

## 2019-05-15 RX ADMIN — AMLODIPINE BESYLATE 2.5 MG: 2.5 TABLET ORAL at 07:41

## 2019-05-15 RX ADMIN — ATORVASTATIN CALCIUM 10 MG: 10 TABLET, FILM COATED ORAL at 21:10

## 2019-05-15 RX ADMIN — Medication 5 MG: at 21:39

## 2019-05-15 RX ADMIN — RANITIDINE 150 MG: 150 TABLET ORAL at 07:40

## 2019-05-15 RX ADMIN — LIDOCAINE 2 PATCH: 560 PATCH PERCUTANEOUS; TOPICAL; TRANSDERMAL at 07:38

## 2019-05-15 RX ADMIN — CLOPIDOGREL BISULFATE 75 MG: 75 TABLET, FILM COATED ORAL at 07:40

## 2019-05-15 RX ADMIN — RANITIDINE 150 MG: 150 TABLET ORAL at 21:11

## 2019-05-15 RX ADMIN — ENALAPRIL MALEATE 5 MG: 5 TABLET ORAL at 07:41

## 2019-05-15 RX ADMIN — ACETAMINOPHEN 1000 MG: 500 TABLET ORAL at 16:06

## 2019-05-15 ASSESSMENT — ENCOUNTER SYMPTOMS
STIFFNESS: 0
NECK PAIN: 0
MYALGIAS: 0
ARTHRALGIAS: 0
JOINT SWELLING: 0
MUSCLE WEAKNESS: 0
BACK PAIN: 0
MUSCLE CRAMPS: 0

## 2019-05-15 NOTE — PROGRESS NOTES
Reason for visit:    Bernadine Farrell came in to the clinic for a two week post op check.    Her surgery was done 4/28/19 by Dr Das.  She had left Open Reduction Internal Fixation, Fracture, Femur, Using Intramedullary Mohan      Assessment:    Bernadine came into the clinic Non-WB    The Surgical wounds were exposed and found to be well-healed; so the sutures and staples were removed.    Plan:     Steri strips were applied. Clarification on WB status will be answered after speaking with Dr. Das.     She has an appointment to see Dr. Das at that time Dr. Das will determine further restrictions.    She has our phone number and will call with questions or problems.      Estephania Connolly ATC      After reaching out to Dr. Das the patient is allowed to WBAT as long as she is not pushing through pain. It is also very important at this point for the patient to be working with PT especially on ROM of the knee.

## 2019-05-15 NOTE — PLAN OF CARE
Patient alert and oriented. Went to the BR 3x. Had 2 medium soft BM. Denied pain and discomfort. Uses call light approprietly. Dressing x 3 to L hip CDI. No respiratory distress noted. Will continue with current plan of care.

## 2019-05-15 NOTE — TELEPHONE ENCOUNTER
I called the patients son this morning after hearing back from Dr. Das. The patient is allowed to be WBAT but not push through pain. I also relayed to the son that it is also very important for the patient to be working on knee ROM with her PT.    Estephania Connolly, ATC

## 2019-05-15 NOTE — PLAN OF CARE
RN: Saw ortho today, WBAT, staples out, sliding scale intact. Pt son with pt and updated on plan of care, plans to discharge to home on Sunday with therapies/nurse, walker.

## 2019-05-15 NOTE — PLAN OF CARE
Her BP was 185/71 around 1600 gave Vasotec 5mg at bedtime BP was 143/50. Good appetite and fluid intake. Ambulated to the bathroom with SBA  using a walker TTWB on LLE. Denied having pain and discomfort. Gave tylenol 1000mg at bedtime for comfort. Changed surgical staples site dressing no drainage noted.

## 2019-05-15 NOTE — PLAN OF CARE
OT: Ortho MD appt in am, pt now WBAT in LLE. Reviewed ADLs and functional transfers within new activity precautions. Pt SBA/CGA SPT and room ambulation using fww, SBA ADLs. -30 min due to pt at ortho appt during am session.

## 2019-05-15 NOTE — PROGRESS NOTES
CLINICAL NUTRITION SERVICES - REASSESSMENT NOTE     Nutrition Prescription    RECOMMENDATIONS FOR MDs/PROVIDERS TO ORDER:  None at this time    Malnutrition Status:    Non-severe malnutrition in the context of chronic illness     Recommendations already ordered by Registered Dietitian (RD):  None at this time- pt declined any ONS or snack options    Future/Additional Recommendations:  1. Monitor PO and wt trends  2. Encourage high protein/kcal foods  3. Continue to offer nutrition supplements      EVALUATION OF THE PROGRESS TOWARD GOALS   Diet: Regular    Intake: Says her appetite is ok right now and that she has been eating 3 meals a day.   Per RN flowsheet intake of %. Bernadine has been ordering 2-3 meals a day and per meal ordering system average order over the past 5 days 1257 calories (93% assessed needs) and 38 g protein (58% assessed needs) per day. Pt says on days that she only ordered 2 meals she had outside food brought in.      NEW FINDINGS   Wt trends: Pt reports a UBW of 110-112 lbs. Wt fluctuations over hospital admission. Pt appears to have lost 2.9 kg (6%) over the last 6 months.   05/10/19 1008 49.3 kg (108 lb 9.6 oz) NK   05/02/19 1730 53.5 kg (118 lb) CB     05/01/19 0448 54.4 kg (119 lb 14.9 oz) LW   04/27/19 1919 49.9 kg (110 lb) LH   51.8 kg (114 lbs) on 1/25/19  52.2 kg (115 lbs) on 12/07/18    MALNUTRITION  % Intake: Decreased intake does not meet criteria  % Weight Loss: Up to 10% in 6 months (non-severe)  Subcutaneous Fat Loss: None observed  Muscle Loss: Facial & jaw region, Thoracic region (clavicle, acromium bone, deltoid, trapezius, pectoral) and Upper arm (bicep, tricep):  Mild - consistent with aging  Fluid Accumulation/Edema: None noted  Malnutrition Diagnosis: Non-severe malnutrition in the context of chronic illness     Previous Goals   Patient to consume % of nutritionally adequate meal trays TID, or the equivalent with supplements/snacks.  Evaluation: Met    Previous  Nutrition Diagnosis  Inadequate protein intake related to increased needs post op with ok appetite as evidenced by patients meals only providing 61% assessed protein needs per meal ordering system.   Evaluation: No change    CURRENT NUTRITION DIAGNOSIS  Inadequate protein intake related to increased needs post op with ok appetite as evidenced by patients meals only providing 58% assessed protein needs per meal ordering system.     INTERVENTIONS  Implementation  Nutrition Education: Discussed importance of adequate protein and foods available that are high in protein and calories. Offered ONS and snack options and pt declined at this time.     Goals  Patient to consume % of nutritionally adequate meal trays TID, or the equivalent with supplements/snacks.    Monitoring/Evaluation  Progress toward goals will be monitored and evaluated per protocol.    Jessica Yates, Dietetic Intern

## 2019-05-16 LAB
ANION GAP SERPL CALCULATED.3IONS-SCNC: 9 MMOL/L (ref 3–14)
BUN SERPL-MCNC: 13 MG/DL (ref 7–30)
CALCIUM SERPL-MCNC: 9 MG/DL (ref 8.5–10.1)
CHLORIDE SERPL-SCNC: 103 MMOL/L (ref 94–109)
CO2 SERPL-SCNC: 26 MMOL/L (ref 20–32)
CREAT SERPL-MCNC: 0.79 MG/DL (ref 0.52–1.04)
ERYTHROCYTE [DISTWIDTH] IN BLOOD BY AUTOMATED COUNT: 14.2 % (ref 10–15)
GFR SERPL CREATININE-BSD FRML MDRD: 65 ML/MIN/{1.73_M2}
GLUCOSE SERPL-MCNC: 111 MG/DL (ref 70–99)
HCT VFR BLD AUTO: 37.5 % (ref 35–47)
HGB BLD-MCNC: 11.9 G/DL (ref 11.7–15.7)
MCH RBC QN AUTO: 29.8 PG (ref 26.5–33)
MCHC RBC AUTO-ENTMCNC: 31.7 G/DL (ref 31.5–36.5)
MCV RBC AUTO: 94 FL (ref 78–100)
PLATELET # BLD AUTO: 494 10E9/L (ref 150–450)
POTASSIUM SERPL-SCNC: 3.6 MMOL/L (ref 3.4–5.3)
RBC # BLD AUTO: 4 10E12/L (ref 3.8–5.2)
SODIUM SERPL-SCNC: 138 MMOL/L (ref 133–144)
WBC # BLD AUTO: 6.7 10E9/L (ref 4–11)

## 2019-05-16 PROCEDURE — A9270 NON-COVERED ITEM OR SERVICE: HCPCS | Performed by: PHYSICIAN ASSISTANT

## 2019-05-16 PROCEDURE — 97535 SELF CARE MNGMENT TRAINING: CPT | Mod: GO | Performed by: OCCUPATIONAL THERAPIST

## 2019-05-16 PROCEDURE — 97110 THERAPEUTIC EXERCISES: CPT | Mod: GP | Performed by: PHYSICAL THERAPIST

## 2019-05-16 PROCEDURE — 97116 GAIT TRAINING THERAPY: CPT | Mod: GP | Performed by: PHYSICAL THERAPIST

## 2019-05-16 PROCEDURE — 85027 COMPLETE CBC AUTOMATED: CPT | Performed by: NURSE PRACTITIONER

## 2019-05-16 PROCEDURE — 25000132 ZZH RX MED GY IP 250 OP 250 PS 637: Performed by: PHYSICIAN ASSISTANT

## 2019-05-16 PROCEDURE — A9270 NON-COVERED ITEM OR SERVICE: HCPCS | Performed by: NURSE PRACTITIONER

## 2019-05-16 PROCEDURE — 80048 BASIC METABOLIC PNL TOTAL CA: CPT | Performed by: NURSE PRACTITIONER

## 2019-05-16 PROCEDURE — 25000132 ZZH RX MED GY IP 250 OP 250 PS 637: Performed by: NURSE PRACTITIONER

## 2019-05-16 PROCEDURE — 12000022 ZZH R&B SNF

## 2019-05-16 PROCEDURE — 36415 COLL VENOUS BLD VENIPUNCTURE: CPT | Performed by: NURSE PRACTITIONER

## 2019-05-16 RX ADMIN — LEVOTHYROXINE SODIUM 50 MCG: 25 TABLET ORAL at 08:29

## 2019-05-16 RX ADMIN — METOPROLOL TARTRATE 25 MG: 25 TABLET, FILM COATED ORAL at 08:29

## 2019-05-16 RX ADMIN — ATORVASTATIN CALCIUM 10 MG: 10 TABLET, FILM COATED ORAL at 19:06

## 2019-05-16 RX ADMIN — ENALAPRIL MALEATE 2.5 MG: 2.5 TABLET ORAL at 08:29

## 2019-05-16 RX ADMIN — ACETAMINOPHEN 1000 MG: 500 TABLET ORAL at 19:06

## 2019-05-16 RX ADMIN — RANITIDINE 150 MG: 150 TABLET ORAL at 08:29

## 2019-05-16 RX ADMIN — CALCIUM 500 MG: 500 TABLET ORAL at 19:06

## 2019-05-16 RX ADMIN — MULTIPLE VITAMINS W/ MINERALS TAB 1 TABLET: TAB at 08:29

## 2019-05-16 RX ADMIN — VITAMIN D, TAB 1000IU (100/BT) 1000 UNITS: 25 TAB at 08:29

## 2019-05-16 RX ADMIN — LIDOCAINE 2 PATCH: 560 PATCH PERCUTANEOUS; TOPICAL; TRANSDERMAL at 08:28

## 2019-05-16 RX ADMIN — ASPIRIN 81 MG CHEWABLE TABLET 162 MG: 81 TABLET CHEWABLE at 08:28

## 2019-05-16 RX ADMIN — CALCIUM 500 MG: 500 TABLET ORAL at 08:29

## 2019-05-16 RX ADMIN — CLOPIDOGREL BISULFATE 75 MG: 75 TABLET, FILM COATED ORAL at 08:29

## 2019-05-16 RX ADMIN — RANITIDINE 150 MG: 150 TABLET ORAL at 22:08

## 2019-05-16 RX ADMIN — AMLODIPINE BESYLATE 2.5 MG: 2.5 TABLET ORAL at 08:29

## 2019-05-16 RX ADMIN — METOPROLOL TARTRATE 25 MG: 25 TABLET, FILM COATED ORAL at 22:07

## 2019-05-16 RX ADMIN — ACETAMINOPHEN 1000 MG: 500 TABLET ORAL at 08:32

## 2019-05-16 ASSESSMENT — PAIN DESCRIPTION - DESCRIPTORS: DESCRIPTORS: ACHING;DISCOMFORT;SORE

## 2019-05-16 NOTE — PLAN OF CARE
RN: Vasotec started 5/14, continue to monitor BP. Plans to discharge to home on Sunday.  WBAT LLE.  Using prn tylenol and cold packs for pain control. Up with staff  only, using call light appropriately.

## 2019-05-16 NOTE — UTILIZATION REVIEW
Pain Interview  14 day    1. Have you had pain or hurting at any time in the last 5 days?  Yes  2. How much of the time have you experienced pain or hurting over the last 5 days? Occasionally  3. Over the past 5 days, has pain made it hard for you to sleep at night?  No  4. Over the past 5 days, have you limited your day-to-day activities because of pain?No  5. Rate pain intensity: Numeric 4

## 2019-05-16 NOTE — PLAN OF CARE
Pt did better when not thinking so hard about walking; 85 ft with ww; CGA. Pt reports walking with staff into bathroom overnight--went well, didn't even think about it. Pt on track for goals.

## 2019-05-16 NOTE — PLAN OF CARE
Patient alert and oriented x 4. [place]. Patient denied pain and discomfort. L hip incisions intact with steri strips on. Patient uses call light approprietly. Patient transfer with SBA, uses walker for ambulation. No respiratory distress noted. Will continue with current plan of care.

## 2019-05-16 NOTE — PLAN OF CARE
Alert and oriented x 4. Good appetite and fluid intake. Ambulated to the bathroom x 2 and it was painful for her to put weight on L leg. Gave tylenol around 1600 and oxycodone at bedtime. Surgical area steri strips intact.

## 2019-05-16 NOTE — PLAN OF CARE
OT: CX am OT tX per patient request, OT  will continue with scheduled PM OT TX as patient tolerates.

## 2019-05-17 PROCEDURE — 97535 SELF CARE MNGMENT TRAINING: CPT | Mod: GO | Performed by: OCCUPATIONAL THERAPIST

## 2019-05-17 PROCEDURE — A9270 NON-COVERED ITEM OR SERVICE: HCPCS | Performed by: PHYSICIAN ASSISTANT

## 2019-05-17 PROCEDURE — 12000022 ZZH R&B SNF

## 2019-05-17 PROCEDURE — A9270 NON-COVERED ITEM OR SERVICE: HCPCS | Performed by: NURSE PRACTITIONER

## 2019-05-17 PROCEDURE — 25000132 ZZH RX MED GY IP 250 OP 250 PS 637: Performed by: NURSE PRACTITIONER

## 2019-05-17 PROCEDURE — 25000132 ZZH RX MED GY IP 250 OP 250 PS 637: Performed by: PHYSICIAN ASSISTANT

## 2019-05-17 PROCEDURE — 97110 THERAPEUTIC EXERCISES: CPT | Mod: GP | Performed by: PHYSICAL THERAPIST

## 2019-05-17 PROCEDURE — 97116 GAIT TRAINING THERAPY: CPT | Mod: GP | Performed by: PHYSICAL THERAPIST

## 2019-05-17 RX ADMIN — RANITIDINE 150 MG: 150 TABLET ORAL at 08:47

## 2019-05-17 RX ADMIN — ASPIRIN 81 MG CHEWABLE TABLET 162 MG: 81 TABLET CHEWABLE at 08:47

## 2019-05-17 RX ADMIN — RANITIDINE 150 MG: 150 TABLET ORAL at 20:54

## 2019-05-17 RX ADMIN — SENNOSIDES AND DOCUSATE SODIUM 2 TABLET: 8.6; 5 TABLET ORAL at 20:54

## 2019-05-17 RX ADMIN — LEVOTHYROXINE SODIUM 50 MCG: 25 TABLET ORAL at 08:47

## 2019-05-17 RX ADMIN — CLOPIDOGREL BISULFATE 75 MG: 75 TABLET, FILM COATED ORAL at 08:47

## 2019-05-17 RX ADMIN — ACETAMINOPHEN 1000 MG: 500 TABLET ORAL at 21:07

## 2019-05-17 RX ADMIN — CALCIUM 500 MG: 500 TABLET ORAL at 17:53

## 2019-05-17 RX ADMIN — MULTIPLE VITAMINS W/ MINERALS TAB 1 TABLET: TAB at 08:47

## 2019-05-17 RX ADMIN — LIDOCAINE 2 PATCH: 560 PATCH PERCUTANEOUS; TOPICAL; TRANSDERMAL at 08:46

## 2019-05-17 RX ADMIN — ATORVASTATIN CALCIUM 10 MG: 10 TABLET, FILM COATED ORAL at 20:53

## 2019-05-17 RX ADMIN — AMLODIPINE BESYLATE 2.5 MG: 2.5 TABLET ORAL at 08:49

## 2019-05-17 RX ADMIN — METOPROLOL TARTRATE 25 MG: 25 TABLET, FILM COATED ORAL at 20:53

## 2019-05-17 RX ADMIN — VITAMIN D, TAB 1000IU (100/BT) 1000 UNITS: 25 TAB at 08:47

## 2019-05-17 RX ADMIN — SENNOSIDES AND DOCUSATE SODIUM 2 TABLET: 8.6; 5 TABLET ORAL at 08:47

## 2019-05-17 RX ADMIN — METOPROLOL TARTRATE 25 MG: 25 TABLET, FILM COATED ORAL at 08:48

## 2019-05-17 RX ADMIN — CALCIUM 500 MG: 500 TABLET ORAL at 08:47

## 2019-05-17 NOTE — PLAN OF CARE
Patient is doing much better,son came to visit this shift.Denies pain.Left hip incision is healing well,steri strips intact.Pleasant.

## 2019-05-17 NOTE — PLAN OF CARE
Cancel first session as pt in bathroom then getting meds.    Son in second session; lee ww issued from here; rehab techs notifiied. Pt did better walking with shoes on; SBA / ft, ww. IND bed mobility simulating high home bed. Last day of PT tomorrow

## 2019-05-17 NOTE — PLAN OF CARE
Patient alert and oriented x 4. Continent of bowel and bladder. No complaints of pain and discomfort all shift. Incisions to L hip intact with steri strips. Patient uses call light approprietly. Transfer with assist of 1 staff, uses walker for ambulation. WBAT. No respiratory distress noted. Will continue with current plan of care.

## 2019-05-17 NOTE — PLAN OF CARE
OT patient's son present during Pm oTtX . Patient doing very well, meeting all in patient OT goals will be ready for OT discharge 5/18/19 as anticipated.    negative

## 2019-05-17 NOTE — PLAN OF CARE
Pt VSS. Pain in L leg/hip controlled with cold packs and PRN tylenol x1 this shift. Pt calling appropriately. SBA with walker. BM this shift. Ate 50% of dinner. Plan for pt to discharge Sunday.

## 2019-05-18 PROCEDURE — 97110 THERAPEUTIC EXERCISES: CPT | Mod: GP | Performed by: PHYSICAL THERAPIST

## 2019-05-18 PROCEDURE — 97116 GAIT TRAINING THERAPY: CPT | Mod: GP | Performed by: PHYSICAL THERAPIST

## 2019-05-18 PROCEDURE — 25000132 ZZH RX MED GY IP 250 OP 250 PS 637: Performed by: PHYSICIAN ASSISTANT

## 2019-05-18 PROCEDURE — A9270 NON-COVERED ITEM OR SERVICE: HCPCS | Performed by: PHYSICIAN ASSISTANT

## 2019-05-18 PROCEDURE — A9270 NON-COVERED ITEM OR SERVICE: HCPCS | Performed by: NURSE PRACTITIONER

## 2019-05-18 PROCEDURE — 12000022 ZZH R&B SNF

## 2019-05-18 PROCEDURE — 99316 NF DSCHRG MGMT 30 MIN+: CPT | Performed by: PHYSICIAN ASSISTANT

## 2019-05-18 PROCEDURE — 97535 SELF CARE MNGMENT TRAINING: CPT | Mod: GO | Performed by: OCCUPATIONAL THERAPIST

## 2019-05-18 PROCEDURE — 97535 SELF CARE MNGMENT TRAINING: CPT | Mod: GO

## 2019-05-18 PROCEDURE — 25000132 ZZH RX MED GY IP 250 OP 250 PS 637: Performed by: NURSE PRACTITIONER

## 2019-05-18 RX ORDER — ISOSORBIDE MONONITRATE 30 MG/1
60 TABLET, EXTENDED RELEASE ORAL DAILY
Status: DISCONTINUED | OUTPATIENT
Start: 2019-05-18 | End: 2019-05-19 | Stop reason: HOSPADM

## 2019-05-18 RX ORDER — ENALAPRIL MALEATE 2.5 MG/1
2.5 TABLET ORAL DAILY
COMMUNITY
Start: 2019-05-19

## 2019-05-18 RX ORDER — ENALAPRIL MALEATE 5 MG/1
5 TABLET ORAL DAILY
Status: DISCONTINUED | OUTPATIENT
Start: 2019-05-19 | End: 2019-05-18

## 2019-05-18 RX ORDER — ENALAPRIL MALEATE 2.5 MG/1
2.5 TABLET ORAL DAILY
Status: DISCONTINUED | OUTPATIENT
Start: 2019-05-19 | End: 2019-05-19 | Stop reason: HOSPADM

## 2019-05-18 RX ORDER — ISOSORBIDE MONONITRATE 60 MG/1
60 TABLET, EXTENDED RELEASE ORAL DAILY
COMMUNITY
Start: 2019-05-18

## 2019-05-18 RX ADMIN — VITAMIN D, TAB 1000IU (100/BT) 1000 UNITS: 25 TAB at 08:45

## 2019-05-18 RX ADMIN — LIDOCAINE 2 PATCH: 560 PATCH PERCUTANEOUS; TOPICAL; TRANSDERMAL at 08:43

## 2019-05-18 RX ADMIN — ISOSORBIDE MONONITRATE 60 MG: 30 TABLET, EXTENDED RELEASE ORAL at 11:15

## 2019-05-18 RX ADMIN — CALCIUM 500 MG: 500 TABLET ORAL at 19:16

## 2019-05-18 RX ADMIN — MULTIPLE VITAMINS W/ MINERALS TAB 1 TABLET: TAB at 08:45

## 2019-05-18 RX ADMIN — CLOPIDOGREL BISULFATE 75 MG: 75 TABLET, FILM COATED ORAL at 08:45

## 2019-05-18 RX ADMIN — METOPROLOL TARTRATE 25 MG: 25 TABLET, FILM COATED ORAL at 08:44

## 2019-05-18 RX ADMIN — METOPROLOL TARTRATE 25 MG: 25 TABLET, FILM COATED ORAL at 20:14

## 2019-05-18 RX ADMIN — ASPIRIN 81 MG CHEWABLE TABLET 162 MG: 81 TABLET CHEWABLE at 08:45

## 2019-05-18 RX ADMIN — ATORVASTATIN CALCIUM 10 MG: 10 TABLET, FILM COATED ORAL at 20:14

## 2019-05-18 RX ADMIN — CALCIUM 500 MG: 500 TABLET ORAL at 08:45

## 2019-05-18 RX ADMIN — RANITIDINE 150 MG: 150 TABLET ORAL at 20:14

## 2019-05-18 RX ADMIN — SENNOSIDES AND DOCUSATE SODIUM 2 TABLET: 8.6; 5 TABLET ORAL at 08:45

## 2019-05-18 RX ADMIN — RANITIDINE 150 MG: 150 TABLET ORAL at 08:45

## 2019-05-18 RX ADMIN — ACETAMINOPHEN 1000 MG: 500 TABLET ORAL at 08:47

## 2019-05-18 RX ADMIN — LEVOTHYROXINE SODIUM 50 MCG: 25 TABLET ORAL at 08:45

## 2019-05-18 NOTE — PLAN OF CARE
OTOccupational Therapy Discharge Summary    Reason for therapy discharge:    Discharged to home with home therapy.    Progress towards therapy goal(s). See goals on Care Plan in [facility] electronic health record for goal details.  Goals met    Therapy recommendation(s):    Continued therapy is recommended.  Rationale/Recommendations:  HH OT for IADL and FX mobility training in condo setting. HHA for sponge bath safety and independence training in condo setting.

## 2019-05-18 NOTE — PLAN OF CARE
Physical Therapy Discharge Summary    Reason for therapy discharge:    Discharged to home with home therapy.  All goals and outcomes met, no further needs identified. Home with son 5/19    Progress towards therapy goal(s). See goals on Care Plan in [facility] electronic health record for goal details.  Goals met Issued WW. Pt and son feel comfortable with discharge    Therapy recommendation(s):    Continued therapy is recommended.  Rationale/Recommendations:  home safety eval, progress strength and gait.

## 2019-05-18 NOTE — PLAN OF CARE
Pt alert and oriented x 4. Able to understand and communicate needs. Continent with bowel and bladder throughout shift, up with assist of one to bathroom. Incisions to left hip moderately intact, pt wants strips to fall off naturally. Pt denies pain throughout shift. Will continue to monitor.

## 2019-05-18 NOTE — PLAN OF CARE
OT patient's son present for PM OT tX and discharge. All in patient OT goals met. Home care follow up planned. No AE needs identified. Will have HH OT and HHA 2ww issued by physical therapy.

## 2019-05-18 NOTE — PLAN OF CARE
RN: Pt has no c/o pain or discomfort this shift. A of 1 to BR with walker. Appear to be sleeping/resting between cares. Ciontinue with plan of care.

## 2019-05-18 NOTE — DISCHARGE SUMMARY
Transitional Care Unit  Discharge Summary    Bernadine Farrell MRN# [id number]   Age: 89 year old YOB: 1928     Date of Admission:  5/2/2019  Date of Discharge:  5/19/2019   Admitting Physician:  Sangeetha Wiggins MD  Discharging Provider:  Tanisha Finley PA-C  Primary Care Provider:             Dewayne Goyal         Outpatient To Do:   Follow up with PCP as scheduled on 5/21/19  Follow up with Dr. Das in Orthopedic clinic 6/11/19  Follow up with Cardiologist Dr. Key of [facility] 6/2019          Reason for Admission:   Bernadine Farrell is a 89 year old female w/ a hx of HTN. CAD s/p stenting, aortic stenosis s/p TAVR (2017), paroxysmal Afib, and hypothyroidism who was admitted to [facility] on 4/27 w/ L intertrochanteric fracture after fall at home who is now s/p IMN to L femur by Dr. Das w/ post-op course c/b acute blood loss anemia and UTI, transferred to TCU on 5/2 for aggressive rehabilitation.          Discharge Diagnoses:   L intertrochanteric fracture s/p IMN of L femur (4/28/19)  Hx of CAD s/p stenting  Hx of Aortic Stenosis s/p TAVR (2017)  Hx of Paroxysmal Afib  HTN  Hypothyroidism  GERD             Consultations:   PT/OT         Rehab Course:   # S/p IMN left femur 2/2 left intertrochanteric fracture, 4/28: Pt presented w/ left hip pain after tripping and falling over her shoes. She was admitted to Trauma Service and underwent IMN w/ Dr. Das on 4/28/19.  Operative course unremarkable. Pain controlled and incision healing well.   - Follow up in Ortho clinic as scheduled on 5/15.     Hx of CAD s/p stenting   Hx of aortic stenosis s/p TAVR (2017)  Hx paroxysmal Afib   HTN  Follows w/ Cardiology at [facility] ([facility]), last seen in clinic on 1/25/19.  PTA on Lipitor, Vasotec, Metoprolol, and Imdur; previously on Amlodipine but appears this was discontinued at last clinic visit.  Echocardiogram performed on 4/29/19 (presumably as part of preop workup given hx of valvular  disease), EF 55-60%, no obvious WMAs, good TAVR function. Post-op started on metoprolol and amlodipine, but Vasotec and Imdur held. Pt resumed on home regimen prior to discharge.Continued DAPT on discharge.   - Follow up with Dr. Key (Cardiology) in June; per clinic note, pt is supposed to wear 48 hr Holter monitor prior to this appointment  - Follow up with PCP on 5/21/19     Hypothyroidism. Most recent TSH WNL. Continude PTA Synthroid 50 mcg daily     GERD.  Sxs stable.      Resolved hospital issues:   Acute blood loss anemia. Hgb drift from 12.0 to 7.7 after surgery.  Improved w/ 2 units PRBCs.  Currently Hgb stable at 10.7 on 5/9.  Monitor via CBC qM.  UTI. UC on 4/28 w/ 10-50K Proteus mirabilis w/ resistance to Macrobid. Finished course of Cipro. Currently asymptomatic.           Physical Exam:   Blood pressure 160/55, pulse 83, temperature 96.5  F (35.8  C), temperature source Oral, resp. rate 18, weight 49.3 kg (108 lb 9.6 oz), SpO2 98 %.    Constitutional: healthy, alert and no distress   Cardiovascular: negative, PMI normal. No lifts, heaves, or thrills. RRR. No murmurs, clicks gallops or rub  Respiratory: Lungs CTAB, no wheezing or crackles  Psychiatric: mentation appears normal and affect normal/bright  Head: Normocephalic. No masses, lesions, tenderness or abnormalities  Abdomen: Abdomen soft, non-tender. BS normal. No masses, organomegaly  NEURO: Gait normal. Reflexes normal and symmetric. Sensation grossly WNL.  SKIN: no suspicious lesions or rashes  JOINT/EXTREMITIES: extremities normal- no gross deformities noted, gait normal and normal muscle tone          Discharge Medications:     Current Discharge Medication List      START taking these medications    Details   enalapril (VASOTEC) 2.5 MG tablet Take 1 tablet (2.5 mg) by mouth daily      isosorbide mononitrate (IMDUR) 60 MG 24 hr tablet Take 1 tablet (60 mg) by mouth daily         CONTINUE these medications which have NOT CHANGED    Details    acetaminophen (TYLENOL) 500 MG tablet Take 2 tablets (1,000 mg) by mouth every 8 hours as needed for mild pain or fever    Associated Diagnoses: Closed intertrochanteric fracture of hip, left, initial encounter (H)      aspirin (ASA) 81 MG chewable tablet Take 162 mg by mouth daily       atorvastatin (LIPITOR) 10 MG tablet Take 10 mg by mouth daily       calcium carbonate 500 mg, elemental, (OSCAL;OYSTER SHELL CALCIUM) 500 MG tablet Take 1 tablet (500 mg) by mouth 2 times daily (with meals)    Associated Diagnoses: Closed intertrochanteric fracture of hip, left, initial encounter (H)      clopidogrel (PLAVIX) 75 MG tablet Take 75 mg by mouth daily       levothyroxine (SYNTHROID/LEVOTHROID) 50 MCG tablet Take 50 mcg by mouth daily       metoprolol tartrate (LOPRESSOR) 50 MG tablet Take 25 mg by mouth 2 times daily       multivitamin w/minerals (THERA-VIT-M) tablet Take 1 tablet by mouth daily    Associated Diagnoses: Closed intertrochanteric fracture of hip, left, initial encounter (H)      polyethylene glycol (MIRALAX/GLYCOLAX) packet Take 17 g by mouth daily as needed for constipation    Associated Diagnoses: Closed intertrochanteric fracture of hip, left, initial encounter (H)      senna-docusate (SENOKOT-S/PERICOLACE) 8.6-50 MG tablet Take 1-2 tablets by mouth 2 times daily    Associated Diagnoses: Closed intertrochanteric fracture of hip, left, initial encounter (H)      vitamin D3 1000 units TABS Take 1,000 Units by mouth daily    Associated Diagnoses: Closed intertrochanteric fracture of hip, left, initial encounter (H)         STOP taking these medications       amLODIPine (NORVASC) 2.5 MG tablet Comments:   Reason for Stopping:         ciprofloxacin (CIPRO) 250 MG tablet Comments:   Reason for Stopping:         Lidocaine (LIDOCARE) 4 % Patch Comments:   Reason for Stopping:         oxyCODONE (ROXICODONE) 5 MG tablet Comments:   Reason for Stopping:                    Discharge Disposition:   Discharged to  home      Code status on discharge: Full Code          Discharge Instructions:     Discharge Procedure Orders   Home care nursing referral   Referral Priority: Routine Referral Type: Home Health Therapies & Aides   Number of Visits Requested: 1     Home Care PT Referral for Hospital Discharge   Referral Priority: Routine Referral Type: Home Health Therapies & Aides   Number of Visits Requested: 1     Home Care OT Referral for Hospital Discharge   Referral Priority: Routine   Number of Visits Requested: 1     Reason for your hospital stay   Order Comments: You were hospitalized for rehabilitation after an orthopedic procedure     Adult [facility] Follow-up and recommended labs and tests   Order Comments: Follow up with primary care provider, Dewayne Goyal, within 7 days for hospital follow- up.  No follow up labs or test are needed. Appointment scheduled for 5/21/19. Follow BP.    Follow up with Dr. Das in Orthopedic clinic on 6/11/19 as scheduled.    Follow up with your Cardiologist Dr. Key at [facility] in June as discussed at last clinic appointment.     Appointments on [place] and/or [facility] (with [facility] or [facility] provider or service). Call [phone] if you haven't heard regarding these appointments within 7 days of discharge.     Activity   Order Comments: Your activity upon discharge: activity as tolerated     Order Specific Question Answer Comments   Is discharge order? Yes      When to contact your care team   Order Comments: Call your primary doctor if you have any of the following: temperature greater than 102 or less than 95, chest pain, shortness of breath, difficulty breathing, lightheadedness, dizziness, or worsening leg pain.     MD face to face encounter   Order Comments: Documentation of Face to Face and Certification for Home Health Services    I certify that patient: Bernadine Farrell is under my care and that I, or a nurse practitioner or physician's assistant  working with me, had a face-to-face encounter that meets the physician face-to-face encounter requirements with this patient on: 5/18/2019.    This encounter with the patient was in whole, or in part, for the following medical condition, which is the primary reason for home health care: rehabilitation s/p orthopedic surgery.    I certify that, based on my findings, the following services are medically necessary home health services: Nursing.    My clinical findings support the need for the above services because: Nurse is needed: lab draws, medication management, OT: iADLs, PT: ADLs and home safety.    Further, I certify that my clinical findings support that this patient is homebound (i.e. absences from home require considerable and taxing effort and are for medical reasons or [employer] services or infrequently or of short duration when for other reasons) because: Requires assistance of another person or specialized equipment to access medical services because patient: Is unable to exit home safely on own due to: orthopedic surgery, deconditioning.    Based on the above findings. I certify that this patient is confined to the home and needs intermittent skilled nursing care, physical therapy and/or speech therapy.  The patient is under my care, and I have initiated the establishment of the plan of care.  This patient will be followed by a physician who will periodically review the plan of care.  Physician/Provider to provide follow up care: Dewayne Goyal    [facility] physician (the Medicare certified [employer] provider):Sangeetha Wiggins MD  Physician Signature: See electronic signature associated with these discharge orders.  Date: 5/18/2019     Full Code     Order Specific Question Answer Comments   Code status determined by: Unable to discuss and no AD/POLST on file; continue PREVIOUSLY ORDERED code status      Diet   Order Comments: Follow this diet upon discharge: Orders Placed This Encounter      Regular  Diet Adult     Order Specific Question Answer Comments   Is discharge order? Yes         Patient seen and examined on 5/18/2019 in anticipation of discharge on 5/19/19. Time spent with patient and in coordination of discharge plan was >30 min. Discharge summary forwarded to PCP, Dewayne Goyal.    Tanisha Finley PA-C  Hospitalist Service  Pager: [phone]

## 2019-05-18 NOTE — PLAN OF CARE
Patient will discharge tomorrow,Tylenol given x 1 for left hip pain.New blood pressure medication ordered and given,systolic runs high.Denies SOB.SBA with a walker to bathroom.Pt uses call light appropriately.Bed alarm.

## 2019-05-19 VITALS
RESPIRATION RATE: 18 BRPM | DIASTOLIC BLOOD PRESSURE: 47 MMHG | WEIGHT: 108.6 LBS | BODY MASS INDEX: 19.86 KG/M2 | HEART RATE: 94 BPM | OXYGEN SATURATION: 98 % | TEMPERATURE: 97.4 F | SYSTOLIC BLOOD PRESSURE: 148 MMHG

## 2019-05-19 PROCEDURE — 00220000 ZZH SNF RUG CODE OPNP

## 2019-05-19 PROCEDURE — 25000132 ZZH RX MED GY IP 250 OP 250 PS 637: Performed by: NURSE PRACTITIONER

## 2019-05-19 PROCEDURE — A9270 NON-COVERED ITEM OR SERVICE: HCPCS | Performed by: PHYSICIAN ASSISTANT

## 2019-05-19 PROCEDURE — A9270 NON-COVERED ITEM OR SERVICE: HCPCS | Performed by: NURSE PRACTITIONER

## 2019-05-19 PROCEDURE — 25000132 ZZH RX MED GY IP 250 OP 250 PS 637: Performed by: PHYSICIAN ASSISTANT

## 2019-05-19 RX ADMIN — RANITIDINE 150 MG: 150 TABLET ORAL at 08:58

## 2019-05-19 RX ADMIN — ACETAMINOPHEN 1000 MG: 500 TABLET ORAL at 08:55

## 2019-05-19 RX ADMIN — CALCIUM 500 MG: 500 TABLET ORAL at 08:58

## 2019-05-19 RX ADMIN — CLOPIDOGREL BISULFATE 75 MG: 75 TABLET, FILM COATED ORAL at 08:57

## 2019-05-19 RX ADMIN — LIDOCAINE 2 PATCH: 560 PATCH PERCUTANEOUS; TOPICAL; TRANSDERMAL at 08:55

## 2019-05-19 RX ADMIN — MULTIPLE VITAMINS W/ MINERALS TAB 1 TABLET: TAB at 08:57

## 2019-05-19 RX ADMIN — VITAMIN D, TAB 1000IU (100/BT) 1000 UNITS: 25 TAB at 08:56

## 2019-05-19 RX ADMIN — ISOSORBIDE MONONITRATE 60 MG: 30 TABLET, EXTENDED RELEASE ORAL at 08:57

## 2019-05-19 RX ADMIN — METOPROLOL TARTRATE 25 MG: 25 TABLET, FILM COATED ORAL at 08:56

## 2019-05-19 RX ADMIN — ASPIRIN 81 MG CHEWABLE TABLET 162 MG: 81 TABLET CHEWABLE at 08:57

## 2019-05-19 RX ADMIN — ENALAPRIL MALEATE 2.5 MG: 2.5 TABLET ORAL at 08:55

## 2019-05-19 RX ADMIN — SENNOSIDES AND DOCUSATE SODIUM 2 TABLET: 8.6; 5 TABLET ORAL at 08:57

## 2019-05-19 RX ADMIN — LEVOTHYROXINE SODIUM 50 MCG: 25 TABLET ORAL at 08:57

## 2019-05-19 NOTE — PLAN OF CARE
Patient alert and oriented x 4,discharge papers given to patient,read and signed chart copy.Patient wheeled down by NA in company of her son who will drive her home.Patient did not carry any medications from here.Pj.

## 2019-05-19 NOTE — PLAN OF CARE
Patient A&Ox4. VSS. /50 and 112/48. Scheduled BP meds given as ordered. Steri-strips to incisions on L hip and thigh CDI. Patient ambulated to bathroom with SBA and walker. Continent of bowel and bladder. Large BM this shift. Patient pleasant and cooperative with cares. Able to make needs known. Verbalized readiness in discharging tomorrow. Continue with POC.

## 2019-05-19 NOTE — PLAN OF CARE
RN: Pt has no c/o pain or discomfort this shift. A of 1 to BR with walker.For discharge today. Appear to be sleeping/resting between cares. Continue with plan of care.

## 2019-06-05 DIAGNOSIS — M79.605 LEFT LEG PAIN: Primary | ICD-10-CM

## 2019-06-11 ENCOUNTER — ANCILLARY PROCEDURE (OUTPATIENT)
Dept: GENERAL RADIOLOGY | Facility: CLINIC | Age: 84
End: 2019-06-11
Attending: ORTHOPAEDIC SURGERY
Payer: MEDICARE

## 2019-06-11 ENCOUNTER — OFFICE VISIT (OUTPATIENT)
Dept: ORTHOPEDICS | Facility: CLINIC | Age: 84
End: 2019-06-11
Payer: MEDICARE

## 2019-06-11 DIAGNOSIS — M79.605 LEFT LEG PAIN: ICD-10-CM

## 2019-06-11 DIAGNOSIS — M25.552 PAIN OF LEFT HIP JOINT: Primary | ICD-10-CM

## 2019-06-11 LAB — RADIOLOGIST FLAGS: NORMAL

## 2019-06-11 NOTE — NURSING NOTE
Reason For Visit:   Chief Complaint   Patient presents with     RECHECK     Open Reduction Internal Fixation, Fracture, Femur, Using Intramedullary Mohan  DOS: 4/28/19       There were no vitals taken for this visit.    Pain Assessment  Patient Currently in Pain: Hyun Connolly, ATC

## 2019-06-11 NOTE — PROGRESS NOTES
CHIEF COMPLAINT:  Status post left hip fracture with IM nailing performed on 04/20/2019.      HISTORY OF PRESENT ILLNESS:  Ms. Farrell presents in the accompaniment of her son for further followup.  Denies to have any problems or complaints.  Reports to be ambulating and to be getting up and in the bed independently every day.  She also reports to be working with physical therapy and to be doing a fair amount of walking.  Today she presents with a walker.      PHYSICAL EXAMINATION:  On today's visit, she presents with full range of motion of the left hip - there is no pain - as well as presents with full range of motion of the left knee and there is some bruising, no pain either.  There is a well-healed surgical incision per her report.      RADIOGRAPHIC EVALUATION:  Two views of the left femur were obtained today which were significant for showing further consolidation across the fracture site.  Hardware is intact and in place.  Continues having some erosion of the anterior cortex of the femur, which is unchanged when compared to previous x-rays.      ASSESSMENT:  Status post left femur IM nailing for proximal hip fracture.      PLAN:  I discussed with the patient and her son that she is making excellent progress.  She is going to proceed with activity as tolerated.  She will follow up in 2 months from now, and at that time, a new AP and lateral x-ray of the left femur will be obtained.      All questions were answered.  The patient was pleased with the discussion.  The patient will follow up accordingly.      TT 15 minutes, CT 10 minutes.

## 2019-06-11 NOTE — LETTER
6/11/2019       RE: Bernadine Farrell  [address]  [place] [id number]     Dear Colleague,    Thank you for referring your patient, Bernadine Farrell, [facility] Please see a copy of my visit note below.    CHIEF COMPLAINT:  Status post left hip fracture with IM nailing performed on 04/20/2019.      HISTORY OF PRESENT ILLNESS:  Ms. Farrell presents in the accompaniment of her son for further followup.  Denies to have any problems or complaints.  Reports to be ambulating and to be getting up and in the bed independently every day.  She also reports to be working with physical therapy and to be doing a fair amount of walking.  Today she presents with a walker.      PHYSICAL EXAMINATION:  On today's visit, she presents with full range of motion of the left hip - there is no pain - as well as presents with full range of motion of the left knee and there is some bruising, no pain either.  There is a well-healed surgical incision per her report.      RADIOGRAPHIC EVALUATION:  Two views of the left femur were obtained today which were significant for showing further consolidation across the fracture site.  Hardware is intact and in place.  Continues having some erosion of the anterior cortex of the femur, which is unchanged when compared to previous x-rays.      ASSESSMENT:  Status post left femur IM nailing for proximal hip fracture.      PLAN:  I discussed with the patient and her son that she is making excellent progress.  She is going to proceed with activity as tolerated.  She will follow up in 2 months from now, and at that time, a new AP and lateral x-ray of the left femur will be obtained.      All questions were answered.  The patient was pleased with the discussion.  The patient will follow up accordingly.      TT 15 minutes, CT 10 minutes.    Again, thank you for allowing me to participate in the care of  your patient.      Sincerely,    Kofi Das MD

## 2019-06-13 ENCOUNTER — TELEPHONE (OUTPATIENT)
Dept: ORTHOPEDICS | Facility: CLINIC | Age: 84
End: 2019-06-13

## 2019-06-13 NOTE — TELEPHONE ENCOUNTER
[facility] Call Center    Phone Message    May a detailed message be left on voicemail: yes    Reason for Call: Order(s): Home Care Orders: Skilled Nursing:  to reasses patient.    Action Taken: Message routed to:  [facility]

## 2019-08-05 DIAGNOSIS — M79.605 LEFT LEG PAIN: Primary | ICD-10-CM

## 2019-08-13 ENCOUNTER — OFFICE VISIT (OUTPATIENT)
Dept: ORTHOPEDICS | Facility: CLINIC | Age: 84
End: 2019-08-13
Payer: MEDICARE

## 2019-08-13 ENCOUNTER — ANCILLARY PROCEDURE (OUTPATIENT)
Dept: GENERAL RADIOLOGY | Facility: CLINIC | Age: 84
End: 2019-08-13
Attending: ORTHOPAEDIC SURGERY
Payer: MEDICARE

## 2019-08-13 DIAGNOSIS — M79.605 LEFT LEG PAIN: ICD-10-CM

## 2019-08-13 DIAGNOSIS — M25.552 PAIN OF LEFT HIP JOINT: Primary | ICD-10-CM

## 2019-08-13 NOTE — PROGRESS NOTES
Reports to be doing well.  Reports to be walking with a cane for long distances but with no cane around the house. Denies to have any pain on either the left hip or left knee.      PHYSICAL EXAMINATION:  On today's visit, she presents with full range of motion of the knee with full extension and to 110 degrees of flexion plus.  Presents also with full range of motion of the hip with no pain on extreme internal or external rotation.      RADIOGRAPHIC EVALUATION:  2 views of the left femur were obtained today which were significant for showing full consolidation of the fracture site.  Hardware is intact and in place.  Alignment is excellent.  There is no failure of the hardware.      ASSESSMENT:  Status post left femur IM nailing for proximal femur fracture.      PLAN:  I discussed with the patient and her son that she is making excellent progress.  She is going to proceed with activities as tolerated.  She has no restrictions.  They declined interest on the physical therapy.  She will follow up on a p.r.n. basis.      All questions were answered.      TT 15 minutes, CT 10 minutes.

## 2019-08-13 NOTE — LETTER
8/13/2019       RE: Bernadine Farrell  [address]  [place] [id number]     Dear Colleague,    Thank you for referring your patient, Bernadine Farrell, to the [facility] at [facility]. Please see a copy of my visit note below.    Reports to be doing well.  Reports to be walking with a cane for long distances but with no cane around the house. Denies to have any pain on either the left hip or left knee.      PHYSICAL EXAMINATION:  On today's visit, she presents with full range of motion of the knee with full extension and to 110 degrees of flexion plus.  Presents also with full range of motion of the hip with no pain on extreme internal or external rotation.      RADIOGRAPHIC EVALUATION:  2 views of the left femur were obtained today which were significant for showing full consolidation of the fracture site.  Hardware is intact and in place.  Alignment is excellent.  There is no failure of the hardware.      ASSESSMENT:  Status post left femur IM nailing for proximal femur fracture.      PLAN:  I discussed with the patient and her son that she is making excellent progress.  She is going to proceed with activities as tolerated.  She has no restrictions.  They declined interest on the physical therapy.  She will follow up on a p.r.n. basis.      All questions were answered.      TT 15 minutes, CT 10 minutes.     Again, thank you for allowing me to participate in the care of your patient.      Sincerely,    Kofi Das MD

## (undated) DEVICE — DRAPE SHEET MED 44X70" 9355

## (undated) DEVICE — GLOVE PROTEXIS BLUE W/NEU-THERA 8.0  2D73EB80

## (undated) DEVICE — SU ETHILON 3-0 PS-1 18" 1663H

## (undated) DEVICE — DRAPE CONVERTORS U-DRAPE 60X72" 8476

## (undated) DEVICE — DRAPE IOBAN ISOLATION VERTICAL 320X21CM 6617

## (undated) DEVICE — DRSG AQUACEL AG 3.5X9.75" HYDROFIBER 412011

## (undated) DEVICE — LINEN TOWEL PACK X30 5481

## (undated) DEVICE — SOL WATER IRRIG 1000ML BOTTLE 2F7114

## (undated) DEVICE — CLIP TUBE  STRK  GAMMA3  CLOSED 1320-0125S

## (undated) DEVICE — LINEN TOWEL PACK X6 WHITE 5487

## (undated) DEVICE — STPL SKIN 35W ROTATING HEAD PRW35

## (undated) DEVICE — GLOVE PROTEXIS W/NEU-THERA 7.5  2D73TE75

## (undated) DEVICE — DRAPE SHEET REV FOLD 3/4 9349

## (undated) DEVICE — DRSG ADAPTIC 3X16"  6114

## (undated) DEVICE — DRAPE U SPLIT 74X120" 29440

## (undated) DEVICE — IMP WIRE KIRSCHNER STRK 3.0X285MM 1806-0050S
Type: IMPLANTABLE DEVICE | Site: FEMUR | Status: NON-FUNCTIONAL
Removed: 2019-04-28

## (undated) DEVICE — ESU GROUND PAD ADULT W/CORD E7507

## (undated) DEVICE — SU VICRYL 2-0 CT-2 27" J333H

## (undated) DEVICE — DRILL BIT STRK 4.2X103MM FULL THRD 1806-4280S

## (undated) DEVICE — DRAPE STERI U 1015

## (undated) DEVICE — DRAPE C-ARM W/STRAPS 42X72" 07-CA104

## (undated) DEVICE — PREP DURAPREP 26ML APL 8630

## (undated) DEVICE — IMP WIRE KIRSCHNER 3.2X450MM 1210-6450S
Type: IMPLANTABLE DEVICE | Site: FEMUR | Status: NON-FUNCTIONAL
Removed: 2019-04-28

## (undated) DEVICE — GUIDEWIRE BALL TIP 3.0X1000MM 1806-0085S

## (undated) RX ORDER — LIDOCAINE HYDROCHLORIDE 20 MG/ML
INJECTION, SOLUTION EPIDURAL; INFILTRATION; INTRACAUDAL; PERINEURAL
Status: DISPENSED
Start: 2019-04-28

## (undated) RX ORDER — KETAMINE HCL IN 0.9 % NACL 50 MG/5 ML
SYRINGE (ML) INTRAVENOUS
Status: DISPENSED
Start: 2019-04-28

## (undated) RX ORDER — ALBUMIN, HUMAN INJ 5% 5 %
SOLUTION INTRAVENOUS
Status: DISPENSED
Start: 2019-04-28

## (undated) RX ORDER — FENTANYL CITRATE 50 UG/ML
INJECTION, SOLUTION INTRAMUSCULAR; INTRAVENOUS
Status: DISPENSED
Start: 2019-04-28

## (undated) RX ORDER — PHENYLEPHRINE HCL IN 0.9% NACL 1 MG/10 ML
SYRINGE (ML) INTRAVENOUS
Status: DISPENSED
Start: 2019-04-28

## (undated) RX ORDER — EPHEDRINE SULFATE 50 MG/ML
INJECTION, SOLUTION INTRAMUSCULAR; INTRAVENOUS; SUBCUTANEOUS
Status: DISPENSED
Start: 2019-04-28

## (undated) RX ORDER — ETOMIDATE 2 MG/ML
INJECTION INTRAVENOUS
Status: DISPENSED
Start: 2019-04-28

## (undated) RX ORDER — ALBUTEROL SULFATE 90 UG/1
AEROSOL, METERED RESPIRATORY (INHALATION)
Status: DISPENSED
Start: 2019-04-28

## (undated) RX ORDER — PROPOFOL 10 MG/ML
INJECTION, EMULSION INTRAVENOUS
Status: DISPENSED
Start: 2019-04-28

## (undated) RX ORDER — MANNITOL 20 G/100ML
INJECTION, SOLUTION INTRAVENOUS
Status: DISPENSED
Start: 2019-04-28

## (undated) RX ORDER — GLYCOPYRROLATE 0.2 MG/ML
INJECTION, SOLUTION INTRAMUSCULAR; INTRAVENOUS
Status: DISPENSED
Start: 2019-04-28

## (undated) RX ORDER — ONDANSETRON 2 MG/ML
INJECTION INTRAMUSCULAR; INTRAVENOUS
Status: DISPENSED
Start: 2019-04-28

## (undated) RX ORDER — LABETALOL 20 MG/4 ML (5 MG/ML) INTRAVENOUS SYRINGE
Status: DISPENSED
Start: 2019-04-28